# Patient Record
Sex: FEMALE | Race: WHITE | NOT HISPANIC OR LATINO | Employment: PART TIME | URBAN - METROPOLITAN AREA
[De-identification: names, ages, dates, MRNs, and addresses within clinical notes are randomized per-mention and may not be internally consistent; named-entity substitution may affect disease eponyms.]

---

## 2017-01-02 ENCOUNTER — MYC MEDICAL ADVICE (OUTPATIENT)
Dept: INTERNAL MEDICINE | Facility: CLINIC | Age: 57
End: 2017-01-02

## 2017-01-02 DIAGNOSIS — F41.9 ANXIETY: Primary | ICD-10-CM

## 2017-01-02 RX ORDER — DIAZEPAM 10 MG
10 TABLET ORAL EVERY 12 HOURS PRN
Qty: 30 TABLET | Refills: 0 | Status: CANCELLED | OUTPATIENT
Start: 2017-01-02

## 2017-01-02 NOTE — TELEPHONE ENCOUNTER
RN sees Rx faxed to [facility] on [address], not [address].    Will need new script faxed to pended [facility].    Christine Barron RN  [facility]

## 2017-01-04 ENCOUNTER — TELEPHONE (OUTPATIENT)
Dept: INTERNAL MEDICINE | Facility: CLINIC | Age: 57
End: 2017-01-04

## 2017-01-04 ENCOUNTER — MYC MEDICAL ADVICE (OUTPATIENT)
Dept: INTERNAL MEDICINE | Facility: CLINIC | Age: 57
End: 2017-01-04

## 2017-01-04 DIAGNOSIS — F41.9 ANXIETY: Primary | ICD-10-CM

## 2017-01-04 NOTE — TELEPHONE ENCOUNTER
diazepam (VALIUM) 10 MG tablet      Last Written Prescription Date:  12/26/16  Last Fill Quantity: 30,   # refills: 0  Last Office Visit with [facility] prescribing provider: 12/26/16  Future Office visit:    Next 5 appointments (look out 90 days)     Jan 19, 2017  5:20 PM   [facility]    [address] [place] [address] [id number] [phone]                   Routing refill request to provider for review/approval because:  Drug not on the [facility] refill protocol or controlled substance

## 2017-01-04 NOTE — TELEPHONE ENCOUNTER
Routed to  to address, I think the patient already came in for this.  See previous MyChart communication regarding Rx faxed (valium).  Rosmery Marquez RN  [facility]

## 2017-01-11 ENCOUNTER — MYC MEDICAL ADVICE (OUTPATIENT)
Dept: INTERNAL MEDICINE | Facility: CLINIC | Age: 57
End: 2017-01-11

## 2017-01-12 ENCOUNTER — MYC MEDICAL ADVICE (OUTPATIENT)
Dept: INTERNAL MEDICINE | Facility: CLINIC | Age: 57
End: 2017-01-12

## 2017-01-12 DIAGNOSIS — F90.9 ADULT ADHD: Primary | ICD-10-CM

## 2017-01-12 RX ORDER — DEXTROAMPHETAMINE SACCHARATE, AMPHETAMINE ASPARTATE MONOHYDRATE, DEXTROAMPHETAMINE SULFATE AND AMPHETAMINE SULFATE 2.5; 2.5; 2.5; 2.5 MG/1; MG/1; MG/1; MG/1
10 CAPSULE, EXTENDED RELEASE ORAL DAILY
Qty: 30 CAPSULE | Refills: 0 | Status: SHIPPED | OUTPATIENT
Start: 2017-01-12 | End: 2017-03-06

## 2017-01-12 RX ORDER — DIAZEPAM 10 MG
10 TABLET ORAL EVERY 12 HOURS PRN
Qty: 30 TABLET | Refills: 0 | Status: SHIPPED | OUTPATIENT
Start: 2017-01-12 | End: 2017-06-07

## 2017-01-12 RX ORDER — DEXTROAMPHETAMINE SACCHARATE, AMPHETAMINE ASPARTATE MONOHYDRATE, DEXTROAMPHETAMINE SULFATE AND AMPHETAMINE SULFATE 1.25; 1.25; 1.25; 1.25 MG/1; MG/1; MG/1; MG/1
5 CAPSULE, EXTENDED RELEASE ORAL DAILY
Qty: 30 CAPSULE | Refills: 0 | Status: SHIPPED | OUTPATIENT
Start: 2017-01-12 | End: 2017-01-19 | Stop reason: DRUGHIGH

## 2017-01-12 NOTE — TELEPHONE ENCOUNTER
Routing refill request to provider for review/approval because:  Drug not on the [facility] refill protocol   Please see message below.  Carmen Webb RN [facility] Triage.    Carmen Webb RN [facility] Triage.

## 2017-01-19 ENCOUNTER — OFFICE VISIT (OUTPATIENT)
Dept: INTERNAL MEDICINE | Facility: CLINIC | Age: 57
End: 2017-01-19
Payer: COMMERCIAL

## 2017-01-19 VITALS
OXYGEN SATURATION: 97 % | HEART RATE: 66 BPM | DIASTOLIC BLOOD PRESSURE: 81 MMHG | WEIGHT: 151 LBS | HEIGHT: 65 IN | BODY MASS INDEX: 25.16 KG/M2 | SYSTOLIC BLOOD PRESSURE: 131 MMHG | TEMPERATURE: 97.7 F

## 2017-01-19 DIAGNOSIS — J44.9 COPD, MODERATE (H): Primary | Chronic | ICD-10-CM

## 2017-01-19 DIAGNOSIS — F80.0 IMPAIRED SPEECH ARTICULATION: ICD-10-CM

## 2017-01-19 PROCEDURE — 99214 OFFICE O/P EST MOD 30 MIN: CPT | Performed by: INTERNAL MEDICINE

## 2017-01-19 NOTE — NURSING NOTE
"Chief Complaint   Patient presents with     RECHECK     breathing      Health Maintenance     Flu Shot       Initial /81 mmHg  Pulse 66  Temp(Src) 97.7  F (36.5  C) (Oral)  Ht 5' 4.5\" (1.638 m)  Wt 151 lb (68.493 kg)  BMI 25.53 kg/m2  SpO2 97% Estimated body mass index is 25.53 kg/(m^2) as calculated from the following:    Height as of this encounter: 5' 4.5\" (1.638 m).    Weight as of this encounter: 151 lb (68.493 kg).  BP completed using cuff size: regular  Lisa Perdue ma      "

## 2017-01-19 NOTE — MR AVS SNAPSHOT
After Visit Summary   1/19/2017    Georgette Pereira    MRN: [id number]           Patient Information     Date Of Birth          1960        Visit Information        Provider Department      1/19/2017 5:20 PM Yue Bermudez MD [facility]        Today's Diagnoses     COPD, moderate (H)    -  1     Impaired speech articulation           Care Instructions    Speech therapy:  Call ,  [phone]  For eval and treat    Lung Power Referral at [facility]   [phone]    Reduce adderall to 10 mg daily.  Okay to skip days when not working.     Return to clinic 3 months or sooner if needed.     Follow up with Dr Martinez        Follow-ups after your visit        Additional Services     PULMONARY REHAB REFERRAL       [facility] lung power            SPEECH THERAPY REFERRAL       *This therapy referral will be filtered to a centralized scheduling office at [facility] and the patient will receive a call to schedule an appointment at a [place] location most convenient for them. *     [facility] provides Speech Therapy evaluation and treatment and many specialty services across the [place] system.  If requesting a specialty program, please choose from the list below.  If you have not heard from the scheduling office within 2 business days, please call [phone] for all locations, with the exception of [place], please call [phone].       Treatment: Evaluation & Treatment  Speech Treatment Diagnosis: impaired articulation  Special Instructions: patient feels her speech has changed and hard to articulate self.  Wants therapy for this.    Special Programs: Voice   And per speech therapist.     Please be aware that coverage of these services is subject to the terms and limitations of your health insurance plan.  Call member services at your health plan with any benefit or coverage questions.      **Note to Provider:  If you are  "referring outside of [place] for the therapy appointment, please list the name of the location in the  special instructions  above, print the referral and give to the patient to schedule the appointment.                  Future tests that were ordered for you today     Open Future Orders        Priority Expected Expires Ordered    PULMONARY REHAB REFERRAL Routine  1/19/2018 1/19/2017            Who to contact     If you have questions or need follow up information about today's clinic visit or your schedule please contact [facility] directly at [phone].  Normal or non-critical lab and imaging results will be communicated to you by [employer], letter or phone within 4 business days after the clinic has received the results. If you do not hear from us within 7 days, please contact the clinic through [employer] or phone. If you have a critical or abnormal lab result, we will notify you by phone as soon as possible.  Submit refill requests through [employer] or call your pharmacy and they will forward the refill request to us. Please allow 3 business days for your refill to be completed.          Additional Information About Your Visit        [employer] Information     [employer] gives you secure access to your electronic health record. If you see a primary care provider, you can also send messages to your care team and make appointments. If you have questions, please call your primary care clinic.  If you do not have a primary care provider, please call [phone] and they will assist you.        Care EveryWhere ID     This is your Care EveryWhere ID. This could be used by other organizations to access your [place] medical records  [id number]        Your Vitals Were     Pulse Temperature Height BMI (Body Mass Index) Pulse Oximetry       66 97.7  F (36.5  C) (Oral) 5' 4.5\" (1.638 m) 25.53 kg/m2 97%        Blood Pressure from Last 3 Encounters:   01/19/17 131/81   12/26/16 139/93   12/08/16 154/104    " Weight from Last 3 Encounters:   01/19/17 151 lb (68.493 kg)   12/26/16 151 lb (68.493 kg)   12/08/16 150 lb (68.04 kg)              We Performed the Following     SPEECH THERAPY REFERRAL          Today's Medication Changes          These changes are accurate as of: 1/19/17  6:12 PM.  If you have any questions, ask your nurse or doctor.               These medicines have changed or have updated prescriptions.        Dose/Directions    amphetamine-dextroamphetamine 10 MG per 24 hr capsule   Commonly known as:  ADDERALL XR   This may have changed:  Another medication with the same name was removed. Continue taking this medication, and follow the directions you see here.   Used for:  Adult ADHD   Changed by:  Yue Bermudez MD        Dose:  10 mg   Take 1 capsule (10 mg) by mouth daily (with 5 mg for a total of 15 mg daily)    May fill on or after 1/12/17   Quantity:  30 capsule   Refills:  0                Primary Care Provider Office Phone # Fax #    Yue Bermudez [phone]       [facility] [address]  [facility] [address]        Thank you!     Thank you for choosing [facility]  for your care. Our goal is always to provide you with excellent care. Hearing back from our patients is one way we can continue to improve our services. Please take a few minutes to complete the written survey that you may receive in the mail after your visit with us. Thank you!             Your Updated Medication List - Protect others around you: Learn how to safely use, store and throw away your medicines at www.disposemymeds.org.          This list is accurate as of: 1/19/17  6:13 PM.  Always use your most recent med list.                   Brand Name Dispense Instructions for use    acetaminophen-codeine 300-30 MG per tablet    TYLENOL/codeine #3    120 tablet    Take 1-2 tablets by mouth every 4 hours as needed for pain       * albuterol 108 (90 BASE)  MCG/ACT Inhaler    albuterol    1 Inhaler    Inhale 2 puffs into the lungs every 6 hours as needed for shortness of breath / dyspnea or wheezing       * albuterol (2.5 MG/3ML) 0.083% neb solution     60 vial    Take 1 vial (2.5 mg) by nebulization every 6 hours as needed for shortness of breath / dyspnea or wheezing       amitriptyline 50 MG tablet    ELAVIL    90 tablet    Take 1 tablet (50 mg) by mouth At Bedtime       amphetamine-dextroamphetamine 10 MG per 24 hr capsule    ADDERALL XR    30 capsule    Take 1 capsule (10 mg) by mouth daily (with 5 mg for a total of 15 mg daily)    May fill on or after 1/12/17       atenolol 50 MG tablet    TENORMIN    90 tablet    Take 2 tablets (100 mg) by mouth daily       atorvastatin 40 MG tablet    LIPITOR    90 tablet    Take 1 tablet (40 mg) by mouth daily       diazepam 10 MG tablet    VALIUM    30 tablet    Take 1 tablet (10 mg) by mouth every 12 hours as needed for anxiety or sleep (limit one per day)       DULoxetine 60 MG EC capsule    CYMBALTA    90 capsule    Take 1 capsule (60 mg) by mouth daily       estradiol 0.1 MG/GM cream    ESTRACE VAGINAL    40 g    Place 2 g vaginally twice a week. Okay to substitute with preferred equivalent       ibuprofen 200 MG tablet    ADVIL/MOTRIN    100 tablet    Take 4 tablets by mouth every 4 hours as needed for pain.       ketoconazole 2 % cream    NIZORAL    30 g    Apply to the itching rash sparingly bid       lidocaine 2 % topical gel    XYLOCAINE    30 mL    Apply topically 2 times daily as needed for moderate pain       losartan 25 MG tablet    COZAAR    90 tablet    Take 1 tablet (25 mg) by mouth daily       mometasone-formoterol 200-5 MCG/ACT oral inhaler    DULERA    3 Inhaler    Inhale 2 puffs into the lungs 2 times daily       Sodium Fluoride 1.1 % Crea    SF 5000 PLUS    57 g    Use for oral hygiene twice daily       tiotropium 18 MCG capsule    SPIRIVA    90 capsule    Inhale 1 capsule (18 mcg) into the lungs daily        triamcinolone 0.1 % cream    KENALOG    30 g    Apply sparingly to affected area. As needed for itching       * Notice:  This list has 2 medication(s) that are the same as other medications prescribed for you. Read the directions carefully, and ask your doctor or other care provider to review them with you.

## 2017-01-19 NOTE — PROGRESS NOTES
SUBJECTIVE:                                                    Georgette Pereira is a 56 year old female who presents to clinic today for the following health issues:      COPD Follow-Up    Symptoms are currently: stable    Current fatigue or dyspnea with ambulation: same    Shortness of breath: same    Increased or change in Cough/Sputum: No    Fever(s): No    Baseline ambulation without stopping to rest  . Able to walk up  flights of stairs without stopping to rest.    Any ER/UC or hospital admissions since your last visit? No     History   Smoking status     Former Smoker -- 1.00 packs/day for 32 years     Types: Cigarettes     Quit date: 12/31/2012   Smokeless tobacco     Never Used     Comment: using nicotine replacement: e cigarattes     No results found for this basename: FEV1, [id number]       Amount of exercise or physical activity: None    Problems taking medications regularly: No    Medication side effects: none    Diet: regular (no restrictions)         Problem list and histories reviewed & adjusted, as indicated.  Additional history: as documented    Patient Active Problem List   Diagnosis     Lung nodule     Anxiety     Fibromyalgia     Knee pain     History of pneumonia, recurrent     Internal hemorrhoids with other complication     COPD, moderate (H)     Adult ADHD     Family history of early CAD     Hyperlipidemia LDL goal <130     Essential hypertension with goal blood pressure less than 140/90     Primary osteoarthritis involving multiple joints     Advanced directives, counseling/discussion     Past Surgical History   Procedure Laterality Date     Hysterectomy vaginal       C tmj arthroscopy/surgery  2000       Social History   Substance Use Topics     Smoking status: Former Smoker -- 1.00 packs/day for 32 years     Types: Cigarettes     Quit date: 12/31/2012     Smokeless tobacco: Never Used      Comment: using nicotine replacement: e cigarattes     Alcohol Use: No     Family History   Problem  Relation Age of Onset     Breast Cancer Maternal Grandmother      CEREBROVASCULAR DISEASE Mother      Hypertension Mother      HEART DISEASE Mother      CANCER Mother      Lung     Substance Abuse Mother      CEREBROVASCULAR DISEASE Father      Hypertension Father      CANCER Father      Metastatic, primary lung/Prostate     Substance Abuse Father      Aneurysm Paternal Uncle      Brain Aneurysms     CANCER Other      Substance Abuse Brother      Depression Son      Schizophrenia Brother      Bipolar Disorder Brother      Substance Abuse Brother          Current Outpatient Prescriptions   Medication Sig Dispense Refill     diazepam (VALIUM) 10 MG tablet Take 1 tablet (10 mg) by mouth every 12 hours as needed for anxiety or sleep (limit one per day) 30 tablet 0     amphetamine-dextroamphetamine (ADDERALL XR) 10 MG per 24 hr capsule Take 1 capsule (10 mg) by mouth daily (with 5 mg for a total of 15 mg daily)    May fill on or after 1/12/17 30 capsule 0     mometasone-formoterol (DULERA) 200-5 MCG/ACT oral inhaler Inhale 2 puffs into the lungs 2 times daily 3 Inhaler 3     tiotropium (SPIRIVA) 18 MCG capsule Inhale 1 capsule (18 mcg) into the lungs daily 90 capsule 3     atenolol (TENORMIN) 50 MG tablet Take 2 tablets (100 mg) by mouth daily 90 tablet 3     acetaminophen-codeine (TYLENOL/CODEINE #3) 300-30 MG per tablet Take 1-2 tablets by mouth every 4 hours as needed for pain 120 tablet 0     amitriptyline (ELAVIL) 50 MG tablet Take 1 tablet (50 mg) by mouth At Bedtime 90 tablet 0     Sodium Fluoride (SF 5000 PLUS) 1.1 % CREA Use for oral hygiene twice daily 57 g 12     atorvastatin (LIPITOR) 40 MG tablet Take 1 tablet (40 mg) by mouth daily 90 tablet 3     losartan (COZAAR) 25 MG tablet Take 1 tablet (25 mg) by mouth daily 90 tablet 3     lidocaine (XYLOCAINE) 2 % jelly Apply topically 2 times daily as needed for moderate pain 30 mL 3     DULoxetine (CYMBALTA) 60 MG capsule Take 1 capsule (60 mg) by mouth daily 90  "capsule 2     albuterol (2.5 MG/3ML) 0.083% nebulizer solution Take 1 vial (2.5 mg) by nebulization every 6 hours as needed for shortness of breath / dyspnea or wheezing 60 vial 6     albuterol (ALBUTEROL) 108 (90 BASE) MCG/ACT inhaler Inhale 2 puffs into the lungs every 6 hours as needed for shortness of breath / dyspnea or wheezing 1 Inhaler 5     triamcinolone (KENALOG) 0.1 % cream Apply sparingly to affected area. As needed for itching 30 g 0     ketoconazole (NIZORAL) 2 % cream Apply to the itching rash sparingly bid 30 g 1     estradiol (ESTRACE VAGINAL) 0.1 MG/GM vaginal cream Place 2 g vaginally twice a week. Okay to substitute with preferred equivalent 40 g 3     ibuprofen (ADVIL,MOTRIN) 200 MG tablet Take 4 tablets by mouth every 4 hours as needed for pain. 100 tablet 3     Allergies   Allergen Reactions     Buspirone Nausea     Lisinopril Cough     Vicodin [Hydrocodone-Acetaminophen] Nausea and Vomiting     Recent Labs   Lab Test  08/12/16   0851  03/02/16   1217  08/13/15   1034   LDL  55  51  197*   HDL  50  56  62   TRIG  202*  198*  278*   ALT   --   19   --    CR  0.89  0.91   --    GFRESTIMATED  66  64   --    GFRESTBLACK  80  77   --    POTASSIUM  3.7  4.6   --    TSH   --   0.58  1.84      BP Readings from Last 3 Encounters:   01/19/17 131/81   12/26/16 139/93   12/08/16 154/104    Wt Readings from Last 3 Encounters:   01/19/17 151 lb (68.493 kg)   12/26/16 151 lb (68.493 kg)   12/08/16 150 lb (68.04 kg)                  Labs reviewed in EPIC  Problem list, Medication list, Allergies, and Medical/Social/Surgical histories reviewed in [facility] and updated as appropriate.    ROS:  Constitutional, HEENT, cardiovascular, pulmonary, gi and gu systems are negative, except as otherwise noted.    OBJECTIVE:                                                    /81 mmHg  Pulse 66  Temp(Src) 97.7  F (36.5  C) (Oral)  Ht 5' 4.5\" (1.638 m)  Wt 151 lb (68.493 kg)  BMI 25.53 kg/m2  SpO2 97%  Body mass " index is 25.53 kg/(m^2).  GENERAL: healthy, alert and no distress  EYES: Eyes grossly normal to inspection, PERRL and conjunctivae and sclerae normal  NECK: no adenopathy, no asymmetry, masses, or scars and thyroid normal to palpation  RESP: lungs clear to auscultation - no rales, rhonchi or wheezes  RESP: diminished BS bilaterally  CV: regular rate and rhythm, normal S1 S2, no S3 or S4, no murmur, click or rub, no peripheral edema and peripheral pulses strong  MS: no gross musculoskeletal defects noted, no edema  NEURO: Normal strength and tone, mentation intact and speech normal  PSYCH: mentation appears normal, affect normal/bright  PSYCH:  Pressured speech.     Diagnostic Test Results:  Results for orders placed or performed in visit on 12/23/16   CT Chest w Contrast    Narrative    CT CHEST WITH CONTRAST   12/23/2016 10:55 AM    HISTORY: Shortness of breath.    TECHNIQUE: 80 mL Isovue-370 were injected intravenously. After  contrast injection, volumetric helical acquisition was performed from  the thoracic inlet through the upper abdomen. Coronal images were also  reconstructed. Radiation dose for this scan was reduced using  automated exposure control, adjustment of the mA and/or kV according  to patient size, or iterative reconstruction technique.    COMPARISON: Chest CT without IV contrast performed 8/23/2013.    FINDINGS: Emphysematous changes are noted in both upper lungs.  Calcified granuloma in the right lower lobe is unchanged. The lungs  are otherwise clear. No pleural or pericardial effusions. Calcified  mediastinal and right hilar lymph nodes are unchanged. No enlarged  lymph nodes are identified in the chest. Mild atherosclerotic  calcification of the thoracic aorta. Limited views of the upper  abdomen are unremarkable.      Impression    IMPRESSION:   1. No acute abnormality in the chest. No definite cause for shortness  of breath is identified.   2. Emphysematous changes are noted in both upper  lungs.   3. Evidence for old granulomatous disease in the right lower lobe of  the lung as well as within mediastinal and right hilar lymph nodes.    JAMAR CANALES MD        ASSESSMENT/PLAN:                                                            ICD-10-CM    1. COPD, moderate (H) J44.9 PULMONARY REHAB REFERRAL   2. Impaired speech articulation F80.0 SPEECH THERAPY REFERRAL       Lung power. Referral in.    Should follow up with pulmonary  Speech therapy for her pressured speech.  Patient request.  Patient agreeable to reducing the Adderall from 15 mg daily to 10 mg daily          RETURN TO CLINIC 3 months    Yue Bermudez MD  [facility]

## 2017-01-20 NOTE — PATIENT INSTRUCTIONS
Speech therapy:  Call ,  [phone]  For eval and treat    Lung Power Referral at [facility]   [phone]    Reduce adderall to 10 mg daily.  Okay to skip days when not working.     Return to clinic 3 months or sooner if needed.     Follow up with Dr Martinez

## 2017-01-30 DIAGNOSIS — M79.7 FIBROMYALGIA: Primary | ICD-10-CM

## 2017-01-30 NOTE — TELEPHONE ENCOUNTER
amitriptyline (ELAVIL) 50 MG tablet      Last Written Prescription Date: 9/23/16  Last Quantity: 90, # refills: 0  Last Office Visit with [facility], [facility] or [facility] prescribing provider: 1/19/17       CREATININE   Date Value Ref Range Status   08/12/2016 0.89 0.52 - 1.04 mg/dL Final     AST       16   3/2/2016  ALT       19   3/2/2016  BP Readings from Last 3 Encounters:   01/19/17 131/81   12/26/16 139/93   12/08/16 154/104

## 2017-01-31 ENCOUNTER — HOSPITAL ENCOUNTER (OUTPATIENT)
Dept: SPEECH THERAPY | Facility: CLINIC | Age: 57
Setting detail: THERAPIES SERIES
End: 2017-01-31
Attending: INTERNAL MEDICINE
Payer: COMMERCIAL

## 2017-01-31 PROCEDURE — 40000211 ZZHC STATISTIC SLP  DEPARTMENT VISIT: Performed by: SPEECH-LANGUAGE PATHOLOGIST

## 2017-01-31 PROCEDURE — 92523 SPEECH SOUND LANG COMPREHEN: CPT | Mod: GN | Performed by: SPEECH-LANGUAGE PATHOLOGIST

## 2017-01-31 RX ORDER — AMITRIPTYLINE HYDROCHLORIDE 50 MG/1
50 TABLET ORAL AT BEDTIME
Qty: 90 TABLET | Refills: 1 | Status: SHIPPED | OUTPATIENT
Start: 2017-01-31 | End: 2017-05-16

## 2017-01-31 NOTE — TELEPHONE ENCOUNTER
Prescription approved per [facility] to the [facility] Refill Protocol.     Shayna Almaraz RN   January 31, 2017 2:27 PM  [facility] Triage   [phone]

## 2017-02-03 ENCOUNTER — MYC MEDICAL ADVICE (OUTPATIENT)
Dept: INTERNAL MEDICINE | Facility: CLINIC | Age: 57
End: 2017-02-03

## 2017-02-06 NOTE — TELEPHONE ENCOUNTER
Routed another [employer] message to patient to clarify her need.  Rosmery Marquez RN  [facility]

## 2017-02-07 NOTE — TELEPHONE ENCOUNTER
Attempted to call patient at home number, left message on answering service requesting call back to clinic to discuss.  Rosmery Marquez RN  [facility]

## 2017-02-15 NOTE — PROGRESS NOTES
"Adult Outpatient Speech and Language Evaluation   01/31/17 1200       Present No   General Information   Type of Evaluation Speech and Language   Type Of Visit Initial   Start Of Care Date 01/31/17   Referring Physician Yue Bermudez MD   Orders Evaluate And Treat   Orders Comment Impaired articulation.   Medical Diagnosis No known associated medical diagnosis at this time.   Onset Of Illness/injury Or Date Of Surgery 01/19/17  (Order date. Pt reports speech has changed over the last year)   Precautions/Limitations no known precautions/limitations   Hearing WFL   Surgical/Medical history reviewed Yes   Pertinent History Of Current Problem Geena presents with reports of changes in her speech. She reports she has always been a fast talker, but over the last year her speech has gotten faster and more difficult to understand. She is able to slow her speech, but this requires significant attention and causes her to lose her thoughts/message. Geena does have diagnosed ADHD and Anxiety. Geena has taken medication for her ADHD, but has been attempting to decrease and quit this medication. When she stops her medication for 3-4 days she sees no change in her speech. Geena is not on any medications for her anxiety and reports she feels it is well managed and is not the cause of her speech changes. Geena's family and friends note that Geena has always talked fast, but have noted changes. Her boyfriend frequently tells her he can't understand her. In addition to her speech difficulty, Geena reports trouble coming up with words and that she \"loses her thoughts\" sometimes.   Patient Role/employment History Employed  (Works as a .)   Living environment Apartment/condo  (With her fiance.)   Patient/family Goals Geena reports she would like to be able to speak easily and clearly and to be understood.   FALL RISK SCREEN   Have you fallen 2 or more times in the last year? No   Have you fallen and had an injury in " the past year? No   Is the patient a fall risk? No   Speech   Deficits in Speech Respiration None   Deficits in Phonation None   Sustained vowel production 14  (3 trials completed with 10, 16 and 16 seconds.)   Deficits in Articulation Other (Comment)  (Increased speed of speech causing decreased intelligibility.)   Underlying oral motor deficit None known at this time.   Deficits in Resonance None   Deficits in Prosody None   Speech Comments Geena was 95% intelligible to the clinician throughout interview and self report. She does speak very quickly which at times causes her sounds and words to become run together. Geena participated in diadochokinetic rates with the following results: pa = 46, ta = 44 and ka = 41. pataka was completed 13 times in 7 seconds with mild discoordination noted when theses sounds and placements were combined.   Language: Verbal Expression (use of spoken language to express information)   Tests were administered at the following levels Complex (vocation/community/social activities);Moderate (routine daily activities)   Cambridge Naming Test, start at 30 (out of 60 total) 56  (Moderate increased processing time required for 12/60.)   Generative Naming Score; Cognitive Linguistic Quick Test 8   Generative Naming; Cognitive Linguistic Quick Test Result WNL  (Avg is 6.57)   Conversation; Cambridge Diagnostic Aphasia Exam rating (out of 5 total) 4.5  (Periods of increased processing time noted.)   Functional Assessment Scale (Verbal Expression) Mild Impairment   Comments (Verbal Expression) Geena presents with mild impairments to her verbal expression. She shows mild word-finding difficulty, requiring increased processing time. It is noted that her generative naming scores were within normal range, however, patient report indicates her performance to be low for her previous abilities.   Pragmatics (the social or functional use of a language)   Deficits noted in Nonverbal None   Education Assessment    Barriers to Learning No barriers   General Therapy Interventions   Planned Therapy Interventions Language;Communication   Language Verbal expression   Communication Improve speech intelligibility   Clinical Impression, SLP Eval   Criteria for Skilled Therapeutic Interventions Met yes;treatment indicated   SLP Diagnosis Mild dysarthria and expressive speech deficits.   Influenced by the following factors/impairments Unknown cause of changes to speech and language over the past year+.   Functional limitations due to impairments Decreased intelligibility with familiar and unfamiliar listeners.   Rehab potential affected by Unknown cause and atypical pattern of increased rate of speech.   Therapy Frequency 1 time;per week   Predicted Duration of Therapy Intervention (days/wks) 4 week trial of therapy at which time progress will be assessed.   Risks and Benefits of Treatment have been explained. Yes   Patient, Family & other staff in agreement with plan of care Yes   Clinical Impression Comments Geena presents with mild changes to her speech and expressive language. The cause of these changes is unknown at this time. Outpatient therapy will be trialed to determine if speech therapy will improve Geena's functional communication.   Cognitive/Communication Goals   Cognitive/Communication Goals 1;2   Cognitive/Communication Goal 1   Goal Identifier speech   Goal Description Geena will sustain a decrease in her rate of speech by 20% with no more than mild external cues for improved speech intelligibility.   Target Date 04/30/17   Cognitive/Communication Goal 2   Goal Identifier functional speech   Goal Description Geena will report no more than 1 request for repetition in a 5 minute period in functional, daily communication.   Target Date 04/30/17   Language/Cognition Goals   Language/Cognition Goals 1   Language/Cognition Goal 1   Goal Identifier word-finding   Goal Description Geena will complete complex level word-finding tasks  with at least 90% accuracy and no more than minimal increased processing time.   Target Date 04/30/17   Total Session Time   Total Evaluation Time 55 minutes     Rosmery Mann MA, [facility]SLP  [facility]  [phone] (Phone)  [phone] (Fax)

## 2017-02-17 ENCOUNTER — MYC MEDICAL ADVICE (OUTPATIENT)
Dept: INTERNAL MEDICINE | Facility: CLINIC | Age: 57
End: 2017-02-17

## 2017-02-20 ENCOUNTER — HOSPITAL ENCOUNTER (OUTPATIENT)
Dept: SPEECH THERAPY | Facility: CLINIC | Age: 57
Setting detail: THERAPIES SERIES
End: 2017-02-20
Attending: INTERNAL MEDICINE
Payer: COMMERCIAL

## 2017-02-20 ENCOUNTER — OFFICE VISIT (OUTPATIENT)
Dept: INTERNAL MEDICINE | Facility: CLINIC | Age: 57
End: 2017-02-20
Payer: COMMERCIAL

## 2017-02-20 VITALS
TEMPERATURE: 97.9 F | HEART RATE: 77 BPM | BODY MASS INDEX: 25.18 KG/M2 | OXYGEN SATURATION: 97 % | WEIGHT: 149 LBS | DIASTOLIC BLOOD PRESSURE: 87 MMHG | SYSTOLIC BLOOD PRESSURE: 135 MMHG

## 2017-02-20 DIAGNOSIS — L91.8 SKIN TAG: ICD-10-CM

## 2017-02-20 DIAGNOSIS — R07.0 THROAT PAIN: Primary | ICD-10-CM

## 2017-02-20 LAB
DEPRECATED S PYO AG THROAT QL EIA: NORMAL
MICRO REPORT STATUS: NORMAL
SPECIMEN SOURCE: NORMAL

## 2017-02-20 PROCEDURE — 92507 TX SP LANG VOICE COMM INDIV: CPT | Mod: GN | Performed by: SPEECH-LANGUAGE PATHOLOGIST

## 2017-02-20 PROCEDURE — 11200 RMVL SKIN TAGS UP TO&INC 15: CPT | Performed by: INTERNAL MEDICINE

## 2017-02-20 PROCEDURE — 87880 STREP A ASSAY W/OPTIC: CPT | Performed by: INTERNAL MEDICINE

## 2017-02-20 PROCEDURE — 87081 CULTURE SCREEN ONLY: CPT | Performed by: INTERNAL MEDICINE

## 2017-02-20 PROCEDURE — 40000211 ZZHC STATISTIC SLP  DEPARTMENT VISIT: Performed by: SPEECH-LANGUAGE PATHOLOGIST

## 2017-02-20 PROCEDURE — 99213 OFFICE O/P EST LOW 20 MIN: CPT | Mod: 25 | Performed by: INTERNAL MEDICINE

## 2017-02-20 NOTE — DISCHARGE INSTRUCTIONS
2/20/17    Practice talking while over-enunciating sounds and words. Think about really moving your lips a lot.    First try this with basic, familiar words and lists such as counting 1-20, the alphabet, days of the week and months of the year. Next, try this with phrases for reading. Then, try thinking of short sentences of your own, that you make up.        Breath breaks    Winston breaks with a pen, then say the paragraph with pauses where you marked and and read material with exaggerated enunciation.

## 2017-02-20 NOTE — NURSING NOTE
"Chief Complaint   Patient presents with     Mole     under breasts     Health Maintenance       Initial /87 (BP Location: Right arm, Patient Position: Chair, Cuff Size: Adult Regular)  Pulse 77  Temp 97.9  F (36.6  C) (Oral)  Wt 149 lb (67.6 kg)  SpO2 97%  BMI 25.18 kg/m2 Estimated body mass index is 25.18 kg/(m^2) as calculated from the following:    Height as of 1/19/17: 5' 4.5\" (1.638 m).    Weight as of this encounter: 149 lb (67.6 kg).  Medication Reconciliation: complete   Lisa Perdue ma      "

## 2017-02-20 NOTE — TELEPHONE ENCOUNTER
"Patient is coming in at 2:20 today, routed to [facility] to advise if there will be time to do procedure at this visit \"burning off skin tags under breasts\".  Rosmery Marquez RN  [facility]      "

## 2017-02-20 NOTE — PROGRESS NOTES
SUBJECTIVE:                                                    Georgette Pereira is a 56 year old female who presents to clinic today for the following health issues:      ENT Symptoms             Symptoms: cc Present Absent Comment   Fever/Chills   x    Fatigue  x     Muscle Aches  x     Eye Irritation   x    Sneezing  x     Nasal Elmer/Drg  x     Sinus Pressure/Pain   x    Loss of smell   x    Dental pain   x    Sore Throat  x     Swollen Glands   x    Ear Pain/Fullness   x    Cough  x     Wheeze  x     Chest Pain   x    Shortness of breath  x     Rash   x    Other         Symptom duration:  3 days   Symptom severity:  moderate   Treatments tried:  Delsym   Contacts:         Check moles under breasts             Problem list and histories reviewed & adjusted, as indicated.  Additional history: as documented    Patient Active Problem List   Diagnosis     Lung nodule     Anxiety     Fibromyalgia     Knee pain     History of pneumonia, recurrent     Internal hemorrhoids with other complication     COPD, moderate (H)     Adult ADHD     Family history of early CAD     Hyperlipidemia LDL goal <130     Essential hypertension with goal blood pressure less than 140/90     Primary osteoarthritis involving multiple joints     Advanced directives, counseling/discussion     Past Surgical History   Procedure Laterality Date     Hysterectomy vaginal       C tmj arthroscopy/surgery  2000       Social History   Substance Use Topics     Smoking status: Former Smoker     Packs/day: 1.00     Years: 32.00     Types: Cigarettes     Quit date: 12/31/2012     Smokeless tobacco: Never Used      Comment: using nicotine replacement: e cigarattes     Alcohol use No     Family History   Problem Relation Age of Onset     Breast Cancer Maternal Grandmother      CEREBROVASCULAR DISEASE Mother      Hypertension Mother      HEART DISEASE Mother      CANCER Mother      Lung     Substance Abuse Mother      CEREBROVASCULAR DISEASE Father       Hypertension Father      CANCER Father      Metastatic, primary lung/Prostate     Substance Abuse Father      CANCER Other      Substance Abuse Brother      Depression Son      Schizophrenia Brother      Bipolar Disorder Brother      Substance Abuse Brother      Aneurysm Paternal Uncle      Brain Aneurysms         Current Outpatient Prescriptions   Medication Sig Dispense Refill     amitriptyline (ELAVIL) 50 MG tablet Take 1 tablet (50 mg) by mouth At Bedtime 90 tablet 1     diazepam (VALIUM) 10 MG tablet Take 1 tablet (10 mg) by mouth every 12 hours as needed for anxiety or sleep (limit one per day) 30 tablet 0     amphetamine-dextroamphetamine (ADDERALL XR) 10 MG per 24 hr capsule Take 1 capsule (10 mg) by mouth daily (with 5 mg for a total of 15 mg daily)    May fill on or after 1/12/17 30 capsule 0     atenolol (TENORMIN) 50 MG tablet Take 2 tablets (100 mg) by mouth daily 90 tablet 3     acetaminophen-codeine (TYLENOL/CODEINE #3) 300-30 MG per tablet Take 1-2 tablets by mouth every 4 hours as needed for pain 120 tablet 0     atorvastatin (LIPITOR) 40 MG tablet Take 1 tablet (40 mg) by mouth daily 90 tablet 3     losartan (COZAAR) 25 MG tablet Take 1 tablet (25 mg) by mouth daily 90 tablet 3     lidocaine (XYLOCAINE) 2 % jelly Apply topically 2 times daily as needed for moderate pain 30 mL 3     albuterol (2.5 MG/3ML) 0.083% nebulizer solution Take 1 vial (2.5 mg) by nebulization every 6 hours as needed for shortness of breath / dyspnea or wheezing 60 vial 6     albuterol (ALBUTEROL) 108 (90 BASE) MCG/ACT inhaler Inhale 2 puffs into the lungs every 6 hours as needed for shortness of breath / dyspnea or wheezing 1 Inhaler 5     ketoconazole (NIZORAL) 2 % cream Apply to the itching rash sparingly bid 30 g 1     ibuprofen (ADVIL,MOTRIN) 200 MG tablet Take 800 mg by mouth every 4 hours as needed for pain Reported on 2/20/2017 100 tablet 3     mometasone-formoterol (DULERA) 200-5 MCG/ACT oral inhaler Inhale 2 puffs  into the lungs 2 times daily 3 Inhaler 3     tiotropium (SPIRIVA) 18 MCG capsule Inhale 1 capsule (18 mcg) into the lungs daily 90 capsule 3     Sodium Fluoride (SF 5000 PLUS) 1.1 % CREA Use for oral hygiene twice daily 57 g 12     DULoxetine (CYMBALTA) 60 MG capsule Take 1 capsule (60 mg) by mouth daily (Patient not taking: Reported on 2/20/2017) 90 capsule 2     triamcinolone (KENALOG) 0.1 % cream Apply sparingly to affected area. As needed for itching 30 g 0     estradiol (ESTRACE VAGINAL) 0.1 MG/GM vaginal cream Place 2 g vaginally twice a week. Okay to substitute with preferred equivalent (Patient not taking: Reported on 2/20/2017) 40 g 3     Allergies   Allergen Reactions     Buspirone Nausea     Lisinopril Cough     Vicodin [Hydrocodone-Acetaminophen] Nausea and Vomiting     BP Readings from Last 3 Encounters:   02/20/17 135/87   01/19/17 131/81   12/26/16 (!) 139/93    Wt Readings from Last 3 Encounters:   02/20/17 149 lb (67.6 kg)   01/19/17 151 lb (68.5 kg)   12/26/16 151 lb (68.5 kg)                  Labs reviewed in EPIC  Problem list, Medication list, Allergies, and Medical/Social/Surgical histories reviewed in [facility] and updated as appropriate.    ROS:  Constitutional, HEENT, cardiovascular, pulmonary, gi and gu systems are negative, except as otherwise noted.    OBJECTIVE:                                                    /87 (BP Location: Right arm, Patient Position: Chair, Cuff Size: Adult Regular)  Pulse 77  Temp 97.9  F (36.6  C) (Oral)  Wt 149 lb (67.6 kg)  SpO2 97%  BMI 25.18 kg/m2  Body mass index is 25.18 kg/(m^2).  GENERAL: healthy, alert and no distress  HENT: ear canals and TM's normal, nose and mouth without ulcers or lesions  HENT: hoarse voice  NECK: no adenopathy, no asymmetry, masses, or scars and thyroid normal to palpation  RESP: lungs clear to auscultation - no rales, rhonchi or wheezes  CV: regular rate and rhythm, normal S1 S2, no S3 or S4, no murmur, click or rub, no  peripheral edema and peripheral pulses strong  MS: no gross musculoskeletal defects noted, no edema  SKIN: several skin tags under breasts.  Using curved scissors and pick ups, 6 tags from left side and 1 from right were removed.  Tolerated well.   NEURO: Normal strength and tone, mentation intact and speech normal    Diagnostic Test Results:        ASSESSMENT/PLAN:                                                          ICD-10-CM    1. Throat pain R07.0 Strep, Rapid Screen     Beta strep group A culture   2. Skin tag L91.8           Yue Bermudez MD  [facility]

## 2017-02-20 NOTE — IP AVS SNAPSHOT
MRN:[id number]                      After Visit Summary   2/20/2017    Georgette Pereira    MRN: [id number]           Visit Information        Provider Department      2/20/2017 12:00 PM Rosmery Mann, SLP [facility]        Your next 10 appointments already scheduled     Feb 20, 2017  2:20 PM CST   [facility]    [address] [facility] [id number] [phone]            Feb 27, 2017 12:00 PM CST   Treatment 45 with Rosmery Johnathan, SLP   [facility] [place] [facility] [place]    [address] [facility] [address] [phone]                Further instructions from your care team       2/20/17    Practice talking while over-enunciating sounds and words. Think about really moving your lips a lot.    First try this with basic, familiar words and lists such as counting 1-20, the alphabet, days of the week and months of the year. Next, try this with phrases for reading. Then, try thinking of short sentences of your own, that you make up.        Breath breaks    Winston breaks with a pen, then say the paragraph with pauses where you marked and and read material with exaggerated enunciation.    [employer] Information     [employer] gives you secure access to your electronic health record. If you see a primary care provider, you can also send messages to your care team and make appointments. If you have questions, please call your primary care clinic.  If you do not have a primary care provider, please call [phone] and they will assist you.        Care EveryWhere ID     This is your Care EveryWhere ID. This could be used by other organizations to access your [place] medical records  [id number]

## 2017-02-20 NOTE — MR AVS SNAPSHOT
After Visit Summary   2/20/2017    Georgette Pereira    MRN: [id number]           Patient Information     Date Of Birth          1960        Visit Information        Provider Department      2/20/2017 2:20 PM Yue Bermudez MD [facility]        Today's Diagnoses     Throat pain    -  1    Skin tag           Follow-ups after your visit        Your next 10 appointments already scheduled     Feb 27, 2017 12:00 PM CST   Treatment 45 with JAYESH Barillas   [facility] [place] [facility] ([facility])    [address] [facility] [address] [phone]              Who to contact     If you have questions or need follow up information about today's clinic visit or your schedule please contact [facility] directly at [phone].  Normal or non-critical lab and imaging results will be communicated to you by MyChart, letter or phone within 4 business days after the clinic has received the results. If you do not hear from us within 7 days, please contact the clinic through MyChart or phone. If you have a critical or abnormal lab result, we will notify you by phone as soon as possible.  Submit refill requests through [employer] or call your pharmacy and they will forward the refill request to us. Please allow 3 business days for your refill to be completed.          Additional Information About Your Visit        MyChart Information     [employer] gives you secure access to your electronic health record. If you see a primary care provider, you can also send messages to your care team and make appointments. If you have questions, please call your primary care clinic.  If you do not have a primary care provider, please call [phone] and they will assist you.        Care EveryWhere ID     This is your Care EveryWhere ID. This could be used by other organizations to access your [place] medical records  [id number]        Your Vitals Were      Pulse Temperature Pulse Oximetry BMI (Body Mass Index)          77 97.9  F (36.6  C) (Oral) 97% 25.18 kg/m2         Blood Pressure from Last 3 Encounters:   02/20/17 135/87   01/19/17 131/81   12/26/16 (!) 139/93    Weight from Last 3 Encounters:   02/20/17 149 lb (67.6 kg)   01/19/17 151 lb (68.5 kg)   12/26/16 151 lb (68.5 kg)              We Performed the Following     Strep, Rapid Screen        Primary Care Provider Office Phone # Fax #    Yue Bermudez [phone]       [facility] [address]  [facility] [address]        Thank you!     Thank you for choosing [facility]  for your care. Our goal is always to provide you with excellent care. Hearing back from our patients is one way we can continue to improve our services. Please take a few minutes to complete the written survey that you may receive in the mail after your visit with us. Thank you!             Your Updated Medication List - Protect others around you: Learn how to safely use, store and throw away your medicines at www.disposemymeds.org.          This list is accurate as of: 2/20/17  3:02 PM.  Always use your most recent med list.                   Brand Name Dispense Instructions for use    acetaminophen-codeine 300-30 MG per tablet    TYLENOL/codeine #3    120 tablet    Take 1-2 tablets by mouth every 4 hours as needed for pain       * albuterol 108 (90 BASE) MCG/ACT Inhaler    albuterol    1 Inhaler    Inhale 2 puffs into the lungs every 6 hours as needed for shortness of breath / dyspnea or wheezing       * albuterol (2.5 MG/3ML) 0.083% neb solution     60 vial    Take 1 vial (2.5 mg) by nebulization every 6 hours as needed for shortness of breath / dyspnea or wheezing       amitriptyline 50 MG tablet    ELAVIL    90 tablet    Take 1 tablet (50 mg) by mouth At Bedtime       amphetamine-dextroamphetamine 10 MG per 24 hr capsule    ADDERALL XR    30 capsule    Take 1  capsule (10 mg) by mouth daily (with 5 mg for a total of 15 mg daily)    May fill on or after 1/12/17       atenolol 50 MG tablet    TENORMIN    90 tablet    Take 2 tablets (100 mg) by mouth daily       atorvastatin 40 MG tablet    LIPITOR    90 tablet    Take 1 tablet (40 mg) by mouth daily       diazepam 10 MG tablet    VALIUM    30 tablet    Take 1 tablet (10 mg) by mouth every 12 hours as needed for anxiety or sleep (limit one per day)       DULoxetine 60 MG EC capsule    CYMBALTA    90 capsule    Take 1 capsule (60 mg) by mouth daily       estradiol 0.1 MG/GM cream    ESTRACE VAGINAL    40 g    Place 2 g vaginally twice a week. Okay to substitute with preferred equivalent       ibuprofen 200 MG tablet    ADVIL/MOTRIN    100 tablet    Take 800 mg by mouth every 4 hours as needed for pain Reported on 2/20/2017       ketoconazole 2 % cream    NIZORAL    30 g    Apply to the itching rash sparingly bid       lidocaine 2 % topical gel    XYLOCAINE    30 mL    Apply topically 2 times daily as needed for moderate pain       losartan 25 MG tablet    COZAAR    90 tablet    Take 1 tablet (25 mg) by mouth daily       mometasone-formoterol 200-5 MCG/ACT oral inhaler    DULERA    3 Inhaler    Inhale 2 puffs into the lungs 2 times daily       Sodium Fluoride 1.1 % Crea    SF 5000 PLUS    57 g    Use for oral hygiene twice daily       tiotropium 18 MCG capsule    SPIRIVA    90 capsule    Inhale 1 capsule (18 mcg) into the lungs daily       triamcinolone 0.1 % cream    KENALOG    30 g    Apply sparingly to affected area. As needed for itching       * Notice:  This list has 2 medication(s) that are the same as other medications prescribed for you. Read the directions carefully, and ask your doctor or other care provider to review them with you.

## 2017-02-22 LAB
BACTERIA SPEC CULT: NORMAL
MICRO REPORT STATUS: NORMAL
SPECIMEN SOURCE: NORMAL

## 2017-02-24 ENCOUNTER — MYC MEDICAL ADVICE (OUTPATIENT)
Dept: INTERNAL MEDICINE | Facility: CLINIC | Age: 57
End: 2017-02-24

## 2017-02-24 NOTE — TELEPHONE ENCOUNTER
Routed to provider to advise on message below.     Mishel Keith RN  [facility]

## 2017-02-27 ENCOUNTER — HOSPITAL ENCOUNTER (OUTPATIENT)
Dept: SPEECH THERAPY | Facility: CLINIC | Age: 57
Setting detail: THERAPIES SERIES
End: 2017-02-27
Attending: INTERNAL MEDICINE
Payer: COMMERCIAL

## 2017-02-27 PROCEDURE — 92507 TX SP LANG VOICE COMM INDIV: CPT | Mod: GN | Performed by: SPEECH-LANGUAGE PATHOLOGIST

## 2017-02-27 PROCEDURE — 40000211 ZZHC STATISTIC SLP  DEPARTMENT VISIT: Performed by: SPEECH-LANGUAGE PATHOLOGIST

## 2017-03-06 ENCOUNTER — MYC MEDICAL ADVICE (OUTPATIENT)
Dept: INTERNAL MEDICINE | Facility: CLINIC | Age: 57
End: 2017-03-06

## 2017-03-06 DIAGNOSIS — F90.9 ADULT ADHD: ICD-10-CM

## 2017-03-06 RX ORDER — DEXTROAMPHETAMINE SACCHARATE, AMPHETAMINE ASPARTATE MONOHYDRATE, DEXTROAMPHETAMINE SULFATE AND AMPHETAMINE SULFATE 2.5; 2.5; 2.5; 2.5 MG/1; MG/1; MG/1; MG/1
10 CAPSULE, EXTENDED RELEASE ORAL DAILY
Qty: 30 CAPSULE | Refills: 0 | Status: SHIPPED | OUTPATIENT
Start: 2017-03-06 | End: 2017-05-12

## 2017-03-06 NOTE — TELEPHONE ENCOUNTER
Controlled Substance Refill Request for Adderall 10mg    Last refill: 1/12/2017 - 30qty    Last clinic visit: 2/20/2017     Next appt: None with PCP    Controlled substance agreement on file: No.    Documentation in problem list reviewed:  Yes    Processing:  Patient will  in clinic       Christine Barron RN  [facility]

## 2017-03-06 NOTE — TELEPHONE ENCOUNTER
Routing refill request to provider for review/approval because:  Drug not on the [facility] refill protocol       Christine Barron RN  [facility]

## 2017-03-07 NOTE — TELEPHONE ENCOUNTER
Script delivered to [facility] . Patient notified via [employer] message that this is available for .

## 2017-03-07 NOTE — TELEPHONE ENCOUNTER
Team 1:  Can you send patient question re: current dose Adderall?  I printed 10 mg and we were hoping to wean down to 10 mg daily by now

## 2017-03-13 DIAGNOSIS — J44.9 COPD, MODERATE (H): ICD-10-CM

## 2017-03-13 RX ORDER — ALBUTEROL SULFATE 90 UG/1
2 AEROSOL, METERED RESPIRATORY (INHALATION) EVERY 6 HOURS PRN
Qty: 1 INHALER | Refills: 5 | Status: SHIPPED | OUTPATIENT
Start: 2017-03-13 | End: 2017-11-28

## 2017-03-13 NOTE — TELEPHONE ENCOUNTER
albuterol (ALBUTEROL) 108 (90 BASE) MCG/ACT inhaler       Last Written Prescription Date: 2/8/16  Last Fill Quantity: 1, # refills: 5    Last Office Visit with G, P or [facility] prescribing provider:  2/20/17   Future Office Visit:       Date of Last Asthma Action Plan Letter:   There are no preventive care reminders to display for this patient.   Asthma Control Test: No flowsheet data found.    Date of Last Spirometry Test:   No results found for this or any previous visit.

## 2017-03-13 NOTE — TELEPHONE ENCOUNTER
Prescription approved per [facility] Refill Protocol.     Shayna Almaraz RN   March 13, 2017 5:23 PM  [facility]   [phone]

## 2017-03-13 NOTE — TELEPHONE ENCOUNTER
Routed to provider as FYI. Patient states she does 10 mg daily.    Mishel Keith RN  [facility]

## 2017-03-16 ENCOUNTER — HOSPITAL ENCOUNTER (OUTPATIENT)
Dept: SPEECH THERAPY | Facility: CLINIC | Age: 57
Setting detail: THERAPIES SERIES
End: 2017-03-16
Attending: INTERNAL MEDICINE
Payer: COMMERCIAL

## 2017-03-16 PROCEDURE — 40000218 ZZH STATISTIC SLP PEDS DEPT VISIT: Performed by: SPEECH-LANGUAGE PATHOLOGIST

## 2017-03-16 PROCEDURE — 92507 TX SP LANG VOICE COMM INDIV: CPT | Mod: GN | Performed by: SPEECH-LANGUAGE PATHOLOGIST

## 2017-03-16 NOTE — DISCHARGE INSTRUCTIONS
Record yourself reading or during a conversation for 2-3 minutes, every other day. Use this to self-assess your speed and make sure it matches what it feels like to you.

## 2017-03-27 ENCOUNTER — MYC MEDICAL ADVICE (OUTPATIENT)
Dept: INTERNAL MEDICINE | Facility: CLINIC | Age: 57
End: 2017-03-27

## 2017-04-03 ENCOUNTER — MYC MEDICAL ADVICE (OUTPATIENT)
Dept: INTERNAL MEDICINE | Facility: CLINIC | Age: 57
End: 2017-04-03

## 2017-04-03 NOTE — TELEPHONE ENCOUNTER
Called and spoke with patient.  She is not available at that time.  Scheduled at first available between [facility] & patient, tomorrow at 11:40am.    [facility]    Christine Barron RN  [facility]

## 2017-04-03 NOTE — TELEPHONE ENCOUNTER
Please see MyChart message below.  Patient has been experiencing chapped, swollen lips that extend onto skin.  She is wondering if PCP can fit her in.    Routed to PCP.    Christine Barron RN  [facility]

## 2017-04-04 ENCOUNTER — OFFICE VISIT (OUTPATIENT)
Dept: INTERNAL MEDICINE | Facility: CLINIC | Age: 57
End: 2017-04-04
Payer: COMMERCIAL

## 2017-04-04 VITALS
DIASTOLIC BLOOD PRESSURE: 91 MMHG | WEIGHT: 148 LBS | HEART RATE: 68 BPM | TEMPERATURE: 97.8 F | SYSTOLIC BLOOD PRESSURE: 137 MMHG | BODY MASS INDEX: 25.01 KG/M2 | OXYGEN SATURATION: 98 %

## 2017-04-04 DIAGNOSIS — K13.0 CHEILITIS: Primary | ICD-10-CM

## 2017-04-04 DIAGNOSIS — M79.7 FIBROMYALGIA: Chronic | ICD-10-CM

## 2017-04-04 PROCEDURE — 99213 OFFICE O/P EST LOW 20 MIN: CPT | Performed by: INTERNAL MEDICINE

## 2017-04-04 RX ORDER — TRIAMCINOLONE ACETONIDE 1 MG/G
CREAM TOPICAL
Qty: 30 G | Refills: 0 | Status: SHIPPED | OUTPATIENT
Start: 2017-04-04 | End: 2017-05-16

## 2017-04-04 NOTE — PROGRESS NOTES
SUBJECTIVE:                                                    Georgette Pereira is a 56 year old female who presents to clinic today for the following health issues:      Chapped lips and very painful x 5 weeks .  Symptoms are waxing and waning.          Problem list and histories reviewed & adjusted, as indicated.  Additional history: as documented    Patient Active Problem List   Diagnosis     Lung nodule     Anxiety     Fibromyalgia     Knee pain     History of pneumonia, recurrent     Internal hemorrhoids with other complication     COPD, moderate (H)     Adult ADHD     Family history of early CAD     Hyperlipidemia LDL goal <130     Essential hypertension with goal blood pressure less than 140/90     Primary osteoarthritis involving multiple joints     Advanced directives, counseling/discussion     Past Surgical History:   Procedure Laterality Date     C TMJ ARTHROSCOPY/SURGERY  2000     HYSTERECTOMY VAGINAL         Social History   Substance Use Topics     Smoking status: Former Smoker     Packs/day: 1.00     Years: 32.00     Types: Cigarettes     Quit date: 12/31/2012     Smokeless tobacco: Never Used      Comment: using nicotine replacement: e cigarattes     Alcohol use No     Family History   Problem Relation Age of Onset     Breast Cancer Maternal Grandmother      CEREBROVASCULAR DISEASE Mother      Hypertension Mother      HEART DISEASE Mother      CANCER Mother      Lung     Substance Abuse Mother      CEREBROVASCULAR DISEASE Father      Hypertension Father      CANCER Father      Metastatic, primary lung/Prostate     Substance Abuse Father      CANCER Other      Substance Abuse Brother      Depression Son      Schizophrenia Brother      Bipolar Disorder Brother      Substance Abuse Brother      Aneurysm Paternal Uncle      Brain Aneurysms         Current Outpatient Prescriptions   Medication Sig Dispense Refill     triamcinolone (KENALOG) 0.1 % cream Apply sparingly to affected area three times  daily for 14 days. 30 g 0     albuterol (ALBUTEROL) 108 (90 BASE) MCG/ACT Inhaler Inhale 2 puffs into the lungs every 6 hours as needed for shortness of breath / dyspnea or wheezing 1 Inhaler 5     amphetamine-dextroamphetamine (ADDERALL XR) 10 MG per 24 hr capsule Take 1 capsule (10 mg) by mouth daily May fill on or after 3/12/17 30 capsule 0     diazepam (VALIUM) 10 MG tablet Take 1 tablet (10 mg) by mouth every 12 hours as needed for anxiety or sleep (limit one per day) 30 tablet 0     tiotropium (SPIRIVA) 18 MCG capsule Inhale 1 capsule (18 mcg) into the lungs daily 90 capsule 3     atenolol (TENORMIN) 50 MG tablet Take 2 tablets (100 mg) by mouth daily 90 tablet 3     acetaminophen-codeine (TYLENOL/CODEINE #3) 300-30 MG per tablet Take 1-2 tablets by mouth every 4 hours as needed for pain 120 tablet 0     Sodium Fluoride (SF 5000 PLUS) 1.1 % CREA Use for oral hygiene twice daily 57 g 12     lidocaine (XYLOCAINE) 2 % jelly Apply topically 2 times daily as needed for moderate pain 30 mL 3     DULoxetine (CYMBALTA) 60 MG capsule Take 1 capsule (60 mg) by mouth daily (Patient taking differently: Take 30 mg by mouth daily ) 90 capsule 2     albuterol (2.5 MG/3ML) 0.083% nebulizer solution Take 1 vial (2.5 mg) by nebulization every 6 hours as needed for shortness of breath / dyspnea or wheezing 60 vial 6     ibuprofen (ADVIL,MOTRIN) 200 MG tablet Take 800 mg by mouth every 4 hours as needed for pain Reported on 2/20/2017 100 tablet 3     amitriptyline (ELAVIL) 50 MG tablet Take 1 tablet (50 mg) by mouth At Bedtime (Patient not taking: Reported on 4/4/2017) 90 tablet 1     atorvastatin (LIPITOR) 40 MG tablet Take 1 tablet (40 mg) by mouth daily (Patient not taking: Reported on 4/4/2017) 90 tablet 3     triamcinolone (KENALOG) 0.1 % cream Apply sparingly to affected area. As needed for itching (Patient not taking: Reported on 4/4/2017) 30 g 0     estradiol (ESTRACE VAGINAL) 0.1 MG/GM vaginal cream Place 2 g vaginally  twice a week. Okay to substitute with preferred equivalent (Patient not taking: Reported on 2/20/2017) 40 g 3     Allergies   Allergen Reactions     Buspirone Nausea     Lisinopril Cough     Vicodin [Hydrocodone-Acetaminophen] Nausea and Vomiting     Recent Labs   Lab Test  08/12/16   0851  03/02/16   1217  08/13/15   1034   LDL  55  51  197*   HDL  50  56  62   TRIG  202*  198*  278*   ALT   --   19   --    CR  0.89  0.91   --    GFRESTIMATED  66  64   --    GFRESTBLACK  80  77   --    POTASSIUM  3.7  4.6   --    TSH   --   0.58  1.84      BP Readings from Last 3 Encounters:   04/04/17 (!) 137/91   02/20/17 135/87   01/19/17 131/81    Wt Readings from Last 3 Encounters:   04/04/17 148 lb (67.1 kg)   02/20/17 149 lb (67.6 kg)   01/19/17 151 lb (68.5 kg)                  Labs reviewed in EPIC    Reviewed and updated as needed this visit by clinical staff  Tobacco  Allergies  Med Hx  Surg Hx  Fam Hx  Soc Hx      Reviewed and updated as needed this visit by Provider         ROS:  Constitutional, HEENT, cardiovascular, pulmonary, gi and gu systems are negative, except as otherwise noted.    OBJECTIVE:                                                    BP (!) 137/91 (BP Location: Left arm, Patient Position: Chair, Cuff Size: Adult Regular)  Pulse 68  Temp 97.8  F (36.6  C) (Oral)  Wt 148 lb (67.1 kg)  SpO2 98%  BMI 25.01 kg/m2  Body mass index is 25.01 kg/(m^2).  GENERAL: healthy, alert and no distress  EYES: Eyes grossly normal to inspection, PERRL and conjunctivae and sclerae normal  HENT: normal cephalic/atraumatic and nose and mouth without ulcers or lesions  MS: no gross musculoskeletal defects noted, no edema.  Chapping/blistering of entire upper and lower lips, obscuring vermillion border.   PSYCH: mentation appears normal, affect normal/bright    Diagnostic Test Results:  none      ASSESSMENT/PLAN:                                                            ICD-10-CM    1. Cheilitis K13.0 triamcinolone  (KENALOG) 0.1 % cream     DERMATOLOGY REFERRAL   2. Fibromyalgia M79.7 MASSAGE THERAPY REFERRAL       Appears to be due to exposure due to the distribution of rash.  Patient unable to identify exposure.    Triamcinolone , see derm. Asked patient to leave small area untreated in case biopsy needed.   May need patch tests to identify allergens.     Yue Bermudez MD  [facility]

## 2017-04-04 NOTE — PATIENT INSTRUCTIONS
[facility] [phone]       Triamcinolone cream twice daily

## 2017-04-04 NOTE — MR AVS SNAPSHOT
After Visit Summary   4/4/2017    Georgette Pereira    MRN: [id number]           Patient Information     Date Of Birth          1960        Visit Information        Provider Department      4/4/2017 11:40 AM Yue Bermudez MD [facility]        Today's Diagnoses     Cheilitis    -  1    Fibromyalgia          Care Instructions     [facility]le Mcgregor [phone]       Triamcinolone cream twice daily            Follow-ups after your visit        Additional Services     DERMATOLOGY REFERRAL       Your provider has referred you to: [facility]: [facility]  [place] [phone]   [email or web address]    Please be aware that coverage of these services is subject to the terms and limitations of your health insurance plan.  Call member services at your health plan with any benefit or coverage questions.      Please bring the following with you to your appointment:    (1) Any X-Rays, CTs or MRIs which have been performed.  Contact the facility where they were done to arrange for  prior to your scheduled appointment.    (2) List of current medications  (3) This referral request   (4) Any documents/labs given to you for this referral            MASSAGE THERAPY REFERRAL       Massage therapy for fibromyalgia.  2 times weekly                  Your next 10 appointments already scheduled     Apr 13, 2017 12:15 PM CDT   Treatment 45 with Rosmery Johnathan, SLP   [facility] ([facility]    [address] [facility] [id number] [phone]            Apr 27, 2017 12:15 PM CDT   Treatment 45 with Rosmery Johnathan, SLP   [facility] ([facility]    [address] [facility] [address]   [phone]            May 11, 2017 12:15 PM CDT   Treatment 45 with Rosmery [place], SLP   [facility] ([facility])    [address] [facility] [address]    [phone]              Who to contact     If you have questions or need follow up information about today's clinic visit or your schedule please contact [facility] directly at [phone].  Normal or non-critical lab and imaging results will be communicated to you by MyChart, letter or phone within 4 business days after the clinic has received the results. If you do not hear from us within 7 days, please contact the clinic through MyChart or phone. If you have a critical or abnormal lab result, we will notify you by phone as soon as possible.  Submit refill requests through [employer] or call your pharmacy and they will forward the refill request to us. Please allow 3 business days for your refill to be completed.          Additional Information About Your Visit        [email or web address] Information     [employer] gives you secure access to your electronic health record. If you see a primary care provider, you can also send messages to your care team and make appointments. If you have questions, please call your primary care clinic.  If you do not have a primary care provider, please call [phone] and they will assist you.        Care EveryWhere ID     This is your Care EveryWhere ID. This could be used by other organizations to access your [place] medical records  [id number]        Your Vitals Were     Pulse Temperature Pulse Oximetry BMI (Body Mass Index)          68 97.8  F (36.6  C) (Oral) 98% 25.01 kg/m2         Blood Pressure from Last 3 Encounters:   04/04/17 (!) 137/91   02/20/17 135/87   01/19/17 131/81    Weight from Last 3 Encounters:   04/04/17 148 lb (67.1 kg)   02/20/17 149 lb (67.6 kg)   01/19/17 151 lb (68.5 kg)              We Performed the Following     DERMATOLOGY REFERRAL     MASSAGE THERAPY REFERRAL          Today's Medication Changes          These changes are accurate as of: 4/4/17 12:22 PM.  If you have any questions, ask your nurse or doctor.               These medicines  have changed or have updated prescriptions.        Dose/Directions    DULoxetine 60 MG EC capsule   Commonly known as:  CYMBALTA   This may have changed:  how much to take   Used for:  Anxiety, Fibromyalgia        Dose:  60 mg   Take 1 capsule (60 mg) by mouth daily   Quantity:  90 capsule   Refills:  2       * triamcinolone 0.1 % cream   Commonly known as:  KENALOG   This may have changed:  Another medication with the same name was added. Make sure you understand how and when to take each.   Used for:  Vagina itching   Changed by:  Yue Bermudez MD        Apply sparingly to affected area. As needed for itching   Quantity:  30 g   Refills:  0       * triamcinolone 0.1 % cream   Commonly known as:  KENALOG   This may have changed:  You were already taking a medication with the same name, and this prescription was added. Make sure you understand how and when to take each.   Used for:  Cheilitis   Changed by:  Yue Bermudez MD        Apply sparingly to affected area three times daily for 14 days.   Quantity:  30 g   Refills:  0       * Notice:  This list has 2 medication(s) that are the same as other medications prescribed for you. Read the directions carefully, and ask your doctor or other care provider to review them with you.         Where to get your medicines      [facility] [address] [place] [address] [facility]  [address] [facility] [address]    Hours:  24-hours Phone:  [phone]     triamcinolone 0.1 % cream                Primary Care Provider Office Phone # Fax #    Yue Bermudez [phone]       [facility] [address] [facility] [address]        Thank you!     Thank you for choosing [facility]  for your care. Our goal is always to provide you with excellent care. Hearing back from our patients is one way we can  continue to improve our services. Please take a few minutes to complete the written survey that you may receive in the mail after your visit with us. Thank you!             Your Updated Medication List - Protect others around you: Learn how to safely use, store and throw away your medicines at www.disposemymeds.org.          This list is accurate as of: 4/4/17 12:22 PM.  Always use your most recent med list.                   Brand Name Dispense Instructions for use    acetaminophen-codeine 300-30 MG per tablet    TYLENOL/codeine #3    120 tablet    Take 1-2 tablets by mouth every 4 hours as needed for pain       * albuterol (2.5 MG/3ML) 0.083% neb solution     60 vial    Take 1 vial (2.5 mg) by nebulization every 6 hours as needed for shortness of breath / dyspnea or wheezing       * albuterol 108 (90 BASE) MCG/ACT Inhaler    albuterol    1 Inhaler    Inhale 2 puffs into the lungs every 6 hours as needed for shortness of breath / dyspnea or wheezing       amitriptyline 50 MG tablet    ELAVIL    90 tablet    Take 1 tablet (50 mg) by mouth At Bedtime       amphetamine-dextroamphetamine 10 MG per 24 hr capsule    ADDERALL XR    30 capsule    Take 1 capsule (10 mg) by mouth daily May fill on or after 3/12/17       atenolol 50 MG tablet    TENORMIN    90 tablet    Take 2 tablets (100 mg) by mouth daily       atorvastatin 40 MG tablet    LIPITOR    90 tablet    Take 1 tablet (40 mg) by mouth daily       diazepam 10 MG tablet    VALIUM    30 tablet    Take 1 tablet (10 mg) by mouth every 12 hours as needed for anxiety or sleep (limit one per day)       DULoxetine 60 MG EC capsule    CYMBALTA    90 capsule    Take 1 capsule (60 mg) by mouth daily       estradiol 0.1 MG/GM cream    ESTRACE VAGINAL    40 g    Place 2 g vaginally twice a week. Okay to substitute with preferred equivalent       ibuprofen 200 MG tablet    ADVIL/MOTRIN    100 tablet    Take 800 mg by mouth every 4 hours as needed for pain Reported on 2/20/2017        lidocaine 2 % topical gel    XYLOCAINE    30 mL    Apply topically 2 times daily as needed for moderate pain       Sodium Fluoride 1.1 % Crea    SF 5000 PLUS    57 g    Use for oral hygiene twice daily       tiotropium 18 MCG capsule    SPIRIVA    90 capsule    Inhale 1 capsule (18 mcg) into the lungs daily       * triamcinolone 0.1 % cream    KENALOG    30 g    Apply sparingly to affected area. As needed for itching       * triamcinolone 0.1 % cream    KENALOG    30 g    Apply sparingly to affected area three times daily for 14 days.       * Notice:  This list has 4 medication(s) that are the same as other medications prescribed for you. Read the directions carefully, and ask your doctor or other care provider to review them with you.

## 2017-04-04 NOTE — NURSING NOTE
"Chief Complaint   Patient presents with     Patient Request     chapped lips        Initial BP (!) 137/91 (BP Location: Left arm, Patient Position: Chair, Cuff Size: Adult Regular)  Pulse 68  Temp 97.8  F (36.6  C) (Oral)  Wt 148 lb (67.1 kg)  SpO2 98%  BMI 25.01 kg/m2 Estimated body mass index is 25.01 kg/(m^2) as calculated from the following:    Height as of 1/19/17: 5' 4.5\" (1.638 m).    Weight as of this encounter: 148 lb (67.1 kg).  Medication Reconciliation: complete   Lisa Perdue ma      "

## 2017-04-10 DIAGNOSIS — M15.0 PRIMARY OSTEOARTHRITIS INVOLVING MULTIPLE JOINTS: ICD-10-CM

## 2017-04-10 NOTE — TELEPHONE ENCOUNTER
acetaminophen-codeine (TYLENOL/CODEINE #3) 300-30 MG per tablet      Last Written Prescription Date:  11-29-16  Last Fill Quantity: 120,   # refills: 0  Last Office Visit with [facility], [facility] or [facility] prescribing provider: 4-4-17  Future Office visit:       Routing refill request to provider for review/approval because:  Drug not on the [facility], [facility] or [facility] refill protocol or controlled substance

## 2017-05-11 ENCOUNTER — HOSPITAL ENCOUNTER (OUTPATIENT)
Dept: SPEECH THERAPY | Facility: CLINIC | Age: 57
Setting detail: THERAPIES SERIES
End: 2017-05-11
Attending: INTERNAL MEDICINE
Payer: COMMERCIAL

## 2017-05-11 PROCEDURE — 40000211 ZZHC STATISTIC SLP  DEPARTMENT VISIT: Performed by: SPEECH-LANGUAGE PATHOLOGIST

## 2017-05-11 PROCEDURE — 92507 TX SP LANG VOICE COMM INDIV: CPT | Mod: GN | Performed by: SPEECH-LANGUAGE PATHOLOGIST

## 2017-05-11 NOTE — PROGRESS NOTES
"Outpatient Speech Language Pathology Discharge Note     Patient: Georgette Pereira  : 1960    Beginning/End Dates of Reporting Period:  2017 to 2017    Referring Provider: Yue Bermudez MD    Therapy Diagnosis: Impaired articulation.    Client Self Report: Geena reports was sick at the time of her last session but is doing better. She also reports she feels her speech is \"much better\".    Goals:  Goal Identifier speech   Goal Description Geena will sustain a decrease in her rate of speech by 20% with no more than mild external cues for improved speech intelligibility.   Target Date 17   Date Met  17   Progress: Geena has shown good ability for slowed rate of speech of up to 40% slowed. Within conversation tasks, mild changes can be detected as Geena has varying levels of awareness. However, she does well with in-conversation adjustments as needed.     Goal Identifier functional speech   Goal Description Geena will report no more than 1 request for repetition in a 5 minute period in functional, daily communication.   Target Date 17   Date Met      Progress: Geena reports that for most of her interactions she does not receive requests for repetitions. However, with her boyfriend, he frequently asks her to repeat herself. 1-2 unintelligible words were noted in a 5 minute period, but did not require request for clarification as context was able to be used.     Goal Identifier word-finding   Goal Description Geena will complete complex level word-finding tasks with at least 90% accuracy and no more than minimal increased processing time.   Target Date 17   Date Met  17   Progress: Geena reports she is completing word-finding tasks with good accuracy and feels she is increasing her speed in these tasks as well.     Plan:  Discharge from therapy.    Discharge:    Reason for Discharge: Patient has met all goals. Geena has shown excellent participation in increasing her self-awareness and " self-monitoring of her speech. She is also changing her rate and loudness within conversations as needed. Given the unknown cause of Geena's speech changes, Geena's management of her speech was the primary goal of therapy and has been met.    Discharge Plan: Patient to continue home program as desired.    Rosmery Mann MA, [facility]  [facility]  [phone] (Phone)  [phone] (Fax)

## 2017-05-12 ENCOUNTER — MYC MEDICAL ADVICE (OUTPATIENT)
Dept: INTERNAL MEDICINE | Facility: CLINIC | Age: 57
End: 2017-05-12

## 2017-05-12 DIAGNOSIS — F90.9 ADULT ADHD: ICD-10-CM

## 2017-05-12 RX ORDER — DEXTROAMPHETAMINE SACCHARATE, AMPHETAMINE ASPARTATE MONOHYDRATE, DEXTROAMPHETAMINE SULFATE AND AMPHETAMINE SULFATE 2.5; 2.5; 2.5; 2.5 MG/1; MG/1; MG/1; MG/1
10 CAPSULE, EXTENDED RELEASE ORAL DAILY
Qty: 30 CAPSULE | Refills: 0 | Status: SHIPPED | OUTPATIENT
Start: 2017-05-12 | End: 2017-06-23

## 2017-05-12 NOTE — TELEPHONE ENCOUNTER
Controlled Substance Refill Request for Adderall 10mg    Last refill: 3/6/17 - 30qty    Last clinic visit: 4/4/17     Next appt: 5/16/17    Controlled substance agreement on file: No.    Documentation in problem list reviewed:  Yes    Processing:  Patient will  in clinic         Christine Barron RN  [facility]

## 2017-05-16 ENCOUNTER — OFFICE VISIT (OUTPATIENT)
Dept: INTERNAL MEDICINE | Facility: CLINIC | Age: 57
End: 2017-05-16
Payer: COMMERCIAL

## 2017-05-16 VITALS
DIASTOLIC BLOOD PRESSURE: 76 MMHG | SYSTOLIC BLOOD PRESSURE: 122 MMHG | BODY MASS INDEX: 24.5 KG/M2 | OXYGEN SATURATION: 98 % | HEART RATE: 63 BPM | TEMPERATURE: 97.3 F | WEIGHT: 145 LBS

## 2017-05-16 DIAGNOSIS — Z82.49 FAMILY HISTORY OF EARLY CAD: ICD-10-CM

## 2017-05-16 DIAGNOSIS — E78.5 HYPERLIPIDEMIA LDL GOAL <130: ICD-10-CM

## 2017-05-16 DIAGNOSIS — J44.9 COPD, MODERATE (H): Chronic | ICD-10-CM

## 2017-05-16 DIAGNOSIS — M79.7 FIBROMYALGIA: ICD-10-CM

## 2017-05-16 DIAGNOSIS — I10 ESSENTIAL HYPERTENSION WITH GOAL BLOOD PRESSURE LESS THAN 140/90: Primary | Chronic | ICD-10-CM

## 2017-05-16 PROCEDURE — 99214 OFFICE O/P EST MOD 30 MIN: CPT | Performed by: INTERNAL MEDICINE

## 2017-05-16 RX ORDER — ATORVASTATIN CALCIUM 20 MG/1
20 TABLET, FILM COATED ORAL DAILY
Qty: 90 TABLET | Refills: 3 | Status: SHIPPED | OUTPATIENT
Start: 2017-05-16 | End: 2017-11-28

## 2017-05-16 RX ORDER — LOSARTAN POTASSIUM 25 MG/1
50 TABLET ORAL DAILY
Qty: 90 TABLET | Refills: 3
Start: 2017-05-16 | End: 2017-11-30 | Stop reason: DRUGHIGH

## 2017-05-16 NOTE — MR AVS SNAPSHOT
After Visit Summary   5/16/2017    Georgette Pereira    MRN: [id number]           Patient Information     Date Of Birth          1960        Visit Information        Provider Department      5/16/2017 8:40 AM Yue Bermudez MD [facility]        Today's Diagnoses     Essential hypertension with goal blood pressure less than 140/90    -  1    COPD, moderate (H)        Fibromyalgia        Family history of early CAD        Hyperlipidemia LDL goal <130          Care Instructions    Blood pressure Diary    Resume Cymbalta (60) and Lipitor (20) daily    Continue current management with losartan,atenolol.. for now    Return to clinic one month with Blood pressure diary:  Review meds.   Goal BP is < 140/90    [facility]: [facility] (Adults & Pediatrics) - [place] [phone]   Pulmonary        Follow-ups after your visit        Additional Services     MASSAGE THERAPY REFERRAL       Massage therapy once weekly for Fibromyalgia            PULMONARY MEDICINE REFERRAL       Your provider has referred you to: [facility]: [facility] (Adults & Pediatrics) - [place] [phone]   [email or web address]  Dr Martinez    Please be aware that coverage of these services is subject to the terms and limitations of your health insurance plan.  Call member services at your health plan with any benefit or coverage questions.      Please bring the following with you to your appointment:    (1) Any X-Rays, CTs or MRIs which have been performed.  Contact the facility where they were done to arrange for  prior to your scheduled appointment.    (2) List of current medications   (3) This referral request   (4) Any documents/labs given to you for this referral                  Who to contact     If you have questions or need follow up information about today's clinic visit or your schedule please contact  [facility] directly at [phone].  Normal or non-critical lab and imaging results will be communicated to you by MyChart, letter or phone within 4 business days after the clinic has received the results. If you do not hear from us within 7 days, please contact the clinic through [employer] or phone. If you have a critical or abnormal lab result, we will notify you by phone as soon as possible.  Submit refill requests through [employer] or call your pharmacy and they will forward the refill request to us. Please allow 3 business days for your refill to be completed.          Additional Information About Your Visit        [employer] Information     [employer] gives you secure access to your electronic health record. If you see a primary care provider, you can also send messages to your care team and make appointments. If you have questions, please call your primary care clinic.  If you do not have a primary care provider, please call [phone] and they will assist you.        Care EveryWhere ID     This is your Care EveryWhere ID. This could be used by other organizations to access your [facility] medical records  [id number]        Your Vitals Were     Pulse Temperature Pulse Oximetry BMI (Body Mass Index)          63 97.3  F (36.3  C) (Oral) 98% 24.5 kg/m2         Blood Pressure from Last 3 Encounters:   05/16/17 122/76   04/04/17 (!) 137/91   02/20/17 135/87    Weight from Last 3 Encounters:   05/16/17 145 lb (65.8 kg)   04/04/17 148 lb (67.1 kg)   02/20/17 149 lb (67.6 kg)              We Performed the Following     MASSAGE THERAPY REFERRAL     PULMONARY MEDICINE REFERRAL          Today's Medication Changes          These changes are accurate as of: 5/16/17  9:37 AM.  If you have any questions, ask your nurse or doctor.               Start taking these medicines.        Dose/Directions    atorvastatin 20 MG tablet   Commonly known as:  LIPITOR   Used for:  Hyperlipidemia LDL goal <130   Started by:   Yue Bermudez MD        Dose:  20 mg   Take 1 tablet (20 mg) by mouth daily   Quantity:  90 tablet   Refills:  3       losartan 25 MG tablet   Commonly known as:  COZAAR   Used for:  Essential hypertension with goal blood pressure less than 140/90   Started by:  Yue Bermudez MD        Dose:  50 mg   Take 2 tablets (50 mg) by mouth daily   Quantity:  90 tablet   Refills:  3            Where to get your medicines      These medications were sent to [facility] [place] [facility] [address] [address]  [address], [place] [address]    Hours:  24-hours Phone:  [phone]     atorvastatin 20 MG tablet         Some of these will need a paper prescription and others can be bought over the counter.  Ask your nurse if you have questions.     You don't need a prescription for these medications     losartan 25 MG tablet                Primary Care Provider Office Phone # Fax #    Yue Bermudez [phone]       [address] [facility] [address]        Thank you!     Thank you for choosing [facility]  for your care. Our goal is always to provide you with excellent care. Hearing back from our patients is one way we can continue to improve our services. Please take a few minutes to complete the written survey that you may receive in the mail after your visit with us. Thank you!             Your Updated Medication List - Protect others around you: Learn how to safely use, store and throw away your medicines at www.disposemymeds.org.          This list is accurate as of: 5/16/17  9:37 AM.  Always use your most recent med list.                   Brand Name Dispense Instructions for use    acetaminophen-codeine 300-30 MG per tablet    TYLENOL #3    120 tablet    TAKE 1 TO 2 TABLETS BY MOUTH EVERY 4 HOURS AS NEEDED FOR PAIN       * albuterol (2.5 MG/3ML) 0.083% neb  solution     60 vial    Take 1 vial (2.5 mg) by nebulization every 6 hours as needed for shortness of breath / dyspnea or wheezing       * albuterol 108 (90 BASE) MCG/ACT Inhaler    albuterol    1 Inhaler    Inhale 2 puffs into the lungs every 6 hours as needed for shortness of breath / dyspnea or wheezing       amphetamine-dextroamphetamine 10 MG per 24 hr capsule    ADDERALL XR    30 capsule    Take 1 capsule (10 mg) by mouth daily May fill on or after 5/12/17       atenolol 50 MG tablet    TENORMIN    90 tablet    Take 2 tablets (100 mg) by mouth daily       atorvastatin 20 MG tablet    LIPITOR    90 tablet    Take 1 tablet (20 mg) by mouth daily       diazepam 10 MG tablet    VALIUM    30 tablet    Take 1 tablet (10 mg) by mouth every 12 hours as needed for anxiety or sleep (limit one per day)       DULoxetine 60 MG EC capsule    CYMBALTA    90 capsule    Take 1 capsule (60 mg) by mouth daily       estradiol 0.1 MG/GM cream    ESTRACE VAGINAL    40 g    Place 2 g vaginally twice a week. Okay to substitute with preferred equivalent       ibuprofen 200 MG tablet    ADVIL/MOTRIN    100 tablet    Take 800 mg by mouth every 4 hours as needed for pain Reported on 2/20/2017       lidocaine 2 % topical gel    XYLOCAINE    30 mL    Apply topically 2 times daily as needed for moderate pain       losartan 25 MG tablet    COZAAR    90 tablet    Take 2 tablets (50 mg) by mouth daily       Sodium Fluoride 1.1 % Crea    SF 5000 PLUS    57 g    Use for oral hygiene twice daily       tiotropium 18 MCG capsule    SPIRIVA    90 capsule    Inhale 1 capsule (18 mcg) into the lungs daily       triamcinolone 0.1 % cream    KENALOG    30 g    Apply sparingly to affected area. As needed for itching       * Notice:  This list has 2 medication(s) that are the same as other medications prescribed for you. Read the directions carefully, and ask your doctor or other care provider to review them with you.

## 2017-05-16 NOTE — NURSING NOTE
"Chief Complaint   Patient presents with     Derm Problem     spot on lip      Health Maintenance       Initial /76 (BP Location: Right arm, Patient Position: Chair, Cuff Size: Adult Regular)  Pulse 63  Temp 97.3  F (36.3  C) (Oral)  Wt 145 lb (65.8 kg)  SpO2 98%  BMI 24.5 kg/m2 Estimated body mass index is 24.5 kg/(m^2) as calculated from the following:    Height as of 1/19/17: 5' 4.5\" (1.638 m).    Weight as of this encounter: 145 lb (65.8 kg).  Medication Reconciliation: complete   Lisa Perdue ma      "

## 2017-05-16 NOTE — PROGRESS NOTES
SUBJECTIVE:                                                    Georgette Pereira is a 56 year old female who presents to clinic today for the following health issues:      Spot on lip for 2 weeks (better now).  Derm appointment is next week.  The symptoms wax and wane.       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  Has a difficult relationship with sister who is now caregiver with her mom...    Patient self increased losartan from 25 to 50 mg, due to elevated BP at 200 systolic... .  BP now 150 - 160 systolic.      Had started cymbalta and then forgot to continue... Is restarting.      Patient aches and pains did not improve with holding Lipitor.      Patient sleeping well without amitryptilline.       Patient has not followed up with Dr Martinez for pulmonary    She wants massage therapy order for FMS.          Problem list and histories reviewed & adjusted, as indicated.  Additional history: as documented    Patient Active Problem List   Diagnosis     Lung nodule     Anxiety     Fibromyalgia     Knee pain     History of pneumonia, recurrent     Internal hemorrhoids with other complication     COPD, moderate (H)     Adult ADHD     Family history of early CAD     Hyperlipidemia LDL goal <130     Essential hypertension with goal blood pressure less than 140/90     Primary osteoarthritis involving multiple joints     Advanced directives, counseling/discussion     Past Surgical History:   Procedure Laterality Date     C TMJ ARTHROSCOPY/SURGERY  2000     HYSTERECTOMY VAGINAL         Social History   Substance Use Topics     Smoking status: Former Smoker     Packs/day: 1.00     Years: 32.00     Types: Cigarettes, Other     Quit date: 12/31/2012     Smokeless tobacco: Never Used      Comment: using nicotine replacement: e cigarattes     Alcohol use No     Family History   Problem Relation Age of Onset     Breast Cancer Maternal Grandmother      CEREBROVASCULAR DISEASE Mother      Hypertension  Mother      HEART DISEASE Mother      CANCER Mother      Lung     Substance Abuse Mother      CEREBROVASCULAR DISEASE Father      Hypertension Father      CANCER Father      Metastatic, primary lung/Prostate     Substance Abuse Father      CANCER Other      Substance Abuse Brother      Depression Son      Schizophrenia Brother      Bipolar Disorder Brother      Substance Abuse Brother      Aneurysm Paternal Uncle      Brain Aneurysms         Current Outpatient Prescriptions   Medication Sig Dispense Refill     losartan (COZAAR) 25 MG tablet Take 2 tablets (50 mg) by mouth daily 90 tablet 3     atorvastatin (LIPITOR) 20 MG tablet Take 1 tablet (20 mg) by mouth daily 90 tablet 3     amphetamine-dextroamphetamine (ADDERALL XR) 10 MG per 24 hr capsule Take 1 capsule (10 mg) by mouth daily May fill on or after 5/12/17 30 capsule 0     acetaminophen-codeine (TYLENOL #3) 300-30 MG per tablet TAKE 1 TO 2 TABLETS BY MOUTH EVERY 4 HOURS AS NEEDED FOR PAIN 120 tablet 0     albuterol (ALBUTEROL) 108 (90 BASE) MCG/ACT Inhaler Inhale 2 puffs into the lungs every 6 hours as needed for shortness of breath / dyspnea or wheezing 1 Inhaler 5     diazepam (VALIUM) 10 MG tablet Take 1 tablet (10 mg) by mouth every 12 hours as needed for anxiety or sleep (limit one per day) 30 tablet 0     tiotropium (SPIRIVA) 18 MCG capsule Inhale 1 capsule (18 mcg) into the lungs daily 90 capsule 3     atenolol (TENORMIN) 50 MG tablet Take 2 tablets (100 mg) by mouth daily 90 tablet 3     Sodium Fluoride (SF 5000 PLUS) 1.1 % CREA Use for oral hygiene twice daily 57 g 12     lidocaine (XYLOCAINE) 2 % jelly Apply topically 2 times daily as needed for moderate pain 30 mL 3     albuterol (2.5 MG/3ML) 0.083% nebulizer solution Take 1 vial (2.5 mg) by nebulization every 6 hours as needed for shortness of breath / dyspnea or wheezing 60 vial 6     triamcinolone (KENALOG) 0.1 % cream Apply sparingly to affected area. As needed for itching 30 g 0     ibuprofen  (ADVIL,MOTRIN) 200 MG tablet Take 800 mg by mouth every 4 hours as needed for pain Reported on 2/20/2017 100 tablet 3     [DISCONTINUED] triamcinolone (KENALOG) 0.1 % cream Apply sparingly to affected area three times daily for 14 days. 30 g 0     [DISCONTINUED] atorvastatin (LIPITOR) 40 MG tablet Take 1 tablet (40 mg) by mouth daily (Patient not taking: Reported on 4/4/2017) 90 tablet 3     DULoxetine (CYMBALTA) 60 MG capsule Take 1 capsule (60 mg) by mouth daily (Patient not taking: Reported on 5/16/2017) 90 capsule 2     estradiol (ESTRACE VAGINAL) 0.1 MG/GM vaginal cream Place 2 g vaginally twice a week. Okay to substitute with preferred equivalent (Patient not taking: Reported on 5/16/2017) 40 g 3     Allergies   Allergen Reactions     Buspirone Nausea     Lisinopril Cough     Vicodin [Hydrocodone-Acetaminophen] Nausea and Vomiting     Recent Labs   Lab Test  08/12/16   0851  03/02/16   1217  08/13/15   1034   LDL  55  51  197*   HDL  50  56  62   TRIG  202*  198*  278*   ALT   --   19   --    CR  0.89  0.91   --    GFRESTIMATED  66  64   --    GFRESTBLACK  80  77   --    POTASSIUM  3.7  4.6   --    TSH   --   0.58  1.84      BP Readings from Last 3 Encounters:   05/16/17 122/76   04/04/17 (!) 137/91   02/20/17 135/87    Wt Readings from Last 3 Encounters:   05/16/17 145 lb (65.8 kg)   04/04/17 148 lb (67.1 kg)   02/20/17 149 lb (67.6 kg)                  Labs reviewed in EPIC    Reviewed and updated as needed this visit by clinical staff  Tobacco  Allergies  Med Hx  Surg Hx  Fam Hx  Soc Hx      Reviewed and updated as needed this visit by Provider         ROS:  Constitutional, HEENT, cardiovascular, pulmonary (SOB), gi and gu systems are negative, except as otherwise noted.    OBJECTIVE:                                                    /76 (BP Location: Right arm, Patient Position: Chair, Cuff Size: Adult Regular)  Pulse 63  Temp 97.3  F (36.3  C) (Oral)  Wt 145 lb (65.8 kg)  SpO2 98%  BMI  24.5 kg/m2  Body mass index is 24.5 kg/(m^2).  GENERAL: healthy, alert and no distress  EYES: Eyes grossly normal to inspection, PERRL and conjunctivae and sclerae normal  NECK: no adenopathy, no asymmetry, masses, or scars and thyroid normal to palpation  RESP: lungs clear to auscultation - no rales, rhonchi or wheezes  MS: no gross musculoskeletal defects noted, no edema  PSYCH: mentation appears normal, affect normal/bright. Speech a little pressured    Diagnostic Test Results:  Results for orders placed or performed in visit on 02/20/17   Strep, Rapid Screen   Result Value Ref Range    Specimen Description Throat     Rapid Strep A Screen       NEGATIVE: No Group A streptococcal antigen detected by immunoassay, await   culture report.      Micro Report Status FINAL 02/20/2017    Beta strep group A culture   Result Value Ref Range    Specimen Description Throat     Culture Micro No Beta Streptococcus isolated     Micro Report Status FINAL 02/22/2017         ASSESSMENT/PLAN:                                                          ICD-10-CM    1. Essential hypertension with goal blood pressure less than 140/90 I10 losartan (COZAAR) 25 MG tablet   2. COPD, moderate (H) J44.9 PULMONARY MEDICINE REFERRAL   3. Fibromyalgia M79.7 MASSAGE THERAPY REFERRAL   4. Family history of early CAD Z82.49    5. Hyperlipidemia LDL goal <130 E78.5 atorvastatin (LIPITOR) 20 MG tablet     Will resume lipitor :  Holding this did not affect FMS.   Watchful waiting on BP for now, may need to be more aggressive is BP elevation persists (fm hx AAA)  Resume cymbalta for depression/pain.   Due to revisit pulmonary      Patient Instructions   Blood pressure Diary    Resume Cymbalta (60) and Lipitor (20) daily    Continue current management with losartan,atenolol.. for now    Return to clinic one month with Blood pressure diary:  Review meds.   Goal BP is < 140/90    [facility]: [facility] (Adults & Pediatrics) - [place]  [phone]   Pulmonary      Yue Bermudez MD  [facility]

## 2017-05-16 NOTE — PATIENT INSTRUCTIONS
Blood pressure Diary    Resume Cymbalta (60) and Lipitor (20) daily    Continue current management with losartan,atenolol.. for now    Return to clinic one month with Blood pressure diary:  Review meds.   Goal BP is < 140/90    [facility]: [facility] (Adults & Pediatrics) - [place] [phone]   Pulmonary

## 2017-05-26 ENCOUNTER — TRANSFERRED RECORDS (OUTPATIENT)
Dept: HEALTH INFORMATION MANAGEMENT | Facility: CLINIC | Age: 57
End: 2017-05-26

## 2017-06-07 DIAGNOSIS — F41.9 ANXIETY: ICD-10-CM

## 2017-06-07 NOTE — TELEPHONE ENCOUNTER
diazepam (VALIUM) 10 MG tablet      Last Written Prescription Date:  1-12-17  Last Fill Quantity: 30,   # refills: 0  Last [facility] Visit with [facility] or [facility] prescribing provider: 5-16-17  Future Office visit:       Routing refill request to provider for review/approval because:  Drug not on the [facility], [facility] or [facility] refill protocol or controlled substance

## 2017-06-08 ENCOUNTER — MYC MEDICAL ADVICE (OUTPATIENT)
Dept: FAMILY MEDICINE | Facility: CLINIC | Age: 57
End: 2017-06-08

## 2017-06-08 RX ORDER — DIAZEPAM 10 MG
TABLET ORAL
Qty: 30 TABLET | Refills: 0 | Status: SHIPPED | OUTPATIENT
Start: 2017-06-08 | End: 2017-08-29

## 2017-06-23 ENCOUNTER — MYC MEDICAL ADVICE (OUTPATIENT)
Dept: FAMILY MEDICINE | Facility: CLINIC | Age: 57
End: 2017-06-23

## 2017-06-23 DIAGNOSIS — F90.9 ADULT ADHD: ICD-10-CM

## 2017-06-23 RX ORDER — DEXTROAMPHETAMINE SACCHARATE, AMPHETAMINE ASPARTATE MONOHYDRATE, DEXTROAMPHETAMINE SULFATE AND AMPHETAMINE SULFATE 2.5; 2.5; 2.5; 2.5 MG/1; MG/1; MG/1; MG/1
10 CAPSULE, EXTENDED RELEASE ORAL DAILY
Qty: 30 CAPSULE | Refills: 0 | Status: SHIPPED | OUTPATIENT
Start: 2017-06-23 | End: 2017-07-31

## 2017-06-23 NOTE — TELEPHONE ENCOUNTER
Controlled Substance Refill Request for Adderall 10mg XR    Last refill: 5/12/17 - 30    Last clinic visit: 5/16/17     Next appt: None with PCP    Controlled substance agreement on file: No.    Documentation in problem list reviewed:  Yes    Processing:  Patient will  in clinic       Christine Barron RN  [facility]

## 2017-06-23 NOTE — TELEPHONE ENCOUNTER
Routing refill request to provider for review/approval because:  Drug not on the [facility] refill protocol       Christine Barron RN  [facility]

## 2017-06-26 DIAGNOSIS — J44.9 COPD, MODERATE (H): ICD-10-CM

## 2017-06-26 DIAGNOSIS — M79.7 FIBROMYALGIA: Chronic | ICD-10-CM

## 2017-06-26 NOTE — TELEPHONE ENCOUNTER
lidocaine (XYLOCAINE) 2 % jelly      Last Written Prescription Date: 7-12-16  Last Fill Quantity: 30ml, # refills: 3  Last Office Visit with [facility], [facility] or [facility] prescribing provider: 5-16-17       Creatinine   Date Value Ref Range Status   08/12/2016 0.89 0.52 - 1.04 mg/dL Final     albuterol (2.5 MG/3ML) 0.083% nebulizer solution       Last Written Prescription Date: 2-17-16  Last Fill Quantity: 60, # refills: 6    Last Office Visit with [facility], [facility] or [facility] prescribing provider:  5-16-17   Future Office Visit:       Date of Last Asthma Action Plan Letter:   There are no preventive care reminders to display for this patient.   Asthma Control Test: No flowsheet data found.    Date of Last Spirometry Test:   No results found for this or any previous visit.

## 2017-06-27 RX ORDER — ALBUTEROL SULFATE 0.83 MG/ML
1 SOLUTION RESPIRATORY (INHALATION) EVERY 6 HOURS PRN
Qty: 60 VIAL | Refills: 6 | Status: SHIPPED | OUTPATIENT
Start: 2017-06-27 | End: 2021-06-01

## 2017-06-27 NOTE — TELEPHONE ENCOUNTER
Routing refill request to provider for review/approval because:  Unable to fill lidocaine jelly.  Carmen Webb RN [facility] Triage.

## 2017-07-29 ENCOUNTER — MYC MEDICAL ADVICE (OUTPATIENT)
Dept: FAMILY MEDICINE | Facility: CLINIC | Age: 57
End: 2017-07-29

## 2017-07-29 DIAGNOSIS — F41.9 ANXIETY: ICD-10-CM

## 2017-07-29 DIAGNOSIS — I10 ESSENTIAL HYPERTENSION WITH GOAL BLOOD PRESSURE LESS THAN 140/90: Chronic | ICD-10-CM

## 2017-07-29 DIAGNOSIS — F90.9 ADULT ADHD: ICD-10-CM

## 2017-07-29 DIAGNOSIS — M79.7 FIBROMYALGIA: ICD-10-CM

## 2017-07-31 RX ORDER — DEXTROAMPHETAMINE SACCHARATE, AMPHETAMINE ASPARTATE MONOHYDRATE, DEXTROAMPHETAMINE SULFATE AND AMPHETAMINE SULFATE 2.5; 2.5; 2.5; 2.5 MG/1; MG/1; MG/1; MG/1
10 CAPSULE, EXTENDED RELEASE ORAL DAILY
Qty: 30 CAPSULE | Refills: 0 | Status: SHIPPED | OUTPATIENT
Start: 2017-07-31 | End: 2017-09-08

## 2017-07-31 RX ORDER — DULOXETIN HYDROCHLORIDE 60 MG/1
CAPSULE, DELAYED RELEASE ORAL
Qty: 90 CAPSULE | Refills: 3 | Status: SHIPPED | OUTPATIENT
Start: 2017-07-31 | End: 2018-08-20

## 2017-07-31 RX ORDER — LOSARTAN POTASSIUM 25 MG/1
TABLET ORAL
Qty: 90 TABLET | Refills: 3 | Status: SHIPPED | OUTPATIENT
Start: 2017-07-31 | End: 2017-11-28

## 2017-07-31 NOTE — TELEPHONE ENCOUNTER
Routing refill request to provider for review/approval because:  Drug not on the [facility] refill protocol arun Singh RN  [facility]

## 2017-08-07 DIAGNOSIS — M15.0 PRIMARY OSTEOARTHRITIS INVOLVING MULTIPLE JOINTS: ICD-10-CM

## 2017-08-08 NOTE — TELEPHONE ENCOUNTER
acetaminophen-codeine (TYLENOL #3) 300-30 MG per tablet      Last Written Prescription Date:  4/11/17  Last Fill Quantity: 120,   # refills: 0  Last Office Visit with [facility], [facility] or [facility] prescribing provider: 5/16/17  Future Office visit:       Routing refill request to provider for review/approval because:  Drug not on the [facility], [facility] or [facility] refill protocol or controlled substance

## 2017-08-17 DIAGNOSIS — K05.10 GINGIVITIS, CHRONIC: ICD-10-CM

## 2017-08-17 RX ORDER — SODIUM FLUORIDE 5 MG/ML
PASTE, DENTIFRICE DENTAL
Qty: 57 G | Refills: 12 | Status: SHIPPED | OUTPATIENT
Start: 2017-08-17 | End: 2021-06-01

## 2017-08-17 NOTE — TELEPHONE ENCOUNTER
Sodium Fluoride (SF 5000 PLUS) 1.1 % CREA      Last Written Prescription Date:  7-28-16  Last Fill Quantity: 57g,   # refills: 12  Last Office Visit with [facility] or [facility] prescribing provider: 5-16-17  Future Office visit:       Routing refill request to provider for review/approval because:  Drug not on the [facility] or [facility] refill protocol or controlled substance

## 2017-08-18 ENCOUNTER — TELEPHONE (OUTPATIENT)
Dept: FAMILY MEDICINE | Facility: CLINIC | Age: 57
End: 2017-08-18

## 2017-08-18 DIAGNOSIS — I10 ESSENTIAL HYPERTENSION WITH GOAL BLOOD PRESSURE LESS THAN 140/90: Chronic | ICD-10-CM

## 2017-08-18 RX ORDER — METOPROLOL SUCCINATE 100 MG/1
100 TABLET, EXTENDED RELEASE ORAL DAILY
Qty: 90 TABLET | Refills: 3 | Status: SHIPPED | OUTPATIENT
Start: 2017-08-18 | End: 2017-11-28

## 2017-08-18 NOTE — TELEPHONE ENCOUNTER
Received faxed message from Kevin Oswald .[phone] Fax [phone]    Medication: Atenolol 50mg tablets    Medication is on backorder. Requesting an alternative. Thanks!

## 2017-08-21 ENCOUNTER — MYC MEDICAL ADVICE (OUTPATIENT)
Dept: FAMILY MEDICINE | Facility: CLINIC | Age: 57
End: 2017-08-21

## 2017-08-21 NOTE — TELEPHONE ENCOUNTER
Please see MyChart message below.  Patient is wondering if she's on the right dosage of Metoprolol.    Routed to PCP.    Christine Barron RN  [facility]

## 2017-08-28 DIAGNOSIS — F41.9 ANXIETY: ICD-10-CM

## 2017-08-28 RX ORDER — DIAZEPAM 10 MG
TABLET ORAL
Qty: 30 TABLET | Refills: 0 | Status: CANCELLED | OUTPATIENT
Start: 2017-08-28

## 2017-08-28 NOTE — TELEPHONE ENCOUNTER
diazepam (VALIUM) 10 MG tablet      Last Written Prescription Date:  6-8-17  Last Fill Quantity: 30,   # refills: 0  Last Office Visit with [facility] or [facility] prescribing provider: 5-16-17  Future Office visit:       Routing refill request to provider for review/approval because:  Drug not on the [facility], [facility] or [facility] refill protocol or controlled substance

## 2017-08-29 RX ORDER — DIAZEPAM 10 MG
10 TABLET ORAL DAILY PRN
Qty: 30 TABLET | Refills: 0 | Status: SHIPPED | OUTPATIENT
Start: 2017-08-29 | End: 2017-10-25

## 2017-09-05 ENCOUNTER — TELEPHONE (OUTPATIENT)
Dept: FAMILY MEDICINE | Facility: CLINIC | Age: 57
End: 2017-09-05

## 2017-09-05 DIAGNOSIS — J44.9 COPD, MODERATE (H): Primary | Chronic | ICD-10-CM

## 2017-09-05 NOTE — TELEPHONE ENCOUNTER
PA is needed for the medication, Dulera. Would you like to start PA or Rx alternative medication? If PA, please list all medications this patient has tried along with any contraindications that may have been experienced in the past. Thank you.    COVERED ALTERNATIVE IS FLUTICASONE SALMETEROLE AND PATIENT IS OKAY WITH ALTERNATIVE     Pharmacy: [facility]  Phone: [phone]    Insurance Plan: Blue-Plus  Phone: [phone]  Pt ID: [id number]  Group:     Forwarded to Dr. Bermudez for review.

## 2017-09-07 ENCOUNTER — TELEPHONE (OUTPATIENT)
Dept: FAMILY MEDICINE | Facility: CLINIC | Age: 57
End: 2017-09-07

## 2017-09-07 DIAGNOSIS — J44.9 COPD, MODERATE (H): Primary | Chronic | ICD-10-CM

## 2017-09-07 NOTE — TELEPHONE ENCOUNTER
PA is needed for the medication, advair. Would you like to start PA or Rx alternative medication? If PA, please list all medications this patient has tried along with any contraindications that may have been experienced in the past. Thank you.        PA done thru cover my meds. Will await reply.    Pharmacy: [facility]  Phone: [phone]    Insurance Plan: Blue Plus  Phone:   Pt ID:   Group:

## 2017-09-08 ENCOUNTER — MYC MEDICAL ADVICE (OUTPATIENT)
Dept: FAMILY MEDICINE | Facility: CLINIC | Age: 57
End: 2017-09-08

## 2017-09-08 DIAGNOSIS — F90.9 ADULT ADHD: ICD-10-CM

## 2017-09-08 RX ORDER — DEXTROAMPHETAMINE SACCHARATE, AMPHETAMINE ASPARTATE MONOHYDRATE, DEXTROAMPHETAMINE SULFATE AND AMPHETAMINE SULFATE 2.5; 2.5; 2.5; 2.5 MG/1; MG/1; MG/1; MG/1
10 CAPSULE, EXTENDED RELEASE ORAL DAILY
Qty: 30 CAPSULE | Refills: 0 | Status: SHIPPED | OUTPATIENT
Start: 2017-09-08 | End: 2018-08-20

## 2017-09-08 NOTE — TELEPHONE ENCOUNTER
Controlled Substance Refill Request for Adderall 30mg    Last refill: 7/31/17 - 30qty    Last clinic visit: 5/16/17     Next appt: None with PCP    Controlled substance agreement on file: No.    Documentation in problem list reviewed:  Yes    Processing:  Patient will  in clinic       Christine Barron RN  [facility]

## 2017-09-08 NOTE — TELEPHONE ENCOUNTER
Message left that script was brought to [facility]. Patient needs to be seen for a controlled substance contract prior to next refill, asked patient to return call regarding this message.

## 2017-09-12 ENCOUNTER — TELEPHONE (OUTPATIENT)
Dept: FAMILY MEDICINE | Facility: CLINIC | Age: 57
End: 2017-09-12

## 2017-09-12 DIAGNOSIS — J44.9 COPD, MODERATE (H): Primary | Chronic | ICD-10-CM

## 2017-09-12 RX ORDER — ALBUTEROL SULFATE 90 UG/1
2 AEROSOL, METERED RESPIRATORY (INHALATION) EVERY 6 HOURS PRN
Qty: 1 INHALER | Refills: 1 | Status: SHIPPED | OUTPATIENT
Start: 2017-09-12 | End: 2021-06-01

## 2017-09-12 NOTE — TELEPHONE ENCOUNTER
Received faxed request from Kevin Oswald. Vin.[phone] Fax [phone]    Georgette Randall [birth year] is on spiriva and albuterol, but you sent in combivent. Should she be changing? Also advair was not covered, symbicort would be covered, please let us know. Thanks.

## 2017-09-12 NOTE — TELEPHONE ENCOUNTER
Called pharmacist, informed of Albuterol Rx sent and D/C'ed Combivent.  She does not know what would be covered, need to call Insurance number : [phone].    Flagging for CHOCO Barron RN  [facility]

## 2017-09-12 NOTE — TELEPHONE ENCOUNTER
Okay, the symbicort is not equivalent to advair.  I need to know what is covered for her, equivalent to ADVAIR.

## 2017-09-28 ENCOUNTER — TELEPHONE (OUTPATIENT)
Dept: FAMILY MEDICINE | Facility: CLINIC | Age: 57
End: 2017-09-28

## 2017-09-28 DIAGNOSIS — J44.9 COPD, MODERATE (H): Primary | Chronic | ICD-10-CM

## 2017-09-28 NOTE — TELEPHONE ENCOUNTER
PA is needed for the medication, Dulera. Would you like to start PA or Rx alternative medication? If PA, please list all medications this patient has tried along with any contraindications that may have been experienced in the past. Thank you.    COVERED ALTERNATIVE IS AIRDUO AND SYMBICORT  [facility]: [email or web address]  Phone: [phone]    Insurance Plan: Blue Plus  Phone:   Pt ID:   Group:     Forwarded to  for review.

## 2017-09-29 RX ORDER — BUDESONIDE AND FORMOTEROL FUMARATE DIHYDRATE 160; 4.5 UG/1; UG/1
2 AEROSOL RESPIRATORY (INHALATION) 2 TIMES DAILY
Qty: 3 INHALER | Refills: 3 | Status: SHIPPED | OUTPATIENT
Start: 2017-09-29 | End: 2018-08-20

## 2017-10-25 DIAGNOSIS — F41.9 ANXIETY: ICD-10-CM

## 2017-10-26 RX ORDER — DIAZEPAM 10 MG
10 TABLET ORAL DAILY PRN
Qty: 30 TABLET | Refills: 0 | Status: SHIPPED | OUTPATIENT
Start: 2017-10-26 | End: 2017-11-30

## 2017-10-26 NOTE — TELEPHONE ENCOUNTER
Routing refill request to provider for review/approval because:  Drug not on the [facility] refill protocol   Last refill on 8/28/18 # 30  Last OV 5/16/17  Carmen Webb RN [facility] Triage.

## 2017-10-31 ENCOUNTER — TELEPHONE (OUTPATIENT)
Dept: FAMILY MEDICINE | Facility: CLINIC | Age: 57
End: 2017-10-31

## 2017-11-14 DIAGNOSIS — M15.0 PRIMARY OSTEOARTHRITIS INVOLVING MULTIPLE JOINTS: ICD-10-CM

## 2017-11-15 NOTE — TELEPHONE ENCOUNTER
Requested Prescriptions   Pending Prescriptions Disp Refills     acetaminophen-codeine (TYLENOL #3) 300-30 MG per tablet [Pharmacy Med Name: ACETAMINOPHEN/COD #3 (300/30MG) TAB] 120 tablet 0     Sig: TAKE 1 TO 2 TABLETS BY MOUTH EVERY 4 HOURS AS NEEDED FOR PAIN    There is no refill protocol information for this order        Last OV 5/16/17    Routing refill request to provider for review/approval because:  Drug not on the [facility] refill protocol   Carmen Webb RN [facility] Triage.          Last refill 8/8/17 # 120

## 2017-11-27 ENCOUNTER — MYC MEDICAL ADVICE (OUTPATIENT)
Dept: FAMILY MEDICINE | Facility: CLINIC | Age: 57
End: 2017-11-27

## 2017-11-27 NOTE — TELEPHONE ENCOUNTER
Patient was last seen 5/16/17 by [facility].    BP Readings from Last 3 Encounters:   05/16/17 122/76   04/04/17 (!) 137/91   02/20/17 135/87     See her [employer] message below.   I called and spoke to her on her cell phone.  She denies chest pain, dizziness, blurry vision, numbness, tingling or one-sided weakness.   She has COPD but no unusual shortness of breath.    Scheduled to see Monica Hull tomorrow, patient cannot come in today.  Advised her to seek eval in ER for blurry vision, dizziness, bad headache, sudden weakness on one side.    Patient verbalized understanding of and agreement with plan.  Rosmery Marquez RN  [facility]

## 2017-11-28 ENCOUNTER — OFFICE VISIT (OUTPATIENT)
Dept: FAMILY MEDICINE | Facility: CLINIC | Age: 57
End: 2017-11-28
Payer: COMMERCIAL

## 2017-11-28 VITALS
TEMPERATURE: 97.1 F | OXYGEN SATURATION: 100 % | WEIGHT: 138 LBS | HEART RATE: 71 BPM | DIASTOLIC BLOOD PRESSURE: 89 MMHG | BODY MASS INDEX: 23.32 KG/M2 | SYSTOLIC BLOOD PRESSURE: 133 MMHG

## 2017-11-28 DIAGNOSIS — Z12.31 ENCOUNTER FOR SCREENING MAMMOGRAM FOR BREAST CANCER: ICD-10-CM

## 2017-11-28 DIAGNOSIS — I10 ESSENTIAL HYPERTENSION WITH GOAL BLOOD PRESSURE LESS THAN 140/90: Primary | Chronic | ICD-10-CM

## 2017-11-28 DIAGNOSIS — N94.9 GENITAL LESION, FEMALE: ICD-10-CM

## 2017-11-28 DIAGNOSIS — R06.02 SOB (SHORTNESS OF BREATH): ICD-10-CM

## 2017-11-28 DIAGNOSIS — N95.2 VAGINAL ATROPHY: ICD-10-CM

## 2017-11-28 PROCEDURE — 99214 OFFICE O/P EST MOD 30 MIN: CPT | Performed by: NURSE PRACTITIONER

## 2017-11-28 RX ORDER — LOSARTAN POTASSIUM AND HYDROCHLOROTHIAZIDE 25; 100 MG/1; MG/1
1 TABLET ORAL DAILY
Qty: 30 TABLET | Refills: 0 | Status: SHIPPED | OUTPATIENT
Start: 2017-11-28 | End: 2018-03-14

## 2017-11-28 RX ORDER — ATENOLOL 50 MG/1
75 TABLET ORAL DAILY
Qty: 45 TABLET | Refills: 0 | Status: SHIPPED | OUTPATIENT
Start: 2017-11-28 | End: 2017-11-30 | Stop reason: DRUGHIGH

## 2017-11-28 RX ORDER — ESTRADIOL 0.1 MG/G
2 CREAM VAGINAL
Qty: 40 G | Refills: 3 | Status: SHIPPED | OUTPATIENT
Start: 2017-11-30 | End: 2018-08-20

## 2017-11-28 RX ORDER — ATENOLOL 50 MG/1
50 TABLET ORAL 3 TIMES DAILY
COMMUNITY
End: 2017-11-30 | Stop reason: DRUGHIGH

## 2017-11-28 ASSESSMENT — PAIN SCALES - GENERAL: PAINLEVEL: NO PAIN (0)

## 2017-11-28 NOTE — PROGRESS NOTES
"  SUBJECTIVE:   Georgette Pereira is a 57 year old female who presents to clinic today for the following health issues:      Hypertension Follow-up      Outpatient blood pressures are being checked at home and store.  Results are usually high at the stores and home.    Low Salt Diet: no added salt    Amount of exercise or physical activity: None    Problems taking medications regularly: No    Medication side effects: none    Diet: regular (no restrictions)    She has been checking her blood pressure daily at home  -180s  Under a lot of stress: fiance and mom in the hospital  \"I just don't feel well\"  Headache \"exhausted\"    She is currently taking:  Atenolol 150 mg once daily  Losartan 75 mg once daily    Her insurance runs out at the end of the month    Hx COPD  She is a member of a gym  Will get SOB with walking up 1 flight of stairs  Feels like mobility is limited by SOB  Family hx CAD  Normal stress test in Feb 2015    Last saw her primary care provider, Dr. Bermudez May 2017  Referred to pulmonary  She has not gone        Vaginal Symptoms  Onset: 1 month    Description:  Vaginal Discharge: none   Itching (Pruritis): YES- the lump itches  Burning sensation:  no   Odor: no     Accompanying Signs & Symptoms:  Pain with Urination: no   Abdominal Pain: no   Fever: no     History:   Sexually active: YES  New Partner: no   Possibility of Pregnancy:  No    Precipitating factors:   Recent Antibiotic Use: no     Alleviating factors:  nothing    Therapies Tried and outcome:     Tried vagisil without improvement in symptoms  \"bumps\" in pubic area  Itch improving      Problem list and histories reviewed & adjusted, as indicated.  Additional history: none    Patient Active Problem List   Diagnosis     Lung nodule     Anxiety     Fibromyalgia     Knee pain     History of pneumonia, recurrent     Internal hemorrhoids with other complication     COPD, moderate (H)     Adult ADHD     Family history of early CAD     " Hyperlipidemia LDL goal <130     Essential hypertension with goal blood pressure less than 140/90     Primary osteoarthritis involving multiple joints     Advanced directives, counseling/discussion     Past Surgical History:   Procedure Laterality Date     C TMJ ARTHROSCOPY/SURGERY  2000     HYSTERECTOMY VAGINAL         Social History   Substance Use Topics     Smoking status: Former Smoker     Packs/day: 1.00     Years: 32.00     Types: Cigarettes, Other     Quit date: 12/31/2012     Smokeless tobacco: Never Used      Comment: using nicotine replacement: e cigarattes     Alcohol use No     Family History   Problem Relation Age of Onset     Breast Cancer Maternal Grandmother      Coronary Artery Disease Maternal Grandmother      MI at 41     CEREBROVASCULAR DISEASE Mother      Hypertension Mother      HEART DISEASE Mother      CANCER Mother      Lung     Substance Abuse Mother      CEREBROVASCULAR DISEASE Father      Hypertension Father      CANCER Father      Metastatic, primary lung/Prostate     Substance Abuse Father      CANCER Other      Substance Abuse Brother      Depression Son      Schizophrenia Brother      Bipolar Disorder Brother      Substance Abuse Brother      Aneurysm Paternal Uncle      Brain Aneurysms         Current Outpatient Prescriptions   Medication Sig Dispense Refill     mometasone-formoterol (DULERA) 200-5 MCG/ACT oral inhaler Inhale 2 puffs into the lungs 2 times daily       losartan-hydrochlorothiazide (HYZAAR) 100-25 MG per tablet Take 1 tablet by mouth daily 30 tablet 0     estradiol (ESTRACE VAGINAL) 0.1 MG/GM cream Place 2 g vaginally twice a week Okay to substitute with preferred equivalent 40 g 3     [DISCONTINUED] atenolol (TENORMIN) 50 MG tablet Take 50 mg by mouth 3 times daily       [DISCONTINUED] atenolol (TENORMIN) 50 MG tablet Take 1.5 tablets (75 mg) by mouth daily 45 tablet 0     diazepam (VALIUM) 10 MG tablet Take 1 tablet (10 mg) by mouth daily as needed for anxiety or  sleep Maximum one in 24 hours. 30 tablet 0     budesonide-formoterol (SYMBICORT) 160-4.5 MCG/ACT Inhaler Inhale 2 puffs into the lungs 2 times daily 3 Inhaler 3     albuterol (PROAIR HFA/PROVENTIL HFA/VENTOLIN HFA) 108 (90 BASE) MCG/ACT Inhaler Inhale 2 puffs into the lungs every 6 hours as needed for shortness of breath / dyspnea or wheezing 1 Inhaler 1     Sodium Fluoride (SF 5000 PLUS) 1.1 % CREA Use for oral hygiene twice daily 57 g 12     DULoxetine (CYMBALTA) 60 MG EC capsule TAKE 1 CAPSULE(60 MG) BY MOUTH DAILY 90 capsule 3     lidocaine (XYLOCAINE) 2 % topical gel Apply topically 2 times daily as needed for moderate pain 30 mL 3     albuterol (2.5 MG/3ML) 0.083% neb solution Take 1 vial (2.5 mg) by nebulization every 6 hours as needed for shortness of breath / dyspnea or wheezing 60 vial 6     tiotropium (SPIRIVA) 18 MCG capsule Inhale 1 capsule (18 mcg) into the lungs daily 90 capsule 3     atenolol (TENORMIN) 100 MG tablet Take 1 tablet (100 mg) by mouth daily 30 tablet 0     lidocaine (LIDODERM) 5 % Patch Apply up to 3 patches to painful area at once for up to 12 h within a 24 h period.  Remove after 12 hours. 30 patch 1     amphetamine-dextroamphetamine (ADDERALL XR) 10 MG per 24 hr capsule Take 1 capsule (10 mg) by mouth daily May fill on or after 9/8/17   NEEDS TO BE SEEN FOR CONTROLLED SUBSTANCE CONTRACT PRIOR TO NEXT REFILL (Patient not taking: Reported on 11/28/2017) 30 capsule 0     fluticasone-salmeterol (ADVAIR) 250-50 MCG/DOSE diskus inhaler Inhale 1 puff into the lungs 2 times daily 3 Inhaler 3     ibuprofen (ADVIL,MOTRIN) 200 MG tablet Take 800 mg by mouth every 4 hours as needed for pain Reported on 2/20/2017 100 tablet 3     BP Readings from Last 3 Encounters:   11/28/17 133/89   05/16/17 122/76   04/04/17 (!) 137/91    Wt Readings from Last 3 Encounters:   11/28/17 138 lb (62.6 kg)   05/16/17 145 lb (65.8 kg)   04/04/17 148 lb (67.1 kg)                        Reviewed and updated as needed  this visit by clinical staffTobacco  Allergies  Meds  Med Hx  Surg Hx  Fam Hx  Soc Hx      Reviewed and updated as needed this visit by Provider         ROS:  Constitutional, HEENT, cardiovascular, pulmonary, gi and gu systems are negative, except as otherwise noted.      OBJECTIVE:   /89 (BP Location: Right arm, Patient Position: Chair, Cuff Size: Adult Regular)  Pulse 71  Temp 97.1  F (36.2  C) (Oral)  Wt 138 lb (62.6 kg)  SpO2 100%  BMI 23.32 kg/m2  Body mass index is 23.32 kg/(m^2).  GENERAL: healthy, alert and no distress  NECK: no adenopathy, no asymmetry, masses, or scars and thyroid normal to palpation  RESP: lungs clear to auscultation - no rales, rhonchi or wheezes  CV: regular rate and rhythm, normal S1 S2, no S3 or S4, no murmur, click or rub, no peripheral edema and peripheral pulses strong   (female): 2 scabbed over erythematous papules in pubic region    Diagnostic Test Results:  none     ASSESSMENT/PLAN:   1. Essential hypertension with goal blood pressure less than 140/90  - Her BP is below goal in clinic yet she reports quite elevated readings at home. She has few heart rates in 50-60s and I do not want to drop heart rate too low. Decrease atenolol and will increase losartan and add diuretic. Follow up in 2 weeks  - losartan-hydrochlorothiazide (HYZAAR) 100-25 MG per tablet; Take 1 tablet by mouth daily  Dispense: 30 tablet; Refill: 0  - Basic metabolic panel  (Ca, Cl, CO2, Creat, Gluc, K, Na, BUN); Future    2. Encounter for screening mammogram for breast cancer  - MA Screening Digital Bilateral; Future    3. Vaginal atrophy  - She requests refill  - estradiol (ESTRACE VAGINAL) 0.1 MG/GM cream; Place 2 g vaginally twice a week Okay to substitute with preferred equivalent  Dispense: 40 g; Refill: 3    4. Genital lesion, female  - Lesion appears to be healing. Consider HSV vs folliculitis. Unable to do viral culture as it's healing. Advised can apply gentle moisturizer, sitz bath  as needed. If develops new lesions, can return for viral culture    5. SOB (Shortness of Breath)  - She was previously advised by primary care provider to see pulmonary. She is again advised to do this. If symptoms continue, or worsen, despite managing COPD, consider stress test    She asks for refill of Adderall. I declined. Looks like she was already told by primary care provider she needs office visit for controlled substance agreement. She will follow up with primary care provider to discuss. Also wonder whether this medication is contributing to variable blood pressures    Patient Instructions   Schedule appointment with pulmonary as previously recommended by Dr. Jean    Follow up in 2 weeks      KATHRYN [facility]

## 2017-11-28 NOTE — NURSING NOTE
"Chief Complaint   Patient presents with     Hypertension     Mass     Has a itchy lump on labia       Initial /89 (BP Location: Right arm, Patient Position: Chair, Cuff Size: Adult Regular)  Pulse 71  Temp 97.1  F (36.2  C) (Oral)  Wt 138 lb (62.6 kg)  SpO2 100%  BMI 23.32 kg/m2 Estimated body mass index is 23.32 kg/(m^2) as calculated from the following:    Height as of 1/19/17: 5' 4.5\" (1.638 m).    Weight as of this encounter: 138 lb (62.6 kg).  Medication Reconciliation: complete.  SALVADOR Rousseau MA      "

## 2017-11-28 NOTE — MR AVS SNAPSHOT
After Visit Summary   11/28/2017    Georgette Pereira    MRN: [id number]           Patient Information     Date Of Birth          1960        Visit Information        Provider Department      11/28/2017 9:40 AM Monica Hull APRN CNP [facility]        Today's Diagnoses     Essential hypertension with goal blood pressure less than 140/90    -  1    Encounter for screening mammogram for breast cancer        Vaginal atrophy          Care Instructions    Schedule appointment with pulmonary as previously recommended by Dr. Jean    Follow up in 2 weeks          Follow-ups after your visit        Your next 10 appointments already scheduled     Nov 29, 2017  1:30 PM CST   LAB with CP LAB   [facility] ([facility])    [address] [facility] [id number] [phone]           Please do not eat 10-12 hours before your appointment if you are coming in fasting for labs on lipids, cholesterol, or glucose (sugar). This does not apply to pregnant women. Water, hot tea and black coffee (with nothing added) are okay. Do not drink other fluids, diet soda or chew gum.            Jan 29, 2018  9:15 AM CST   MA SCREENING DIGITAL BILATERAL with CPMA1   [facility] ([facility])    [address] [id number] [phone]           Do not use any powder, lotion or deodorant under your arms or on your breast. If you do, we will ask you to remove it before your exam.  Wear comfortable, two-piece clothing.  If you have any allergies, tell your care team.  Bring any previous mammograms from other facilities or have them mailed to the breast center.              Future tests that were ordered for you today     Open Future Orders        Priority Expected Expires Ordered    MA Screening Digital Bilateral Routine  11/28/2018 11/28/2017             Who to contact     If you have questions or need follow up information about today's clinic visit or your schedule please contact [facility] directly at [phone].  Normal or non-critical lab and imaging results will be communicated to you by [employer], letter or phone within 4 business days after the clinic has received the results. If you do not hear from us within 7 days, please contact the clinic through [employer] or phone. If you have a critical or abnormal lab result, we will notify you by phone as soon as possible.  Submit refill requests through [employer] or call your pharmacy and they will forward the refill request to us. Please allow 3 business days for your refill to be completed.          Additional Information About Your Visit        Bugsnag Information     [employer] gives you secure access to your electronic health record. If you see a primary care provider, you can also send messages to your care team and make appointments. If you have questions, please call your primary care clinic.  If you do not have a primary care provider, please call [phone] and they will assist you.        Care EveryWhere ID     This is your Care EveryWhere ID. This could be used by other organizations to access your [place] medical records  [id number]        Your Vitals Were     Pulse Temperature Pulse Oximetry BMI (Body Mass Index)          71 97.1  F (36.2  C) (Oral) 100% 23.32 kg/m2         Blood Pressure from Last 3 Encounters:   11/28/17 133/89   05/16/17 122/76   04/04/17 (!) 137/91    Weight from Last 3 Encounters:   11/28/17 138 lb (62.6 kg)   05/16/17 145 lb (65.8 kg)   04/04/17 148 lb (67.1 kg)              We Performed the Following     BASIC METABOLIC PANEL          Today's Medication Changes          These changes are accurate as of: 11/28/17 10:41 AM.  If you have any questions, ask your nurse or doctor.               Start taking these medicines.        Dose/Directions     losartan-hydrochlorothiazide 100-25 MG per tablet   Commonly known as:  HYZAAR   Used for:  Essential hypertension with goal blood pressure less than 140/90   Started by:  Monica Hull APRN CNP        Dose:  1 tablet   Take 1 tablet by mouth daily   Quantity:  30 tablet   Refills:  0         These medicines have changed or have updated prescriptions.        Dose/Directions    * atenolol 50 MG tablet   Commonly known as:  TENORMIN   This may have changed:  Another medication with the same name was added. Make sure you understand how and when to take each.   Changed by:  Monica Hull APRN CNP        Dose:  50 mg   Take 50 mg by mouth 3 times daily   Refills:  0       * atenolol 50 MG tablet   Commonly known as:  TENORMIN   This may have changed:  You were already taking a medication with the same name, and this prescription was added. Make sure you understand how and when to take each.   Used for:  Essential hypertension with goal blood pressure less than 140/90   Changed by:  Monica Hull APRN CNP        Dose:  75 mg   Take 1.5 tablets (75 mg) by mouth daily   Quantity:  45 tablet   Refills:  0       losartan 25 MG tablet   Commonly known as:  COZAAR   This may have changed:  how much to take   Used for:  Essential hypertension with goal blood pressure less than 140/90        Dose:  50 mg   Take 2 tablets (50 mg) by mouth daily   Quantity:  90 tablet   Refills:  3       * Notice:  This list has 2 medication(s) that are the same as other medications prescribed for you. Read the directions carefully, and ask your doctor or other care provider to review them with you.         Where to get your medicines      These medications were sent [place] [facility] [id number]  [place] [address] [address] [facility]  [address] [facility] [address] [facility] [address]    Hours:  24-hours Phone:  [phone]     atenolol 50 MG tablet    estradiol 0.1 MG/GM cream     losartan-hydrochlorothiazide 100-25 MG per tablet                Primary Care Provider Office Phone # Fax #    Yue Bermudez [phone]       [address] [facility] [address]        Equal Access to Services     ALINE MEJIA : Hadii rogers kerr kereno Soomaali, waaxda luqadaha, qaybta kaalmada adesilvanoda, mirela dalein hayaacarolina silvapatel horton harjit sanchez. So [facility] [phone].    ATENCIÓN: Si habla español, tiene a manuel disposición servicios gratuitos de asistencia lingüística. Llame al [phone].    We comply with applicable federal civil rights laws and [place] laws. We do not discriminate on the basis of race, color, national origin, age, disability, sex, sexual orientation, or gender identity.            Thank you!     Thank you for choosing [facility]  for your care. Our goal is always to provide you with excellent care. Hearing back from our patients is one way we can continue to improve our services. Please take a few minutes to complete the written survey that you may receive in the mail after your visit with us. Thank you!             Your Updated Medication List - Protect others around you: Learn how to safely use, store and throw away your medicines at www.disposemymeds.org.          This list is accurate as of: 11/28/17 10:41 AM.  Always use your most recent med list.                   Brand Name Dispense Instructions for use Diagnosis    * albuterol (2.5 MG/3ML) 0.083% neb solution     60 vial    Take 1 vial (2.5 mg) by nebulization every 6 hours as needed for shortness of breath / dyspnea or wheezing    COPD, moderate (H)       * albuterol 108 (90 BASE) MCG/ACT Inhaler    PROAIR HFA/PROVENTIL HFA/VENTOLIN HFA    1 Inhaler    Inhale 2 puffs into the lungs every 6 hours as needed for shortness of breath / dyspnea or wheezing    COPD, moderate (H)       amphetamine-dextroamphetamine 10 MG per 24 hr capsule    ADDERALL XR    30 capsule    Take 1 capsule (10  mg) by mouth daily May fill on or after 9/8/17   NEEDS TO BE SEEN FOR CONTROLLED SUBSTANCE CONTRACT PRIOR TO NEXT REFILL    Adult ADHD       * atenolol 50 MG tablet    TENORMIN     Take 50 mg by mouth 3 times daily        * atenolol 50 MG tablet    TENORMIN    45 tablet    Take 1.5 tablets (75 mg) by mouth daily    Essential hypertension with goal blood pressure less than 140/90       budesonide-formoterol 160-4.5 MCG/ACT Inhaler    SYMBICORT    3 Inhaler    Inhale 2 puffs into the lungs 2 times daily    COPD, moderate (H)       diazepam 10 MG tablet    VALIUM    30 tablet    Take 1 tablet (10 mg) by mouth daily as needed for anxiety or sleep Maximum one in 24 hours.    Anxiety       DULoxetine 60 MG EC capsule    CYMBALTA    90 capsule    TAKE 1 CAPSULE(60 MG) BY MOUTH DAILY    Anxiety, Fibromyalgia       estradiol 0.1 MG/GM cream   Start taking on:  11/30/2017    ESTRACE VAGINAL    40 g    Place 2 g vaginally twice a week Okay to substitute with preferred equivalent    Vaginal atrophy       fluticasone-salmeterol 250-50 MCG/DOSE diskus inhaler    ADVAIR    3 Inhaler    Inhale 1 puff into the lungs 2 times daily    COPD, moderate (H)       ibuprofen 200 MG tablet    ADVIL/MOTRIN    100 tablet    Take 800 mg by mouth every 4 hours as needed for pain Reported on 2/20/2017    Neck pain       lidocaine 2 % topical gel    XYLOCAINE    30 mL    Apply topically 2 times daily as needed for moderate pain    Fibromyalgia       losartan 25 MG tablet    COZAAR    90 tablet    Take 2 tablets (50 mg) by mouth daily    Essential hypertension with goal blood pressure less than 140/90       losartan-hydrochlorothiazide 100-25 MG per tablet    HYZAAR    30 tablet    Take 1 tablet by mouth daily    Essential hypertension with goal blood pressure less than 140/90       mometasone-formoterol 200-5 MCG/ACT oral inhaler    DULERA     Inhale 2 puffs into the lungs 2 times daily        Sodium Fluoride 1.1 % Crea    SF 5000 PLUS    57 g     Use for oral hygiene twice daily    Gingivitis, chronic       tiotropium 18 MCG capsule    SPIRIVA    90 capsule    Inhale 1 capsule (18 mcg) into the lungs daily    COPD, moderate (H)       * Notice:  This list has 4 medication(s) that are the same as other medications prescribed for you. Read the directions carefully, and ask your doctor or other care provider to review them with you.

## 2017-11-30 ENCOUNTER — MYC MEDICAL ADVICE (OUTPATIENT)
Dept: FAMILY MEDICINE | Facility: CLINIC | Age: 57
End: 2017-11-30

## 2017-11-30 DIAGNOSIS — I10 ESSENTIAL HYPERTENSION WITH GOAL BLOOD PRESSURE LESS THAN 140/90: Primary | Chronic | ICD-10-CM

## 2017-11-30 DIAGNOSIS — M79.7 FIBROMYALGIA: Chronic | ICD-10-CM

## 2017-11-30 DIAGNOSIS — I10 ESSENTIAL HYPERTENSION WITH GOAL BLOOD PRESSURE LESS THAN 140/90: Chronic | ICD-10-CM

## 2017-11-30 DIAGNOSIS — F41.9 ANXIETY: ICD-10-CM

## 2017-11-30 RX ORDER — ATENOLOL 100 MG/1
100 TABLET ORAL DAILY
Qty: 30 TABLET | Refills: 0 | Status: SHIPPED | OUTPATIENT
Start: 2017-11-30 | End: 2018-09-17

## 2017-11-30 RX ORDER — LIDOCAINE 50 MG/G
PATCH TOPICAL
Qty: 30 PATCH | Refills: 1 | Status: SHIPPED | OUTPATIENT
Start: 2017-11-30 | End: 2019-04-09

## 2017-11-30 RX ORDER — DIAZEPAM 10 MG
10 TABLET ORAL DAILY PRN
Qty: 30 TABLET | Refills: 0 | Status: SHIPPED | OUTPATIENT
Start: 2017-11-30 | End: 2018-08-20

## 2017-11-30 NOTE — TELEPHONE ENCOUNTER
Routed request for valium refill to [facility] to advise.  Rosmery Marquez RN  [facility]

## 2017-11-30 NOTE — TELEPHONE ENCOUNTER
I increased dose of losartan and added HCTZ.   I don't want her BP to drop too low so I wanted to decrease the dose of atenolol. 100 mg is the max dose of atenolol. She should not be taking 150 mg.   I sent in a new prescription for 100 mg tablets.   I sent in lidocaine patches. I'm not sure if it will be covered and I have not seen her for this issue, but we can see if its covered  She is supposed to follow up with Dr. Bermudez in 2 weeks so she should get this scheduled

## 2017-11-30 NOTE — TELEPHONE ENCOUNTER
"I called patient to discuss this by phone.   She states she has atenolol 50 mg tabs.  I advised she take 2 daily and then has 100 mg tabs to refill at pharmacy.   She is not sure if she has losartan plain or losartan-hctz but says the new pills are bigger so maybe is the combo.  Advised her to check on the lidocaine patches sent, may or may not be covered by insurance.    She also states she needs valium refilled.   Will route that to Dr. Bermudez.   She declines to schedule at this time as is unsure of her insurance coverage, when it will resume.    I called pharmacy to confirm what patient was recently dispensed.    They did just receive the 100 mg atenolol Rx, I advised I will \"refuse\" the 50 mg request I see they sent to us today.  Patient did get the losartan-hctz 100-25 combo on 11/28.    MA now tells me a PA request for the lidocaine patches just came in.      I called patient at home number, left message for call back to RN line.   Also sent [employer] response to patient advising of the above.    Rosmery Marquez RN  [facility]      "

## 2017-11-30 NOTE — TELEPHONE ENCOUNTER
See patient's MyChart note, she is confused on her atenolol dosing.    I don't see that a change was made when patient last seen by Monica Hull 2 days ago, see note:    She is currently taking:  Atenolol 150 mg once daily  Losartan 75 mg once daily        Routed to Monica to address refill of atenolol and to clarify dosing, possibly send NEW Rx for lidocaine patches instead of gel.  She has 2 atenolol's on Epic list and losartan AND losartan with the HCTZ combo listed as well.    Rosmery Marquez RN  [facility]

## 2017-12-01 ENCOUNTER — TELEPHONE (OUTPATIENT)
Dept: FAMILY MEDICINE | Facility: CLINIC | Age: 57
End: 2017-12-01

## 2017-12-01 RX ORDER — ATENOLOL 50 MG/1
TABLET ORAL
Qty: 90 TABLET | Refills: 0 | OUTPATIENT
Start: 2017-12-01

## 2017-12-01 NOTE — TELEPHONE ENCOUNTER
"atenolol 50 mg (take 2 daily)  Last Written Prescription Date: 11/30/17 for 100 mg daily  Last Fill Quantity: 30,  # refills: 0   Last Office Visit with [facility], P or [facility] prescribing provider: 11/28/17    \"Refusal\" routed back to pharmacy with note.  Just had 100 mg refill sent.  Rosmery Marquez RN  [facility]        .                                               "

## 2017-12-04 NOTE — TELEPHONE ENCOUNTER
Notify patient (see written).  Only covered for post herpetic neuralgia or cancer pain at this point, as far as RX coverage.  But I believe she can get these patches otc without a prescription.

## 2018-01-18 ENCOUNTER — TELEPHONE (OUTPATIENT)
Dept: FAMILY MEDICINE | Facility: CLINIC | Age: 58
End: 2018-01-18

## 2018-01-18 NOTE — TELEPHONE ENCOUNTER
Panel Management Review      Patient has the following on her problem list:     Hypertension   Last three blood pressure readings:  BP Readings from Last 3 Encounters:   11/28/17 133/89   05/16/17 122/76   04/04/17 (!) 137/91     Blood pressure: Passed    HTN Guidelines:  Age 18-59 BP range:  Less than 140/90  Age 60-85 with Diabetes:  Less than 140/90  Age 60-85 without Diabetes:  less than 150/90        Composite cancer screening  Chart review shows that this patient is due/due soon for the following Mammogram  Summary:    Patient is due/failing the following:   MAMMOGRAM    Action needed:   Due for a mammogram     Type of outreach:    Sent letter.    Questions for provider review:    None                                                                                                                                    Lisa Perdue ma       Chart routed to Care Team .

## 2018-01-18 NOTE — LETTER
January 18, 2018    Georgette Pereira  [address]  [place] [address]    Dear Georgette    We care about your health and have reviewed your health plan. We have reviewed your medical conditions, medication list, and lab results and are making recommendations based on this review, to better manage your health.    You are in particular need of attention regarding:  - Scheduling a Breast Cancer Screening (Mammography) [phone]      Here is a list of Health Maintenance topics that are due now or due soon:  Health Maintenance Due   Topic Date Due     COPD ACTION PLAN Q1 YR  1960     URINE DRUG SCREEN Q1 YR  10/11/1975     BMP Q1 YR  08/12/2017     MAMMO SCREEN Q2 YR (SYSTEM ASSIGNED)  08/13/2017     INFLUENZA VACCINE (SYSTEM ASSIGNED)  09/01/2017     We will be calling you in the next couple of weeks to help you schedule any appointments that are needed.  Please call us at [phone] (or use [employer]) to address the above recommendations.     Thank you for trusting [facility] and we appreciate the opportunity to serve you.  We look forward to supporting your healthcare needs in the future.    Healthy Regards,    Dr. Bermudez/adis

## 2018-01-18 NOTE — LETTER
January 30, 2018    Georgette Pereira  [address]  [place] [address]      Dear Georgette Pereira,     We have tried to contact you about your health, but have been unable to reach you.  Please call us as soon as possible so we can provide you with the best care possible.  We will continue to check in with you throughout the year to complete these items of care, if you are not able to complete these items at this time.  If you would like to complete the missing items for your care, please contact us at [phone].    We recommend the following:  -schedule a MAMMOGRAM 1 in 8 women will develop invasive breast cancer during her lifetime and it is the most common non-skin cancer in American women.  EARLY detection, new treatments, and a better understanding of the disease have increased survival rates - the 5 year survival rate in the 1960s was 63% and today it is close to 90% .  Please disregard this reminder if you have had this exam elsewhere within the last year.  It would be helpful for us to have a copy of your mammogram report in our file so that we can best coordinate your care - please contact us with when your test was done so we can update your record. Please call [phone] to schedule your mammogram today.         Sincerely,     Your Care Team at [place]

## 2018-03-14 DIAGNOSIS — I10 ESSENTIAL HYPERTENSION WITH GOAL BLOOD PRESSURE LESS THAN 140/90: Chronic | ICD-10-CM

## 2018-03-14 RX ORDER — LOSARTAN POTASSIUM AND HYDROCHLOROTHIAZIDE 25; 100 MG/1; MG/1
1 TABLET ORAL DAILY
Qty: 15 TABLET | Refills: 0 | Status: SHIPPED | OUTPATIENT
Start: 2018-03-14 | End: 2018-10-19

## 2018-03-14 NOTE — LETTER
Dear Georgette,         Your Care Team has attempted to reach you multiple times regarding a recent refill request for your losartan-hydrochlorothiazide. A one month supply has been provided to your pharmacy at this time. You were advised to have follow up labs and did not show for your appointment in December. We would also like you to schedule a visit the same day as your lab appointment with the Clinic RN for a quick blood pressure recheck. Please contact the clinic to schedule these appointments at [phone].      Sincerely,    Yue ALBERTO

## 2018-03-14 NOTE — TELEPHONE ENCOUNTER
Routing refill request to provider for review/approval because:  Labs not current.  Carmen Webb RN [facility] Triage.

## 2018-03-14 NOTE — TELEPHONE ENCOUNTER
"Requested Prescriptions   Pending Prescriptions Disp Refills     losartan-hydrochlorothiazide (HYZAAR) 100-25 MG per tablet [Pharmacy Med Name: LOSARTAN/HCTZ 100/25MG TABLETS] 30 tablet 0    Last Written Prescription Date:  11/28/17  Last Fill Quantity: 30,  # refills: 0   Last office visit: 11/28/2017 with prescribing provider:     Future Office Visit:     Sig: TAKE 1 TABLET BY MOUTH DAILY    Angiotensin-II Receptors Failed    3/14/2018 11:02 AM       Failed - Normal serum creatinine on file in past 12 months    Recent Labs   Lab Test  08/12/16   0851   CR  0.89            Failed - Normal serum potassium on file in past 12 months    Recent Labs   Lab Test  08/12/16   0851   POTASSIUM  3.7                   Passed - Blood pressure under 140/90 in past 12 months    BP Readings from Last 3 Encounters:   11/28/17 133/89   05/16/17 122/76   04/04/17 (!) 137/91                Passed - Recent (12 mo) or future (30 days) visit within the authorizing provider's specialty    Patient had office visit in the last 12 months or has a visit in the next 30 days with authorizing provider or within the authorizing provider's specialty.  See \"Patient Info\" tab in inbasket, or \"Choose Columns\" in Meds & Orders section of the refill encounter.           Passed - Patient is age 18 or older       Passed - No active pregnancy on record       Passed - No positive pregnancy test in past 12 months          "

## 2018-03-16 RX ORDER — LOSARTAN POTASSIUM AND HYDROCHLOROTHIAZIDE 25; 100 MG/1; MG/1
TABLET ORAL
Qty: 30 TABLET | Refills: 0 | Status: SHIPPED | OUTPATIENT
Start: 2018-03-16 | End: 2018-10-18

## 2018-03-16 NOTE — TELEPHONE ENCOUNTER
"Requested Prescriptions   Pending Prescriptions Disp Refills     losartan-hydrochlorothiazide (HYZAAR) 100-25 MG per tablet [Pharmacy Med Name: LOSARTAN/HCTZ 100/25MG TABLETS] 30 tablet 0     Sig: TAKE 1 TABLET BY MOUTH DAILY    Angiotensin-II Receptors Failed    3/14/2018 10:58 AM       Failed - Normal serum creatinine on file in past 12 months    Recent Labs   Lab Test  08/12/16   0851   CR  0.89            Failed - Normal serum potassium on file in past 12 months    Recent Labs   Lab Test  08/12/16   0851   POTASSIUM  3.7                   Passed - Blood pressure under 140/90 in past 12 months    BP Readings from Last 3 Encounters:   11/28/17 133/89   05/16/17 122/76   04/04/17 (!) 137/91                Passed - Recent (12 mo) or future (30 days) visit within the authorizing provider's specialty    Patient had office visit in the last 12 months or has a visit in the next 30 days with authorizing provider or within the authorizing provider's specialty.  See \"Patient Info\" tab in inbasket, or \"Choose Columns\" in Meds & Orders section of the refill encounter.           Passed - Patient is age 18 or older       Passed - No active pregnancy on record       Passed - No positive pregnancy test in past 12 months        Routing refill request to provider for review/approval because:  Failed protocols    Gabi Singh RN  [facility]      "

## 2018-03-16 NOTE — TELEPHONE ENCOUNTER
This med was started 3 months ago and absolutely needs lab follow up.  Lab was ordered & scheduled for mid December '17 d and she was no show for lab visit.     #30 Rx sent, encourage lab visit.  Consider RN visit for quick BP recheck at same time if agreeable. Thank you

## 2018-03-25 ENCOUNTER — MYC MEDICAL ADVICE (OUTPATIENT)
Dept: FAMILY MEDICINE | Facility: CLINIC | Age: 58
End: 2018-03-25

## 2018-03-25 NOTE — LETTER
Dear Georgette,         Your Care Team has attempted to reach you multiple times via [employer] and via telephone and have been unable to make contact with you. Dr Bermudez reviewed your [facility] message and why you have not been in to clinic. They would like to have you be seen by the clinic RN now for a free blood pressure check. They would also like to do some labs as part of that appointment. There will be a fee that is billed for the bloodwork portion of your appointment. Please contact the clinic to schedule a blood pressure check with the RN at [phone].      Sincerely,    Yue ALBERTO

## 2018-03-26 NOTE — TELEPHONE ENCOUNTER
Sent [employer] message to patient, to me it sounds like she might not be on any meds.     Await response, perhaps get her scheduled for RN visit.  Rosmery Marquez RN  [facility]

## 2018-03-26 NOTE — TELEPHONE ENCOUNTER
Routing to PCP to review and advise.    Please see My Chart message.     Gabi Singh RN  [facility]

## 2018-03-26 NOTE — TELEPHONE ENCOUNTER
See if she will come in for free RN visit and a BMP before the 30d supply is out.   We must monitor electrolytes when someone on these meds.  No labs done since late 2016.  BMP lab may incur cost (maybe as lab for approximate prior to calling her)

## 2018-03-28 NOTE — TELEPHONE ENCOUNTER
"Patient has not yet apparently read the last [employer] message to her and nothing is scheduled.  Her first note indicated she would call yesterday \"at the latest\" to schedule to be seen.     BP Readings from Last 3 Encounters:   11/28/17 133/89   05/16/17 122/76   04/04/17 (!) 137/91     Attempted to call patient at home number, left message on voicemail identified as \"Geena\"  requesting call back to clinic to discuss.  Is she on her BP meds?  Need to schedule at least a nurse BP check and lab (see provider note regarding this).  Rosmery aMrquez RN  [facility]    "

## 2018-03-29 NOTE — TELEPHONE ENCOUNTER
"Does not appear my Mychart response to patient has been read yet.    Attempted to call patient at home number, left message on voicemail identified as \"Geena\" requesting call back to clinic to discuss.  Rosmery Marquez RN  [facility]    "

## 2018-03-30 NOTE — TELEPHONE ENCOUNTER
"Still does not appear patient has read her last MyChart response from me, again called the home/mobile/work (all the same) number in Epic, left message on voicemail identified as \"Geena\" requesting call back to discuss.  Also sent another [employer] response.  Rosmery Marquez RN  [facility]      "

## 2018-04-02 NOTE — TELEPHONE ENCOUNTER
Does not appear patient has read my last 2 [employer] messages.    Has not responded to 3 calls.   Flagged for TC to mail a letter to patient's home requesting call back to clinic.

## 2018-08-02 ENCOUNTER — TELEPHONE (OUTPATIENT)
Dept: FAMILY MEDICINE | Facility: CLINIC | Age: 58
End: 2018-08-02

## 2018-08-02 ENCOUNTER — TRANSFERRED RECORDS (OUTPATIENT)
Dept: HEALTH INFORMATION MANAGEMENT | Facility: CLINIC | Age: 58
End: 2018-08-02

## 2018-08-02 NOTE — TELEPHONE ENCOUNTER
Georgette Pereira is a 57 year old female who calls with high blood pressure.    NURSING ASSESSMENT:  Description:  Takes blood pressure medication but has been out of both tenormin and losartan/hctz for about 3 days. Checks bp at home and today it reads 194/124. Reports bp has been running high for about 3 weeks. She can't get in to see PCP until 8/20/18.  Onset/duration:  3 weeks  Precip. factors:  Out of blood pressure meds  Associated symptoms:  Severe headache 7/10, mild shortness of breath.  DENIES: chest pain/pressure, dizziness/lightheadedness, vision changes, confusion, disorientation.   Improves/worsens symptoms:  n/a  Pain scale (0-10)   7/10    Allergies:   Allergies   Allergen Reactions     Buspirone Nausea     Lisinopril Cough     Vicodin [Hydrocodone-Acetaminophen] Nausea and Vomiting       NURSING PLAN: Nursing advice to patient ER visit    RECOMMENDED DISPOSITION:  To ED, another person to drive - sister can take her to [facility] ER  Will comply with recommendation: Yes  If further questions/concerns or if symptoms do not improve, worsen or new symptoms develop, call your PCP or [facility] as soon as possible.      Guideline used:  Telephone Triage Protocols for Nurses, Fifth Edition, Ratna Morales RN

## 2018-08-14 ENCOUNTER — TRANSFERRED RECORDS (OUTPATIENT)
Dept: HEALTH INFORMATION MANAGEMENT | Facility: CLINIC | Age: 58
End: 2018-08-14

## 2018-08-20 ENCOUNTER — OFFICE VISIT (OUTPATIENT)
Dept: FAMILY MEDICINE | Facility: CLINIC | Age: 58
End: 2018-08-20
Payer: COMMERCIAL

## 2018-08-20 VITALS — BODY MASS INDEX: 23.32 KG/M2 | HEART RATE: 51 BPM | TEMPERATURE: 98.1 F | WEIGHT: 138 LBS | OXYGEN SATURATION: 99 %

## 2018-08-20 DIAGNOSIS — I10 ESSENTIAL HYPERTENSION WITH GOAL BLOOD PRESSURE LESS THAN 140/90: Primary | Chronic | ICD-10-CM

## 2018-08-20 DIAGNOSIS — J44.9 COPD, MODERATE (H): Chronic | ICD-10-CM

## 2018-08-20 DIAGNOSIS — Z11.4 SCREENING FOR HIV (HUMAN IMMUNODEFICIENCY VIRUS): ICD-10-CM

## 2018-08-20 LAB
ALBUMIN UR-MCNC: NEGATIVE MG/DL
ANION GAP SERPL CALCULATED.3IONS-SCNC: 7 MMOL/L (ref 3–14)
APPEARANCE UR: CLEAR
BACTERIA #/AREA URNS HPF: ABNORMAL /HPF
BILIRUB UR QL STRIP: NEGATIVE
BUN SERPL-MCNC: 24 MG/DL (ref 7–30)
CALCIUM SERPL-MCNC: 10 MG/DL (ref 8.5–10.1)
CHLORIDE SERPL-SCNC: 103 MMOL/L (ref 94–109)
CO2 SERPL-SCNC: 30 MMOL/L (ref 20–32)
COLOR UR AUTO: YELLOW
CREAT SERPL-MCNC: 1.37 MG/DL (ref 0.52–1.04)
FEF 25/75: NORMAL
FEV-1: NORMAL
FEV1/FVC: NORMAL
FVC: NORMAL
GFR SERPL CREATININE-BSD FRML MDRD: 40 ML/MIN/1.7M2
GLUCOSE SERPL-MCNC: 95 MG/DL (ref 70–99)
GLUCOSE UR STRIP-MCNC: NEGATIVE MG/DL
HGB UR QL STRIP: ABNORMAL
KETONES UR STRIP-MCNC: NEGATIVE MG/DL
LEUKOCYTE ESTERASE UR QL STRIP: NEGATIVE
NITRATE UR QL: NEGATIVE
NON-SQ EPI CELLS #/AREA URNS LPF: ABNORMAL /LPF
PH UR STRIP: 6.5 PH (ref 5–7)
POTASSIUM SERPL-SCNC: 4.2 MMOL/L (ref 3.4–5.3)
RBC #/AREA URNS AUTO: ABNORMAL /HPF
SODIUM SERPL-SCNC: 140 MMOL/L (ref 133–144)
SOURCE: ABNORMAL
SP GR UR STRIP: 1.01 (ref 1–1.03)
TSH SERPL DL<=0.005 MIU/L-ACNC: 1.12 MU/L (ref 0.4–4)
UROBILINOGEN UR STRIP-ACNC: 0.2 EU/DL (ref 0.2–1)
WBC #/AREA URNS AUTO: ABNORMAL /HPF

## 2018-08-20 PROCEDURE — 84443 ASSAY THYROID STIM HORMONE: CPT | Performed by: INTERNAL MEDICINE

## 2018-08-20 PROCEDURE — 87389 HIV-1 AG W/HIV-1&-2 AB AG IA: CPT | Performed by: INTERNAL MEDICINE

## 2018-08-20 PROCEDURE — 81001 URINALYSIS AUTO W/SCOPE: CPT | Performed by: INTERNAL MEDICINE

## 2018-08-20 PROCEDURE — 36415 COLL VENOUS BLD VENIPUNCTURE: CPT | Performed by: INTERNAL MEDICINE

## 2018-08-20 PROCEDURE — 94010 BREATHING CAPACITY TEST: CPT | Performed by: INTERNAL MEDICINE

## 2018-08-20 PROCEDURE — 99214 OFFICE O/P EST MOD 30 MIN: CPT | Mod: 25 | Performed by: INTERNAL MEDICINE

## 2018-08-20 PROCEDURE — 80048 BASIC METABOLIC PNL TOTAL CA: CPT | Performed by: INTERNAL MEDICINE

## 2018-08-20 RX ORDER — AMLODIPINE BESYLATE 5 MG/1
5 TABLET ORAL DAILY
Qty: 90 TABLET | Refills: 3 | Status: SHIPPED | OUTPATIENT
Start: 2018-08-20 | End: 2018-09-17

## 2018-08-20 NOTE — PROGRESS NOTES
SUBJECTIVE:   Georgette Pereira is a 57 year old female who presents to clinic today for the following health issues:    56 y/o F presented to ER a few times last week with symptomatic accelerated HTN.  EKG nl.    BMP and CBC normal     ED/UC Followup:    Facility:   [facility]  Date of visit: 8/14/18  Reason for visit: Chest pain, unspecified type (Primary Dx);   Hypertension, unspecified   Current Status:        Using NSAIDs, not frequent.  Does not add salt, does not count sodium.   No claudication.     She had been noting BP elevated since February 2017.  Taking prescribed atenolol, checking her home BP and 220/120.     Todays BP is much improved, 150/120.   In they prescribed Losartan/HCT and she stared about a week ago.         Problem list and histories reviewed & adjusted, as indicated.  Additional history: as documented    Patient Active Problem List   Diagnosis     Lung nodule     Anxiety     Fibromyalgia     Knee pain     History of pneumonia, recurrent     Internal hemorrhoids with other complication     COPD, moderate (H)     Adult ADHD     Family history of early CAD     Hyperlipidemia LDL goal <130     Essential hypertension with goal blood pressure less than 140/90     Primary osteoarthritis involving multiple joints     Advanced directives, counseling/discussion     Past Surgical History:   Procedure Laterality Date     C TMJ ARTHROSCOPY/SURGERY  2000     HYSTERECTOMY VAGINAL         Social History   Substance Use Topics     Smoking status: Former Smoker     Packs/day: 1.00     Years: 32.00     Types: Cigarettes, Other     Quit date: 12/31/2012     Smokeless tobacco: Never Used      Comment: using nicotine replacement: e cigarattes     Alcohol use No     Family History   Problem Relation Age of Onset     Breast Cancer Maternal Grandmother      Coronary Artery Disease Maternal Grandmother      MI at 41     Cerebrovascular Disease Mother      Hypertension Mother      HEART DISEASE Mother      Cancer  Mother      Lung     Substance Abuse Mother      Cerebrovascular Disease Father      Hypertension Father      Cancer Father      Metastatic, primary lung/Prostate     Substance Abuse Father      Cancer Other      Substance Abuse Brother      Depression Son      Schizophrenia Brother      Bipolar Disorder Brother      Substance Abuse Brother      Aneurysm Paternal Uncle      Brain Aneurysms         Current Outpatient Prescriptions   Medication Sig Dispense Refill     albuterol (2.5 MG/3ML) 0.083% neb solution Take 1 vial (2.5 mg) by nebulization every 6 hours as needed for shortness of breath / dyspnea or wheezing 60 vial 6     albuterol (PROAIR HFA/PROVENTIL HFA/VENTOLIN HFA) 108 (90 BASE) MCG/ACT Inhaler Inhale 2 puffs into the lungs every 6 hours as needed for shortness of breath / dyspnea or wheezing 1 Inhaler 1     atenolol (TENORMIN) 100 MG tablet Take 1 tablet (100 mg) by mouth daily 30 tablet 0     losartan-hydrochlorothiazide (HYZAAR) 100-25 MG per tablet TAKE 1 TABLET BY MOUTH DAILY 30 tablet 0     mometasone-formoterol (DULERA) 200-5 MCG/ACT oral inhaler Inhale 2 puffs into the lungs 2 times daily       ibuprofen (ADVIL,MOTRIN) 200 MG tablet Take 800 mg by mouth every 4 hours as needed for pain Reported on 2/20/2017 100 tablet 3     lidocaine (LIDODERM) 5 % Patch Apply up to 3 patches to painful area at once for up to 12 h within a 24 h period.  Remove after 12 hours. (Patient not taking: Reported on 8/20/2018) 30 patch 1     lidocaine (XYLOCAINE) 2 % topical gel Apply topically 2 times daily as needed for moderate pain (Patient not taking: Reported on 8/20/2018) 30 mL 3     losartan-hydrochlorothiazide (HYZAAR) 100-25 MG per tablet Take 1 tablet by mouth daily OVERDUE FOR LABS.   PLEASE SCHEDULE LAB PRIOR TO NEXT REQUEST.  LIMITED SUPPLY GIVEN (Patient not taking: Reported on 8/20/2018) 15 tablet 0     Sodium Fluoride (SF 5000 PLUS) 1.1 % CREA Use for oral hygiene twice daily (Patient not taking: Reported  on 8/20/2018) 57 g 12     tiotropium (SPIRIVA) 18 MCG capsule Inhale 1 capsule (18 mcg) into the lungs daily (Patient not taking: Reported on 8/20/2018) 90 capsule 3     Allergies   Allergen Reactions     Buspirone Nausea     Lisinopril Cough     Vicodin [Hydrocodone-Acetaminophen] Nausea and Vomiting     Recent Labs   Lab Test  08/12/16   0851  03/02/16   1217  08/13/15   1034   LDL  55  51  197*   HDL  50  56  62   TRIG  202*  198*  278*   ALT   --   19   --    CR  0.89  0.91   --    GFRESTIMATED  66  64   --    GFRESTBLACK  80  77   --    POTASSIUM  3.7  4.6   --    TSH   --   0.58  1.84      BP Readings from Last 3 Encounters:   11/28/17 133/89   05/16/17 122/76   04/04/17 (!) 137/91    Wt Readings from Last 3 Encounters:   08/20/18 138 lb (62.6 kg)   11/28/17 138 lb (62.6 kg)   05/16/17 145 lb (65.8 kg)                  Labs reviewed in EPIC    Reviewed and updated as needed this visit by clinical staff  Tobacco  Allergies  Meds  Med Hx  Surg Hx  Fam Hx  Soc Hx      Reviewed and updated as needed this visit by Provider         ROS:  Constitutional, HEENT, cardiovascular, pulmonary, gi and gu systems are negative, except as otherwise noted.    OBJECTIVE:     Pulse 51  Temp 98.1  F (36.7  C) (Oral)  Wt 138 lb (62.6 kg)  SpO2 99%  BMI 23.32 kg/m2  02sat 99% with walking, HR stayed at 51-53 bpm  Body mass index is 23.32 kg/(m^2).  GENERAL: healthy, alert and no distress  NECK: no adenopathy, no asymmetry, masses, or scars and thyroid normal to palpation  RESP: lungs clear to auscultation - no rales, rhonchi or wheezes  CV: regular rate and rhythm, normal S1 S2, no S3 or S4, no murmur, click or rub, no peripheral edema and peripheral pulses strong  CV: bradycardia  MS: no gross musculoskeletal defects noted, no edema  PSYCH: mentation appears normal, affect normal/appropriate    Diagnostic Test Results:  TSH  pending  CBC - normal in ED  WBC - normal in ED  Hgb - normal in ED  Urinalysis -  pending    ASSESSMENT/PLAN:             ICD-10-CM    1. Essential hypertension with goal blood pressure less than 140/90 I10 UA with Microscopic reflex to Culture     TSH with free T4 reflex     amLODIPine (NORVASC) 5 MG tablet     Basic metabolic panel   2. COPD, moderate (H) J44.9 Spirometry, Breathing Capacity: Normal Order, Clinic Performed   3. 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  Will be working at [employer], very busy next couple of weeks.     Today's spirometry shows moderate COPD.     At next visit, if her BP is more controlled, may work on decreasing atenolol as the high dose could contribute to her SOB.... Starting amlodipine, consider increase to bid at upcoming RN visit. . .    She is coached to limit sodium intake, she remarks that she eats a lot of chips .        declined to refill valium and T#3.     Yue Bermudez MD  [facility]

## 2018-08-20 NOTE — MR AVS SNAPSHOT
After Visit Summary   8/20/2018    Georgette Pereira    MRN: [id number]           Patient Information     Date Of Birth          1960        Visit Information        Provider Department      8/20/2018 2:00 PM Yue Bermudez MD [facility]        Today's Diagnoses     Essential hypertension with goal blood pressure less than 140/90    -  1    COPD, moderate (H)        Screening for HIV (human immunodeficiency virus)          Care Instructions    Start amlodipine 5 mg daily    Recheck BP about a week with RN (Jeannette)    Count sodium:  < 1800 MG per day    Return to clinic 2 - 3 weeks.               Follow-ups after your visit        Who to contact     If you have questions or need follow up information about today's clinic visit or your schedule please contact [facility] directly at [phone].  Normal or non-critical lab and imaging results will be communicated to you by MyChart, letter or phone within 4 business days after the clinic has received the results. If you do not hear from us within 7 days, please contact the clinic through MyChart or phone. If you have a critical or abnormal lab result, we will notify you by phone as soon as possible.  Submit refill requests through [employer] or call your pharmacy and they will forward the refill request to us. Please allow 3 business days for your refill to be completed.          Additional Information About Your Visit        MyChart Information     [employer] gives you secure access to your electronic health record. If you see a primary care provider, you can also send messages to your care team and make appointments. If you have questions, please call your primary care clinic.  If you do not have a primary care provider, please call [phone] and they will assist you.        Care EveryWhere ID     This is your Care EveryWhere ID. This could be used by other organizations to access your [facility]  medical records  [id number]        Your Vitals Were     Pulse Temperature Pulse Oximetry BMI (Body Mass Index)          51 98.1  F (36.7  C) (Oral) 99% 23.32 kg/m2         Blood Pressure from Last 3 Encounters:   11/28/17 133/89   05/16/17 122/76   04/04/17 (!) 137/91    Weight from Last 3 Encounters:   08/20/18 138 lb (62.6 kg)   11/28/17 138 lb (62.6 kg)   05/16/17 145 lb (65.8 kg)              We Performed the Following     Basic metabolic panel     HIV Antigen Antibody Combo     TSH with free T4 reflex     UA with Microscopic reflex to Culture          Today's Medication Changes          These changes are accurate as of 8/20/18  2:27 PM.  If you have any questions, ask your nurse or doctor.               Start taking these medicines.        Dose/Directions    amLODIPine 5 MG tablet   Commonly known as:  NORVASC   Used for:  Essential hypertension with goal blood pressure less than 140/90   Started by:  Yue Bermudez MD        Dose:  5 mg   Take 1 tablet (5 mg) by mouth daily   Quantity:  90 tablet   Refills:  3         Stop taking these medicines if you haven't already. Please contact your care team if you have questions.     budesonide-formoterol 160-4.5 MCG/ACT Inhaler   Commonly known as:  SYMBICORT   Stopped by:  Yue Bermudez MD           diazepam 10 MG tablet   Commonly known as:  VALIUM   Stopped by:  Yue Bermudez MD           DULoxetine 60 MG EC capsule   Commonly known as:  CYMBALTA   Stopped by:  Yeu Bermudez MD           estradiol 0.1 MG/GM cream   Commonly known as:  ESTRACE VAGINAL   Stopped by:  Yue Bermudez MD           fluticasone-salmeterol 250-50 MCG/DOSE diskus inhaler   Commonly known as:  ADVAIR   Stopped by:  Yue Bermudez MD                Where to get your medicines      These medications were sent to [facility] - [place], [place] - [address] [address]  [address] [facility]  [address]    Hours:  24-hours Phone:  [phone]     amLODIPine 5 MG tablet                Primary Care Provider Office Phone # Fax #    Yue Bermudez [phone] [address] [facility] [address]        Equal Access to Services     ALINE MEJIA : Hadii aad ku hadasho Soomaali, waaxda luqadaha, qaybta kaalmada adeegyada, waxay dalein samanthan adepatel clifford harjit sanchez. So [facility] [phone].    ATENCIÓN: Si habla español, tiene a manuel disposición servicios gratuitos de asistencia lingüística. Llame al [phone].    We comply with applicable federal civil rights laws and [place] laws. We do not discriminate on the basis of race, color, national origin, age, disability, sex, sexual orientation, or gender identity.            Thank you!     Thank you for choosing [facility]  for your care. Our goal is always to provide you with excellent care. Hearing back from our patients is one way we can continue to improve our services. Please take a few minutes to complete the written survey that you may receive in the mail after your visit with us. Thank you!             Your Updated Medication List - Protect others around you: Learn how to safely use, store and throw away your medicines at www.disposemymeds.org.          This list is accurate as of 8/20/18  2:27 PM.  Always use your most recent med list.                   Brand Name Dispense Instructions for use Diagnosis    * albuterol (2.5 MG/3ML) 0.083% neb solution     60 vial    Take 1 vial (2.5 mg) by nebulization every 6 hours as needed for shortness of breath / dyspnea or wheezing    COPD, moderate (H)       * albuterol 108 (90 Base) MCG/ACT inhaler    PROAIR HFA/PROVENTIL HFA/VENTOLIN HFA    1 Inhaler    Inhale 2 puffs into the lungs every 6 hours as needed for shortness of breath / dyspnea or wheezing    COPD, moderate (H)       amLODIPine 5 MG tablet    NORVASC    90 tablet    Take 1 tablet (5 mg) by  mouth daily    Essential hypertension with goal blood pressure less than 140/90       atenolol 100 MG tablet    TENORMIN    30 tablet    Take 1 tablet (100 mg) by mouth daily    Essential hypertension with goal blood pressure less than 140/90       ibuprofen 200 MG tablet    ADVIL/MOTRIN    100 tablet    Take 800 mg by mouth every 4 hours as needed for pain Reported on 2/20/2017    Neck pain       lidocaine 2 % topical gel    XYLOCAINE    30 mL    Apply topically 2 times daily as needed for moderate pain    Fibromyalgia       lidocaine 5 % Patch    LIDODERM    30 patch    Apply up to 3 patches to painful area at once for up to 12 h within a 24 h period.  Remove after 12 hours.    Fibromyalgia       * losartan-hydrochlorothiazide 100-25 MG per tablet    HYZAAR    15 tablet    Take 1 tablet by mouth daily OVERDUE FOR LABS.   PLEASE SCHEDULE LAB PRIOR TO NEXT REQUEST.  LIMITED SUPPLY GIVEN    Essential hypertension with goal blood pressure less than 140/90       * losartan-hydrochlorothiazide 100-25 MG per tablet    HYZAAR    30 tablet    TAKE 1 TABLET BY MOUTH DAILY    Essential hypertension with goal blood pressure less than 140/90       mometasone-formoterol 200-5 MCG/ACT oral inhaler    DULERA     Inhale 2 puffs into the lungs 2 times daily        Sodium Fluoride 1.1 % Crea    SF 5000 PLUS    57 g    Use for oral hygiene twice daily    Gingivitis, chronic       tiotropium 18 MCG capsule    SPIRIVA    90 capsule    Inhale 1 capsule (18 mcg) into the lungs daily    COPD, moderate (H)       * Notice:  This list has 4 medication(s) that are the same as other medications prescribed for you. Read the directions carefully, and ask your doctor or other care provider to review them with you.

## 2018-08-20 NOTE — PATIENT INSTRUCTIONS
Start amlodipine 5 mg daily    Recheck BP about a week with RN (Jeannette)    Count sodium:  < 1800 MG per day    Return to clinic 2 - 3 weeks.

## 2018-08-21 LAB — HIV 1+2 AB+HIV1 P24 AG SERPL QL IA: NONREACTIVE

## 2018-08-24 NOTE — PROGRESS NOTES
Georgette Pereira    The kidney function (Creatinine) is a little bit diminished.    This could really be from dehydration.  Push fluids and we can repeat this at a later date.     Sincerely,     MENDEZ RUFFIN M.D.

## 2018-09-12 ENCOUNTER — TELEPHONE (OUTPATIENT)
Dept: FAMILY MEDICINE | Facility: CLINIC | Age: 58
End: 2018-09-12

## 2018-09-12 NOTE — TELEPHONE ENCOUNTER
Panel Management Review      Patient has the following on her problem list:     Hypertension   Last three blood pressure readings:  BP Readings from Last 3 Encounters:   11/28/17 133/89   05/16/17 122/76   04/04/17 (!) 137/91     Blood pressure: Passed    HTN Guidelines:  Age 18-59 BP range:  Less than 140/90  Age 60-85 with Diabetes:  Less than 140/90  Age 60-85 without Diabetes:  less than 150/90      Composite cancer screening  Chart review shows that this patient is due/due soon for the following None  Summary:    Patient is due/failing the following:   MAMMOGRAM    Action needed:   Due for a mammogram     Type of outreach:    Sent letter.    Questions for provider review:    None                                                                                                                                    Lisa Perdue ma       Chart routed to Care Team .

## 2018-09-12 NOTE — LETTER
September 12, 2018    Georgette Pereira  [address]  [place] [address]    Dear Georgette    We care about your health and have reviewed your health plan. We have reviewed your medical conditions, medication list, and lab results and are making recommendations based on this review, to better manage your health.    You are in particular need of attention regarding:  - Scheduling a Breast Cancer Screening (Mammography) [phone]      Here is a list of Health Maintenance topics that are due now or due soon:  Health Maintenance Due   Topic Date Due     COPD ACTION PLAN Q1 YR  1960     URINE DRUG SCREEN Q1 YR  10/11/1975     MAMMO SCREEN Q2 YR (SYSTEM ASSIGNED)  08/13/2017     INFLUENZA VACCINE (1) 09/01/2018     We will be calling you in the next couple of weeks to help you schedule any appointments that are needed.  Please call us at [phone] (or use [employer]) to address the above recommendations.     Thank you for trusting [facility] and we appreciate the opportunity to serve you.  We look forward to supporting your healthcare needs in the future.    Healthy Regards,    Dr. Bermudez/adis

## 2018-09-14 ENCOUNTER — MYC MEDICAL ADVICE (OUTPATIENT)
Dept: FAMILY MEDICINE | Facility: CLINIC | Age: 58
End: 2018-09-14

## 2018-09-14 DIAGNOSIS — I10 ESSENTIAL HYPERTENSION WITH GOAL BLOOD PRESSURE LESS THAN 140/90: Chronic | ICD-10-CM

## 2018-09-17 RX ORDER — ATENOLOL 100 MG/1
100 TABLET ORAL DAILY
Qty: 90 TABLET | Refills: 1 | Status: SHIPPED | OUTPATIENT
Start: 2018-09-17 | End: 2019-03-21

## 2018-09-17 RX ORDER — AMLODIPINE BESYLATE 5 MG/1
5 TABLET ORAL DAILY
Qty: 90 TABLET | Refills: 1 | Status: SHIPPED | OUTPATIENT
Start: 2018-09-17 | End: 2019-09-20

## 2018-09-26 ENCOUNTER — MYC MEDICAL ADVICE (OUTPATIENT)
Dept: FAMILY MEDICINE | Facility: CLINIC | Age: 58
End: 2018-09-26

## 2018-09-26 DIAGNOSIS — J44.9 COPD, MODERATE (H): Primary | Chronic | ICD-10-CM

## 2018-09-26 DIAGNOSIS — Z12.31 ENCOUNTER FOR SCREENING MAMMOGRAM FOR BREAST CANCER: ICD-10-CM

## 2018-10-01 ENCOUNTER — TELEPHONE (OUTPATIENT)
Dept: FAMILY MEDICINE | Facility: CLINIC | Age: 58
End: 2018-10-01

## 2018-10-01 NOTE — TELEPHONE ENCOUNTER
10/1/2018    Call Regarding Onboarding ARE CHOICES    Attempt 1    Message on voicemail     Comments:           Outreach   AT

## 2018-10-18 DIAGNOSIS — I10 ESSENTIAL HYPERTENSION WITH GOAL BLOOD PRESSURE LESS THAN 140/90: Chronic | ICD-10-CM

## 2018-10-18 NOTE — TELEPHONE ENCOUNTER
"Requested Prescriptions   Pending Prescriptions Disp Refills     losartan-hydrochlorothiazide (HYZAAR) 100-25 MG per tablet [Pharmacy Med Name: LOSARTAN/HCTZ 100/25MG TABLETS] 30 tablet 0     Sig: TAKE 1 TABLET BY MOUTH DAILY    Last Written Prescription Date:  03/16/18  Last Fill Quantity: 30,  # refills: 0   Last office visit: 8/20/2018 with prescribing provider:     Future Office Visit:        Angiotensin-II Receptors Failed    10/18/2018 11:20 AM       Failed - Normal serum creatinine on file in past 12 months    Recent Labs   Lab Test  08/20/18   1432   CR  1.37*            Passed - Blood pressure under 140/90 in past 12 months    BP Readings from Last 3 Encounters:   11/28/17 133/89   05/16/17 122/76   04/04/17 (!) 137/91                Passed - Recent (12 mo) or future (30 days) visit within the authorizing provider's specialty    Patient had office visit in the last 12 months or has a visit in the next 30 days with authorizing provider or within the authorizing provider's specialty.  See \"Patient Info\" tab in inbasket, or \"Choose Columns\" in Meds & Orders section of the refill encounter.             Passed - Patient is age 18 or older       Passed - No active pregnancy on record       Passed - Normal serum potassium on file in past 12 months    Recent Labs   Lab Test  08/20/18   1432   POTASSIUM  4.2                   Passed - No positive pregnancy test in past 12 months          "

## 2018-10-19 RX ORDER — LOSARTAN POTASSIUM AND HYDROCHLOROTHIAZIDE 25; 100 MG/1; MG/1
TABLET ORAL
Qty: 30 TABLET | Refills: 0 | Status: SHIPPED | OUTPATIENT
Start: 2018-10-19 | End: 2018-11-17

## 2018-10-19 NOTE — TELEPHONE ENCOUNTER
She did not follow up in 2-3 weeks after the 8/20/18 visit as directed.    Nothing yet scheduled.    Routing refill request to provider for review/approval because:  Labs out of range:  Creatinine    Rosmery Marquez RN  [facility]

## 2018-10-26 ENCOUNTER — MYC MEDICAL ADVICE (OUTPATIENT)
Dept: FAMILY MEDICINE | Facility: CLINIC | Age: 58
End: 2018-10-26

## 2018-10-26 DIAGNOSIS — F41.9 ANXIETY: ICD-10-CM

## 2018-10-26 RX ORDER — DIAZEPAM 10 MG
10 TABLET ORAL DAILY PRN
Qty: 30 TABLET | Refills: 0 | Status: SHIPPED | OUTPATIENT
Start: 2018-10-26 | End: 2021-06-01

## 2018-11-17 DIAGNOSIS — I10 ESSENTIAL HYPERTENSION WITH GOAL BLOOD PRESSURE LESS THAN 140/90: Chronic | ICD-10-CM

## 2018-11-19 RX ORDER — LOSARTAN POTASSIUM AND HYDROCHLOROTHIAZIDE 25; 100 MG/1; MG/1
TABLET ORAL
Qty: 30 TABLET | Refills: 0 | Status: SHIPPED | OUTPATIENT
Start: 2018-11-19 | End: 2018-12-22

## 2018-11-21 NOTE — TELEPHONE ENCOUNTER
11/21/2018    Call Regarding Onboarding P1 Other    Attempt 2    Message on voicemail     Comments:       Outreach   CWR

## 2018-12-22 ENCOUNTER — TELEPHONE (OUTPATIENT)
Dept: FAMILY MEDICINE | Facility: CLINIC | Age: 58
End: 2018-12-22

## 2018-12-22 DIAGNOSIS — I10 ESSENTIAL HYPERTENSION WITH GOAL BLOOD PRESSURE LESS THAN 140/90: Chronic | ICD-10-CM

## 2018-12-24 RX ORDER — LOSARTAN POTASSIUM AND HYDROCHLOROTHIAZIDE 25; 100 MG/1; MG/1
TABLET ORAL
Qty: 30 TABLET | Refills: 0 | Status: SHIPPED | OUTPATIENT
Start: 2018-12-24 | End: 2019-03-06

## 2018-12-24 NOTE — TELEPHONE ENCOUNTER
Routing refill request to provider for review/approval because:  Labs out of range:  Creatinine, blood pressure    Josiane Martinez RN

## 2018-12-24 NOTE — TELEPHONE ENCOUNTER
"Requested Prescriptions   Pending Prescriptions Disp Refills     losartan-hydrochlorothiazide (HYZAAR) 100-25 MG tablet [Pharmacy Med Name: LOSARTAN/HCTZ 100/25MG TABLETS] 30 tablet 0    Last Written Prescription Date:  11/19/18  Last Fill Quantity: 30,  # refills: 0   Last office visit: 8/20/2018 with prescribing provider:     Future Office Visit:     Sig: TAKE 1 TABLET BY MOUTH EVERY DAY    Angiotensin-II Receptors Failed - 12/22/2018  6:56 AM       Failed - Blood pressure under 140/90 in past 12 months    BP Readings from Last 3 Encounters:   11/28/17 133/89   05/16/17 122/76   04/04/17 (!) 137/91                Failed - Normal serum creatinine on file in past 12 months    Recent Labs   Lab Test 08/20/18  1432   CR 1.37*            Passed - Recent (12 mo) or future (30 days) visit within the authorizing provider's specialty    Patient had office visit in the last 12 months or has a visit in the next 30 days with authorizing provider or within the authorizing provider's specialty.  See \"Patient Info\" tab in inbasket, or \"Choose Columns\" in Meds & Orders section of the refill encounter.             Passed - Patient is age 18 or older       Passed - No active pregnancy on record       Passed - Normal serum potassium on file in past 12 months    Recent Labs   Lab Test 08/20/18  1432   POTASSIUM 4.2                   Passed - No positive pregnancy test in past 12 months          "

## 2018-12-24 NOTE — TELEPHONE ENCOUNTER
Called patient at [phone] . Left detailed message on her personal voicemail relaying the message below and to return call to nurse triage line at [phone] with any questions or call [phone] to schedule a lab appointment.    Josiane Martinez RN

## 2018-12-24 NOTE — TELEPHONE ENCOUNTER
Patient should come in for repeat BMP. Ordered. Should make sure to be well hydrated.   Adri Polk PA-C

## 2019-01-08 ENCOUNTER — TELEPHONE (OUTPATIENT)
Dept: FAMILY MEDICINE | Facility: CLINIC | Age: 59
End: 2019-01-08

## 2019-01-08 NOTE — LETTER
January 8, 2019    Georgette Pereira  [address]  [place] [address]    Dear Georgette    We care about your health and have reviewed your health plan. We have reviewed your medical conditions, medication list, and lab results and are making recommendations based on this review, to better manage your health.    You are in particular need of attention regarding:  - Scheduling a Breast Cancer Screening (Mammography) [phone]      Here is a list of Health Maintenance topics that are due now or due soon:  Health Maintenance Due   Topic Date Due     COPD ACTION PLAN Q1 YR  1960     URINE DRUG SCREEN Q1 YR  10/11/1975     ZOSTER IMMUNIZATION (1 of 2) 10/11/2010     MAMMO SCREEN Q2 YR (SYSTEM ASSIGNED)  08/13/2017     INFLUENZA VACCINE (1) 09/01/2018     We will be calling you in the next couple of weeks to help you schedule any appointments that are needed.  Please call us at [phone] (or use [employer]) to address the above recommendations.     Thank you for trusting [facility] and we appreciate the opportunity to serve you.  We look forward to supporting your healthcare needs in the future.    Healthy Regards,    Dr. Bermudez/adis

## 2019-03-05 ENCOUNTER — MYC MEDICAL ADVICE (OUTPATIENT)
Dept: FAMILY MEDICINE | Facility: CLINIC | Age: 59
End: 2019-03-05

## 2019-03-05 DIAGNOSIS — F41.9 ANXIETY: Primary | Chronic | ICD-10-CM

## 2019-03-06 DIAGNOSIS — I10 ESSENTIAL HYPERTENSION WITH GOAL BLOOD PRESSURE LESS THAN 140/90: Chronic | ICD-10-CM

## 2019-03-06 NOTE — TELEPHONE ENCOUNTER
Reason for Call:  Medication or medication refill:    Do you use a [facility]?  Name of the pharmacy and phone number for the current request:  [facility]    Name of the medication requested: losartan-hydrochlorothiazide (HYZAAR) 100-25 MG tablet    Other request: Patient has office visit scheduled on 03/28 but needs medication prior to appointment.    Can we leave a detailed message on this number? YES    Phone number patient can be reached at: Home number on file [phone] (home)    Best Time: any    Call taken on 3/6/2019 at 9:41 AM by Merlene Flower

## 2019-03-06 NOTE — TELEPHONE ENCOUNTER
"Requested Prescriptions   Pending Prescriptions Disp Refills     losartan-hydrochlorothiazide (HYZAAR) 100-25 MG tablet 30 tablet 0    Last Written Prescription Date:  12-24-18  Last Fill Quantity: 30,  # refills: 0   Last office visit: 8/20/2018 with prescribing provider:  8-20-18   Future Office Visit:   Next 5 appointments (look out 90 days)    Mar 28, 2019  4:40 PM CDT  Office Visit with Yue Bermudez MD  [facility] [address] [place] [phone] [phone]          Sig: Take 1 tablet by mouth daily    Angiotensin-II Receptors Failed - 3/6/2019  9:42 AM       Failed - Blood pressure under 140/90 in past 12 months    BP Readings from Last 3 Encounters:   11/28/17 133/89   05/16/17 122/76   04/04/17 (!) 137/91                Failed - Normal serum creatinine on file in past 12 months    Recent Labs   Lab Test 08/20/18  1432   CR 1.37*            Passed - Recent (12 mo) or future (30 days) visit within the authorizing provider's specialty    Patient had office visit in the last 12 months or has a visit in the next 30 days with authorizing provider or within the authorizing provider's specialty.  See \"Patient Info\" tab in inbasket, or \"Choose Columns\" in Meds & Orders section of the refill encounter.             Passed - Medication is active on med list       Passed - Patient is age 18 or older       Passed - No active pregnancy on record       Passed - Normal serum potassium on file in past 12 months    Recent Labs   Lab Test 08/20/18  1432   POTASSIUM 4.2                   Passed - No positive pregnancy test in past 12 months          "

## 2019-03-06 NOTE — TELEPHONE ENCOUNTER
TC spoke with the scheduling staff at  who states their mental health provider only sees established [facility]  patients at this time. The other provider they have is not accepting new patients. TC spoke with mental health scheduling who states that PCP just needs to place a mental health referral. Patient did not realized that we have a provider here at [facility] that she can see. Patient would like a referral placed to see Javier here at [facility]. Please route to TC to assist with scheduling once referral has been placed. Patient is available this week between 9:30-10:30AM and 1:00-2:45PM via phone.

## 2019-03-07 RX ORDER — LOSARTAN POTASSIUM AND HYDROCHLOROTHIAZIDE 25; 100 MG/1; MG/1
1 TABLET ORAL DAILY
Qty: 90 TABLET | Refills: 0 | Status: SHIPPED | OUTPATIENT
Start: 2019-03-07 | End: 2019-06-14

## 2019-03-07 NOTE — TELEPHONE ENCOUNTER
Routing refill request to provider for review/approval because:  Labs out of range:  Creatinine   Failed protocol: blood pressure    Josiane Martinez RN

## 2019-03-07 NOTE — TELEPHONE ENCOUNTER
TC contacted patient and notified her that referral has been placed. Scheduling number provided to patient.

## 2019-03-21 DIAGNOSIS — I10 ESSENTIAL HYPERTENSION WITH GOAL BLOOD PRESSURE LESS THAN 140/90: Chronic | ICD-10-CM

## 2019-03-21 RX ORDER — ATENOLOL 100 MG/1
TABLET ORAL
Qty: 90 TABLET | Refills: 1 | Status: SHIPPED | OUTPATIENT
Start: 2019-03-21 | End: 2019-03-28 | Stop reason: ALTCHOICE

## 2019-03-21 NOTE — TELEPHONE ENCOUNTER
"Requested Prescriptions   Pending Prescriptions Disp Refills     atenolol (TENORMIN) 100 MG tablet [Pharmacy Med Name: ATENOLOL 100MG TABLETS] 90 tablet 0    Last Written Prescription Date:  9/17/18  Last Fill Quantity: 90,  # refills: 1   Last office visit: 8/20/2018 with prescribing provider:     Future Office Visit:   Next 5 appointments (look out 90 days)    Mar 28, 2019  4:40 PM CDT  Office Visit with Yue B[facility]z MD  [facility] [address] [address]  [phone]   Apr 10, 2019  5:00 PM CDT  Return Visit with GEMMA Lara  [facility] [address] [address]  [phone]   Jun 18, 2019 10:00 AM CDT  Office Visit with PFT LAB  [facility] ([facility]) [address] [address] [place] [phone]          Sig: TAKE 1 TABLET(100 MG) BY MOUTH DAILY    Beta-Blockers Protocol Failed - 3/21/2019  1:33 PM       Failed - Blood pressure under 140/90 in past 12 months    BP Readings from Last 3 Encounters:   11/28/17 133/89   05/16/17 122/76   04/04/17 (!) 137/91                Passed - Patient is age 6 or older       Passed - Recent (12 mo) or future (30 days) visit within the authorizing provider's specialty    Patient had office visit in the last 12 months or has a visit in the next 30 days with authorizing provider or within the authorizing provider's specialty.  See \"Patient Info\" tab in inbasket, or \"Choose Columns\" in Meds & Orders section of the refill encounter.             Passed - Medication is active on med list          "

## 2019-03-28 ENCOUNTER — OFFICE VISIT (OUTPATIENT)
Dept: FAMILY MEDICINE | Facility: CLINIC | Age: 59
End: 2019-03-28
Payer: COMMERCIAL

## 2019-03-28 VITALS
WEIGHT: 143 LBS | HEIGHT: 64 IN | SYSTOLIC BLOOD PRESSURE: 161 MMHG | TEMPERATURE: 98 F | HEART RATE: 63 BPM | OXYGEN SATURATION: 100 % | BODY MASS INDEX: 24.41 KG/M2 | DIASTOLIC BLOOD PRESSURE: 101 MMHG

## 2019-03-28 DIAGNOSIS — J44.9 COPD, MODERATE (H): Chronic | ICD-10-CM

## 2019-03-28 DIAGNOSIS — I10 ESSENTIAL HYPERTENSION WITH GOAL BLOOD PRESSURE LESS THAN 140/90: Primary | Chronic | ICD-10-CM

## 2019-03-28 DIAGNOSIS — N95.2 ATROPHIC VAGINITIS: ICD-10-CM

## 2019-03-28 DIAGNOSIS — Z79.1 NSAID LONG-TERM USE: ICD-10-CM

## 2019-03-28 DIAGNOSIS — Z12.31 VISIT FOR SCREENING MAMMOGRAM: ICD-10-CM

## 2019-03-28 DIAGNOSIS — B37.9 CANDIDA INFECTION: ICD-10-CM

## 2019-03-28 PROCEDURE — 36415 COLL VENOUS BLD VENIPUNCTURE: CPT | Performed by: INTERNAL MEDICINE

## 2019-03-28 PROCEDURE — 80048 BASIC METABOLIC PNL TOTAL CA: CPT | Performed by: INTERNAL MEDICINE

## 2019-03-28 PROCEDURE — 99214 OFFICE O/P EST MOD 30 MIN: CPT | Performed by: INTERNAL MEDICINE

## 2019-03-28 RX ORDER — METOPROLOL SUCCINATE 100 MG/1
100 TABLET, EXTENDED RELEASE ORAL DAILY
Qty: 90 TABLET | Refills: 3 | Status: SHIPPED | OUTPATIENT
Start: 2019-03-28 | End: 2020-04-13

## 2019-03-28 RX ORDER — TRIAMCINOLONE ACETONIDE 1 MG/G
CREAM TOPICAL 2 TIMES DAILY
Qty: 80 G | Refills: 1 | Status: SHIPPED | OUTPATIENT
Start: 2019-03-28 | End: 2021-06-01

## 2019-03-28 RX ORDER — NYSTATIN 100000 U/G
CREAM TOPICAL 2 TIMES DAILY
Qty: 30 G | Refills: 1 | Status: SHIPPED | OUTPATIENT
Start: 2019-03-28 | End: 2021-06-01

## 2019-03-28 ASSESSMENT — MIFFLIN-ST. JEOR: SCORE: 1217.61

## 2019-03-28 ASSESSMENT — PATIENT HEALTH QUESTIONNAIRE - PHQ9: SUM OF ALL RESPONSES TO PHQ QUESTIONS 1-9: 4

## 2019-03-28 NOTE — LETTER
My COPD Action Plan     Name: Georgette Pereira    YOB: 1960   Date: 3/28/2019    My doctor: Yue Bermudez MD   My clinic: [address] [address] [phone]  My Controller Medicine: Tiotropium (Spiriva)  Formoterol/mometasone (Dulera)   Dose:       My Rescue Medicine: Albuterol (Proair/Ventolin/Proventil) inhaler   Dose:       My Flare Up Medicine: call clinic   Dose:       My COPD Severity: Moderate = FeV1 < 79% -50%      Use of Oxygen: Oxygen Not Prescribed      Make sure you've had your pneumonia   vaccines.          GREEN ZONE       Doing well today      Usual level of activity and exercise    Usual amount of cough and mucus    No shortness of breath    Usual level of health (thinking clearly, sleeping well, feel like eating) Actions:      Take daily medicines    Use oxygen as prescribed    Follow regular exercise and diet plan    Avoid cigarette smoke and other irritants that harm the lungs           YELLOW ZONE          Having a bad day or flare up      Short of breath more than usual    A lot more sputum (mucus) than usual    Sputum looks yellow, green, tan, brown or bloody    More coughing or wheezing    Fever or chills    Less energy; trouble completing activities    Trouble thinking or focusing    Using quick relief inhaler or nebulizer more often    Poor sleep; symptoms wake me up    Do not feel like eating Actions:      Get plenty of rest    Take daily medicines    Use quick relief inhaler every 4 hours    If you use oxygen, call you doctor to see if you should adjust your oxygen    Do breathing exercises or other things to help you relax    Let a loved one, friend or neighbor know you are feeling worse    Call your care team if you have 2 or more symptoms.  Start taking steroids or antibiotics if directed by your care team           RED ZONE       Need medical care now      Severe shortness of  breath (feel you can't breathe)    Fever, chills    Not enough breath to do any activity    Trouble coughing up mucus, walking or talking    Blood in mucus    Frequent coughing   Rescue medicines are not working    Not able to sleep because of breathing    Feel confused or drowsy    Chest pain    Actions:      Call your health care team.  If you cannot reach your care team, call 911 or go to the emergency room.        Annual Reminders:  Meet with Care Team, Flu Shot every Fall  Pharmacy:    [id number] - [place] - [address] [address]  WRITTEN PRESCRIPTION REQUESTED

## 2019-03-28 NOTE — PROGRESS NOTES
SUBJECTIVE:   Georgette Pereira is a 58 year old female who presents to clinic today for the following health issues:    59 y/o here for f/u HTN.  Presented to ER last summer w/accelerated HTN.  Also h/o moderate COPD, djd knees.   creatinine a bit elevated.  increased Fluid intake encouraged, recheck BMP next time I see her. .. Mother  in October (Yulissa Lynn).        Hypertension Follow-up      Outpatient blood pressures are being checked at home.  Results are 160's.    Low Salt Diet: low salt      Amount of exercise or physical activity: None    Problems taking medications regularly: No    Medication side effects: none    Diet: low salt      Vaginal Symptoms      Duration: 9 months     Description  itching    Intensity:  severe    Accompanying signs and symptoms (fever/dysuria/abdominal or back pain): None    History  Sexually active: not at present  Possibility of pregnancy: No  Recent antibiotic use: no     Precipitating or alleviating factors: None    Therapies tried and outcome: none              Problem list and histories reviewed & adjusted, as indicated.  Additional history: as documented    Patient Active Problem List   Diagnosis     Lung nodule     Anxiety     Fibromyalgia     Knee pain     History of pneumonia, recurrent     Internal hemorrhoids with other complication     COPD, moderate (H)     Adult ADHD     Family history of early CAD     Hyperlipidemia LDL goal <130     Essential hypertension with goal blood pressure less than 140/90     Primary osteoarthritis involving multiple joints     Advanced directives, counseling/discussion     Past Surgical History:   Procedure Laterality Date     C TMJ ARTHROSCOPY/SURGERY       HYSTERECTOMY VAGINAL         Social History     Tobacco Use     Smoking status: Former Smoker     Packs/day: 1.00     Years: 32.00     Pack years: 32.00     Types: Cigarettes, Other     Last attempt to quit: 2012     Years since quittin.2     Smokeless  tobacco: Never Used     Tobacco comment: using nicotine replacement: e cigarattes   Substance Use Topics     Alcohol use: No     Alcohol/week: 0.0 oz     Family History   Problem Relation Age of Onset     Breast Cancer Maternal Grandmother      Coronary Artery Disease Maternal Grandmother         MI at 41     Cerebrovascular Disease Mother      Hypertension Mother      Heart Disease Mother      Cancer Mother         Lung     Substance Abuse Mother      Cerebrovascular Disease Father      Hypertension Father      Cancer Father         Metastatic, primary lung/Prostate     Substance Abuse Father      Cancer Other      Substance Abuse Brother      Depression Son      Schizophrenia Brother      Bipolar Disorder Brother      Substance Abuse Brother      Aneurysm Paternal Uncle         Brain Aneurysms         Current Outpatient Medications   Medication Sig Dispense Refill     albuterol (2.5 MG/3ML) 0.083% neb solution Take 1 vial (2.5 mg) by nebulization every 6 hours as needed for shortness of breath / dyspnea or wheezing 60 vial 6     albuterol (PROAIR HFA/PROVENTIL HFA/VENTOLIN HFA) 108 (90 BASE) MCG/ACT Inhaler Inhale 2 puffs into the lungs every 6 hours as needed for shortness of breath / dyspnea or wheezing 1 Inhaler 1     diazepam (VALIUM) 10 MG tablet Take 1 tablet (10 mg) by mouth daily as needed for anxiety or sleep Maximum one in 24 hours. 30 tablet 0     ibuprofen (ADVIL,MOTRIN) 200 MG tablet Take 800 mg by mouth every 4 hours as needed for pain Reported on 2/20/2017 100 tablet 3     losartan-hydrochlorothiazide (HYZAAR) 100-25 MG tablet Take 1 tablet by mouth daily 90 tablet 0     metoprolol succinate ER (TOPROL-XL) 100 MG 24 hr tablet Take 1 tablet (100 mg) by mouth daily 90 tablet 3     mometasone-formoterol (DULERA) 200-5 MCG/ACT oral inhaler Inhale 2 puffs into the lungs 2 times daily       nystatin (MYCOSTATIN) 067484 UNIT/GM external cream Apply topically 2 times daily 30 g 1     Sodium Fluoride (SF  "5000 PLUS) 1.1 % CREA Use for oral hygiene twice daily 57 g 12     tiotropium (SPIRIVA) 18 MCG capsule Inhale 1 capsule (18 mcg) into the lungs daily 90 capsule 3     triamcinolone (KENALOG) 0.1 % external cream Apply topically 2 times daily 80 g 1     amLODIPine (NORVASC) 5 MG tablet Take 1 tablet (5 mg) by mouth daily (Patient not taking: Reported on 3/28/2019) 90 tablet 1     lidocaine (LIDODERM) 5 % Patch Apply up to 3 patches to painful area at once for up to 12 h within a 24 h period.  Remove after 12 hours. (Patient not taking: Reported on 3/28/2019) 30 patch 1     lidocaine (XYLOCAINE) 2 % topical gel Apply topically 2 times daily as needed for moderate pain (Patient not taking: Reported on 8/20/2018) 30 mL 3     Allergies   Allergen Reactions     Buspirone Nausea     Lisinopril Cough     Vicodin [Hydrocodone-Acetaminophen] Nausea and Vomiting     Recent Labs   Lab Test 08/20/18  1432 08/12/16  0851 03/02/16  1217 08/13/15  1034   LDL  --  55 51 197*   HDL  --  50 56 62   TRIG  --  202* 198* 278*   ALT  --   --  19  --    CR 1.37* 0.89 0.91  --    GFRESTIMATED 40* 66 64  --    GFRESTBLACK 48* 80 77  --    POTASSIUM 4.2 3.7 4.6  --    TSH 1.12  --  0.58 1.84      BP Readings from Last 3 Encounters:   03/28/19 (!) 161/101   11/28/17 133/89   05/16/17 122/76    Wt Readings from Last 3 Encounters:   03/28/19 64.9 kg (143 lb)   08/20/18 62.6 kg (138 lb)   11/28/17 62.6 kg (138 lb)                  Labs reviewed in EPIC    Reviewed and updated as needed this visit by clinical staff  Tobacco  Allergies  Meds  Med Hx  Surg Hx  Fam Hx  Soc Hx      Reviewed and updated as needed this visit by Provider         ROS:  Constitutional, HEENT, cardiovascular, pulmonary, gi and gu systems are negative, except as otherwise noted.    OBJECTIVE:     BP (!) 161/101 (BP Location: Right arm, Patient Position: Sitting, Cuff Size: Adult Regular)   Pulse 63   Temp 98  F (36.7  C) (Oral)   Ht 1.632 m (5' 4.25\")   Wt 64.9 kg " (143 lb)   SpO2 100%   BMI 24.36 kg/m    Body mass index is 24.36 kg/m .  GENERAL: healthy, alert and no distress  EYES: Eyes grossly normal to inspection, PERRL and conjunctivae and sclerae normal  NECK: no adenopathy, no asymmetry, masses, or scars and thyroid normal to palpation  RESP: lungs clear to auscultation - no rales, rhonchi or wheezes  CV: regular rate and rhythm, normal S1 S2, no S3 or S4, no murmur, click or rub, no peripheral edema and peripheral pulses strong  :  Atrophic vaginitis, some yeast perhaps.    MS: no gross musculoskeletal defects noted, no edema    Diagnostic Test Results:  No results found for this or any previous visit (from the past 24 hour(s)).    ASSESSMENT/PLAN:               ICD-10-CM    1. Essential hypertension with goal blood pressure less than 140/90 I10 Basic metabolic panel     metoprolol succinate ER (TOPROL-XL) 100 MG 24 hr tablet   2. COPD, moderate (H) J44.9    3. NSAID long-term use Z79.1 Basic metabolic panel   4. Visit for screening mammogram Z12.31 MA SCREENING DIGITAL BILAT - Future  (s+30)   5. Atrophic vaginitis N95.2 triamcinolone (KENALOG) 0.1 % external cream   6. Candida infection B37.9 nystatin (MYCOSTATIN) 840830 UNIT/GM external cream       Patient Instructions   Call to schedule imaging   -- mammogram    Look into Shingrix vaccine (2 shot series)    Change atenolol to Metoprolol  mg daily    Return to clinic one month recheck BP (call in a couple weeks if BP still over 150/90 to consider adding norvasc prior to our visit)    Nystatin cream for a week or two to area (anti fungal)  Triamcinolone cream up to twice daily as needed for itch  Consider daily barrier cream (like vaseline )         Yue Bermudez MD  [facility]

## 2019-03-28 NOTE — PATIENT INSTRUCTIONS
Call to schedule imaging   -- mammogram    Look into Shingrix vaccine (2 shot series)    Change atenolol to Metoprolol  mg daily    Return to clinic one month recheck BP (call in a couple weeks if BP still over 150/90 to consider adding norvasc prior to our visit)    Nystatin cream for a week or two to area (anti fungal)  Triamcinolone cream up to twice daily as needed for itch  Consider daily barrier cream (like vaseline )

## 2019-03-29 LAB
ANION GAP SERPL CALCULATED.3IONS-SCNC: 8 MMOL/L (ref 3–14)
BUN SERPL-MCNC: 16 MG/DL (ref 7–30)
CALCIUM SERPL-MCNC: 9.2 MG/DL (ref 8.5–10.1)
CHLORIDE SERPL-SCNC: 109 MMOL/L (ref 94–109)
CO2 SERPL-SCNC: 27 MMOL/L (ref 20–32)
CREAT SERPL-MCNC: 1.06 MG/DL (ref 0.52–1.04)
GFR SERPL CREATININE-BSD FRML MDRD: 58 ML/MIN/{1.73_M2}
GLUCOSE SERPL-MCNC: 83 MG/DL (ref 70–99)
POTASSIUM SERPL-SCNC: 3.9 MMOL/L (ref 3.4–5.3)
SODIUM SERPL-SCNC: 144 MMOL/L (ref 133–144)

## 2019-03-29 NOTE — RESULT ENCOUNTER NOTE
Georgette Pereira    Enclosed are your results.  Your labs are normal/stable at this time.  Kidney function is much better.      Please call  with any questions.  We can also discuss any questions regarding these labs at your next scheduled visit.    Sincerely,    Yue Bermudez M.D.

## 2019-04-02 ENCOUNTER — OFFICE VISIT (OUTPATIENT)
Dept: PSYCHOLOGY | Facility: CLINIC | Age: 59
End: 2019-04-02
Attending: INTERNAL MEDICINE
Payer: COMMERCIAL

## 2019-04-02 DIAGNOSIS — F33.1 MAJOR DEPRESSIVE DISORDER, RECURRENT EPISODE, MODERATE WITH ANXIOUS DISTRESS (H): ICD-10-CM

## 2019-04-02 DIAGNOSIS — F41.1 GAD (GENERALIZED ANXIETY DISORDER): Primary | ICD-10-CM

## 2019-04-02 PROCEDURE — 90834 PSYTX W PT 45 MINUTES: CPT | Performed by: SOCIAL WORKER

## 2019-04-02 ASSESSMENT — ANXIETY QUESTIONNAIRES
5. BEING SO RESTLESS THAT IT IS HARD TO SIT STILL: NEARLY EVERY DAY
GAD7 TOTAL SCORE: 18
7. FEELING AFRAID AS IF SOMETHING AWFUL MIGHT HAPPEN: NEARLY EVERY DAY
2. NOT BEING ABLE TO STOP OR CONTROL WORRYING: NEARLY EVERY DAY
1. FEELING NERVOUS, ANXIOUS, OR ON EDGE: NEARLY EVERY DAY
6. BECOMING EASILY ANNOYED OR IRRITABLE: NOT AT ALL
3. WORRYING TOO MUCH ABOUT DIFFERENT THINGS: NEARLY EVERY DAY
IF YOU CHECKED OFF ANY PROBLEMS ON THIS QUESTIONNAIRE, HOW DIFFICULT HAVE THESE PROBLEMS MADE IT FOR YOU TO DO YOUR WORK, TAKE CARE OF THINGS AT HOME, OR GET ALONG WITH OTHER PEOPLE: VERY DIFFICULT

## 2019-04-02 ASSESSMENT — PATIENT HEALTH QUESTIONNAIRE - PHQ9
SUM OF ALL RESPONSES TO PHQ QUESTIONS 1-9: 17
5. POOR APPETITE OR OVEREATING: NEARLY EVERY DAY

## 2019-04-02 NOTE — PROGRESS NOTES
Progress Note - Initial Session    Client Name:  Georgette Pereira Date: 4-02-19         Service Type: Individual  Video Visit: No     Session Start Time: 11:30  Session End Time: 12:15     Session Length: 45    Session #: 1    Attendees: Client     DATA:  Diagnostic Assessment in progress.  Unable to complete documentation at the conclusion of the first session due to extensive social history and stressors in life that needed to be discussed    Interactive Complexity: No  Crisis: No    Intervention:  Motivational Interviewing: [facility] and [place] [facility]    ASSESSMENT:  Mental Status Assessment:  Appearance:   Appropriate   Eye Contact:   Fair   Psychomotor Behavior: Restless   Attitude:   Cooperative   Orientation:   All  Speech   Rate / Production: Hyperverbal    Volume:  Normal   Mood:    Anxious  Depressed  Elevated   Affect:    Bright  Expansive  Worrisome   Thought Content:  Paranoia  Referential Thinking  Rumination   Thought Form:  Blocking  Flight of Ideas  Obsessive   Insight:    Fair       Safety Issues and Plan for Safety and Risk Management:  Client denies current fears or concerns for personal safety.  Client denies current or recent suicidal ideation or behaviors.  Client denies current or recent homicidal ideation or behaviors.  Client denies current or recent self injurious behavior or ideation.  Client denies other safety concerns.  A safety and risk management plan has not been developed at this time, however client was given the after-hours number / 911 should there be a change in any of these risk factors.  Client reports there are no firearms in the house.      Diagnostic Criteria:  A. Excessive anxiety and worry about a number of events or activities (such as work or school performance).   B. The person finds it difficult to control the worry.  C. Select 3 or more symptoms (required for diagnosis). Only one item is required in children.   - Restlessness or feeling keyed up or on  edge.    - Being easily fatigued.    - Difficulty concentrating or mind going blank.    - Muscle tension.    - Sleep disturbance (difficulty falling or staying asleep, or restless unsatisfying sleep).   D. The focus of the anxiety and worry is not confined to features of an Axis I disorder.  E. The anxiety, worry, or physical symptoms cause clinically significant distress or impairment in social, occupational, or other important areas of functioning.   F. The disturbance is not due to the direct physiological effects of a substance (e.g., a drug of abuse, a medication) or a general medical condition (e.g., hyperthyroidism) and does not occur exclusively during a Mood Disorder, a Psychotic Disorder, or a Pervasive Developmental Disorder.  A) Recurrent episode(s) - symptoms have been present during the same 2-week period and represent a change from previous functioning 5 or more symptoms (required for diagnosis)   - Depressed mood. Note: In children and adolescents, can be irritable mood.     - Diminished interest or pleasure in all, or almost all, activities.    - Decreased sleep.    - Psychomotor activity agitation.    - Fatigue or loss of energy.    - Feelings of worthlessness or inappropriate and excessive guilt.    - Diminished ability to think or concentrate, or indecisiveness.   B) The symptoms cause clinically significant distress or impairment in social, occupational, or other important areas of functioning  C) The episode is not attributable to the physiological effects of a substance or to another medical condition  D) The occurence of major depressive episode is not better explained by other thought / psychotic disorders      DSM5 Diagnoses: (Sustained by DSM5 Criteria Listed Above)  Diagnoses: 296.32 (F33.1) Major Depressive Disorder, Recurrent Episode, Moderate _ and With anxious distress  300.02 (F41.1) Generalized Anxiety Disorder  R/O Bipolar I Disorder  Psychosocial & Contextual Factors: grief and  loss, family relationship issues, past chemical health issues, medical issues  WHODAS 2.0 (12 item)            This questionnaire asks about difficulties due to health conditions. Health conditions  include  disease or illnesses, other health problems that may be short or long lasting,  injuries, mental health or emotional problems, and problems with alcohol or drugs.                     Think back over the past 30 days and answer these questions, thinking about how much  difficulty you had doing the following activities. For each question, please [place] only  one response.    S1 Standing for long periods such as 30 minutes? Mild =           2   S2 Taking care of household responsibilities? Severe =       4   S3 Learning a new task, for example, learning how to get to a new place? None =         1   S4 How much of a problem do you have joining community activities (for example, festivals, Gnosticist or other activities) in the same way as anyone else can? Severe =       4   S5 How much have you been emotionally affected by your health problems? Mild =           2     In the past 30 days, how much difficulty did you have in:   S6 Concentrating on doing something for ten minutes? Severe =       4   S7 Walking a long distance such as a kilometer (or equivalent)? Mild =           2   S8 Washing your whole body? None =         1   S9 Getting dressed? None =         1   S10 Dealing with people you do not know? Moderate =   3   S11 Maintaining a friendship? None =         1   S12 Your day to day work? None =         1     H1 Overall, in the past 30 days, how many days were these difficulties present? Record number of days 30   H2 In the past 30 days, for how many days were you totally unable to carry out your usual activities or work because of any health condition? Record number of days  5   H3 In the past 30 days, not counting the days that you were totally unable, for how many days did you cut back or reduce your usual  activities or work because of any health condition? Record number of days 15       Collateral Reports Completed:  Not Applicable      PLAN: (Homework, other):  Client scheduled follow up ongoing services with [facility].        GEMMA Fried

## 2019-04-02 NOTE — Clinical Note
Leroy Turner--I started with our patient for her first counseling session.  A lot is going on for this client and will work on alleviating her anxiety and decrease her depression, grief and loss symptoms.  Client stated she was diagnosed with bipolar disorder in the past and will have put as a rule out and will determine as we work more closely together.  I will complete her diagnostic assessment at our next visit. Let me know if you have any questions.  Best, Javier

## 2019-04-03 ASSESSMENT — ANXIETY QUESTIONNAIRES: GAD7 TOTAL SCORE: 18

## 2019-04-15 ENCOUNTER — TELEPHONE (OUTPATIENT)
Dept: FAMILY MEDICINE | Facility: CLINIC | Age: 59
End: 2019-04-15

## 2019-04-15 NOTE — TELEPHONE ENCOUNTER
Panel Management Review      Patient has the following on her problem list:     Hypertension   Last three blood pressure readings:  BP Readings from Last 3 Encounters:   03/28/19 (!) 161/101   11/28/17 133/89   05/16/17 122/76     Blood pressure: FAILED    HTN Guidelines:  Less than 140/90      Composite cancer screening  Chart review shows that this patient is due/due soon for the following Mammogram  Summary:    Patient is due/failing the following:   BP CHECK and MAMMOGRAM    Action needed:   Due for a mammogram    Type of outreach:    Mammogram was ordered on 3/28/19    Questions for provider review:    None                                                                                                                                    Lisa Perdue ma       Chart routed to closing chart  .

## 2019-04-24 ENCOUNTER — OFFICE VISIT (OUTPATIENT)
Dept: PSYCHOLOGY | Facility: CLINIC | Age: 59
End: 2019-04-24
Payer: COMMERCIAL

## 2019-04-24 DIAGNOSIS — F41.1 GAD (GENERALIZED ANXIETY DISORDER): Primary | ICD-10-CM

## 2019-04-24 DIAGNOSIS — F33.1 MAJOR DEPRESSIVE DISORDER, RECURRENT EPISODE, MODERATE WITH ANXIOUS DISTRESS (H): ICD-10-CM

## 2019-04-24 PROCEDURE — 90791 PSYCH DIAGNOSTIC EVALUATION: CPT | Performed by: SOCIAL WORKER

## 2019-04-24 NOTE — Clinical Note
Leroy Ruiz completed our patient's diagnostic assessment.  I will be helping her with grief and loss, reducing he depression and anxiety symptoms.  Let me know if you have any questions.  Best, Javier
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
## 2019-05-06 ENCOUNTER — OFFICE VISIT (OUTPATIENT)
Dept: PSYCHOLOGY | Facility: CLINIC | Age: 59
End: 2019-05-06
Payer: COMMERCIAL

## 2019-05-06 DIAGNOSIS — F33.1 MAJOR DEPRESSIVE DISORDER, RECURRENT EPISODE, MODERATE WITH ANXIOUS DISTRESS (H): ICD-10-CM

## 2019-05-06 DIAGNOSIS — F41.1 GAD (GENERALIZED ANXIETY DISORDER): Primary | ICD-10-CM

## 2019-05-06 PROCEDURE — 90834 PSYTX W PT 45 MINUTES: CPT | Performed by: SOCIAL WORKER

## 2019-05-06 ASSESSMENT — ANXIETY QUESTIONNAIRES
5. BEING SO RESTLESS THAT IT IS HARD TO SIT STILL: NEARLY EVERY DAY
7. FEELING AFRAID AS IF SOMETHING AWFUL MIGHT HAPPEN: NOT AT ALL
GAD7 TOTAL SCORE: 15
6. BECOMING EASILY ANNOYED OR IRRITABLE: NOT AT ALL
2. NOT BEING ABLE TO STOP OR CONTROL WORRYING: NEARLY EVERY DAY
1. FEELING NERVOUS, ANXIOUS, OR ON EDGE: NEARLY EVERY DAY
IF YOU CHECKED OFF ANY PROBLEMS ON THIS QUESTIONNAIRE, HOW DIFFICULT HAVE THESE PROBLEMS MADE IT FOR YOU TO DO YOUR WORK, TAKE CARE OF THINGS AT HOME, OR GET ALONG WITH OTHER PEOPLE: VERY DIFFICULT
3. WORRYING TOO MUCH ABOUT DIFFERENT THINGS: NEARLY EVERY DAY

## 2019-05-06 ASSESSMENT — PATIENT HEALTH QUESTIONNAIRE - PHQ9
SUM OF ALL RESPONSES TO PHQ QUESTIONS 1-9: 13
5. POOR APPETITE OR OVEREATING: NEARLY EVERY DAY

## 2019-05-06 NOTE — PROGRESS NOTES
Progress Note    Client Name: Georgette Pereira  Date: 5-06-19         Service Type: Individual  Video Visit: No     Session Start Time: 4:30  Session End Time: 5:15     Session Length: 45    Session #: 3    Attendees: Client     Treatment Plan Last Reviewed: started tx plan today: 5-06-19  PHQ-9 / AGUSTIN-7 : Completed this session: Lower scores on both screenings this session.    DATA  Interactive Complexity: No  Crisis: No       Progress Since Last Session (Related to Symptoms / Goals / Homework):   Symptoms: Improving Less anxiety and depression symptoms this session    Homework: Achieved / completed to satisfaction; Client connected with [employer] this session and engaged in thought stopping process when experiencing negative emotions.       Episode of Care Goals: Achieved / completed to satisfaction - ACTION (Actively working towards change); Intervened by reinforcing change plan / affirming steps taken     Current / Ongoing Stressors and Concerns:          Grief and loss issues, family and partner relationship issues, past chemical health issues, medical issues        Treatment Objective(s) Addressed in This Session:   Utilize thought stopping process when experiecing negative emotions  Engage in [email or web address] Adalberto daily to reduce worry and anxiety     Intervention:   Solution Focused: Development of tx goals for therapy        ASSESSMENT: Current Emotional / Mental Status (status of significant symptoms):   Risk status (Self / Other harm or suicidal ideation)   Client denies current fears or concerns for personal safety.   Client denies current or recent suicidal ideation or behaviors.   Client denies current or recent homicidal ideation or behaviors.   Client denies current or recent self injurious behavior or ideation.   Client denies other safety concerns.   Client Client reports there has been no change in risk factors since their last session.     Client Client  reports there has been no change in protective factors since their last session.     A safety and risk management plan has not been developed at this time, however client was given the after-hours number / 911 should there be a change in any of these risk factors.     Appearance:   Appropriate    Eye Contact:   Good    Psychomotor Behavior: Restless    Attitude:   Cooperative    Orientation:   All   Speech    Rate / Production: Pressured     Volume:  Normal    Mood:    Anxious  Depressed  Elevated    Affect:    Bright  Expansive  Worrisome    Thought Content:  Paranoia  Rumination    Thought Form:  Coherent  Goal Directed  Logical    Insight:    Fair      Medication Review:   No changes to current psychiatric medication(s)     Medication Compliance:   Yes     Changes in Health Issues:   None reported     Chemical Use Review:   Substance Use: Chemical use reviewed, no active concerns identified      Tobacco Use: No current tobacco use.      Diagnosis:  No diagnosis found.    Collateral Reports Completed:   Not Applicable    PLAN: (Client Tasks / Therapist Tasks / Other)  Client will engage in relaxation strategies and use [email or web address] Adalber[employer] to learn guided imagery, deep breathing exercises to lessen anxiety and worry. Client will also engage in a thought stopping process and engage in positive distraction activities for self when stress, feeling depressed or worried. We worked on tx plan for the future in session today.         GEMMA Fried                                                         ______________________________________________________________________    Treatment Plan    Client's Name: Georgette Pereira  YOB: 1960    Date: 5-06-19    DSM-V Diagnoses: 296.32 (F33.1) Major Depressive Disorder, Recurrent Episode, Moderate _ and With anxious distress  300.02 (F41.1) Generalized Anxiety Disorder  R/O Bipolar Disorder    Psychosocial & Contextual Factors: grief and loss issues, family  and partner relationship issues, past chemical health issues, medical issues      WHODAS: Completed first session    Referral / Collaboration:  Referral to another professional/service is not indicated at this time..    Anticipated number of session or this episode of care: 15      MeasurableTreatment Goal(s) related to diagnosis / functional impairment(s)  Goal 1: Client will decrease depression and anxiety symptoms and return to normal daily functioning.    I will know I've met my goal when I can sleep through the night without worrying.      Objective #A (Client Action)    Client will use cognitive strategies identified in therapy to challenge anxious thoughts and depressed thoughts.  Status: Continued - Date(s):5-06-19     Intervention(s)  Therapist will assign homework Client will be given a mood log to learn CBT skills and use as needed until it becomes automatic to improve overall mood.     Objective #B  Client will engage with [email or web address] Adalberto to learn relaxation skills and reduce stress and anxiety.  Status: Continued - Date(s): 5-06-19    Intervention(s)  Therapist will assign homework Client will practice relaxation and meditation skills daily to reduce anxiety.     Objective #C  Client will engage in at least three positive distraction skills to improve overall mood.   Status: Continued - Date(s):5-06-19     Intervention(s)  Therapist will assign homework Client will make a list of positive distraction activities to engage in when anxious or depressed to improve her mood. .        Client has reviewed and agreed to the above plan.      Javier Holden, [facility]  May 6, 2019

## 2019-05-07 ASSESSMENT — ANXIETY QUESTIONNAIRES: GAD7 TOTAL SCORE: 15

## 2019-05-29 DIAGNOSIS — J44.9 COPD (CHRONIC OBSTRUCTIVE PULMONARY DISEASE) (H): Primary | ICD-10-CM

## 2019-06-13 ENCOUNTER — OFFICE VISIT (OUTPATIENT)
Dept: PSYCHOLOGY | Facility: CLINIC | Age: 59
End: 2019-06-13
Payer: COMMERCIAL

## 2019-06-13 DIAGNOSIS — F33.1 MAJOR DEPRESSIVE DISORDER, RECURRENT EPISODE, MODERATE WITH ANXIOUS DISTRESS (H): ICD-10-CM

## 2019-06-13 DIAGNOSIS — F41.1 GAD (GENERALIZED ANXIETY DISORDER): Primary | ICD-10-CM

## 2019-06-13 PROCEDURE — 90834 PSYTX W PT 45 MINUTES: CPT | Performed by: SOCIAL WORKER

## 2019-06-13 NOTE — PROGRESS NOTES
Progress Note    Client Name: Georgette Pereira  Date: 6-13-19         Service Type: Individual  Video Visit: No     Session Start Time: 4:30  Session End Time: 5:15     Session Length: 45    Session #: 4    Attendees: Client     Treatment Plan Last Reviewed: started tx plan today: 5-06-19  PHQ-9 / AGUSTIN-7 : Complete next session:     DATA  Interactive Complexity: No  Crisis: No       Progress Since Last Session (Related to Symptoms / Goals / Homework):   Symptoms: Improving Less anxiety and depression symptoms this session.  Increased stress and frustration with partner and his health.     Homework: Achieved / completed to satisfaction; Previous sessions: Client connected with [employer] ADALBERTO this session and engaged in thought stopping process when experiencing negative emotions. Current session:  Client engaging in outside activities for self and with family.  Still estranged from her sister but her brother has initiated contact and wants to reconcile.  Client also has son living with her which she enjoys and she also enjoys spending time with her grandchildren. Mood overall is stable.       Episode of Care Goals: Achieved / completed to satisfaction - ACTION (Actively working towards change); Intervened by reinforcing change plan / affirming steps taken     Current / Ongoing Stressors and Concerns:          Grief and loss issues, family and partner relationship issues, past chemical health issues, medical issues        Treatment Objective(s) Addressed in This Session:   Utilize thought stopping process when experiecing negative emotions  Engage in [email or web address] Adalberto daily to reduce worry and anxiety  Assertiveness worksheets   Intervention:   Solution Focused: Development of tx goals for therapy  Strength based therapy      ASSESSMENT: Current Emotional / Mental Status (status of significant symptoms):   Risk status (Self / Other harm or suicidal ideation)   Client denies  current fears or concerns for personal safety.   Client denies current or recent suicidal ideation or behaviors.   Client denies current or recent homicidal ideation or behaviors.   Client denies current or recent self injurious behavior or ideation.   Client denies other safety concerns.   Client Client reports there has been no change in risk factors since their last session.     Client Client reports there has been no change in protective factors since their last session.     A safety and risk management plan has not been developed at this time, however client was given the after-hours number / 911 should there be a change in any of these risk factors.     Appearance:   Appropriate    Eye Contact:   Good    Psychomotor Behavior: Restless    Attitude:   Cooperative    Orientation:   All   Speech    Rate / Production: Pressured     Volume:  Normal    Mood:    Anxious  Depressed    Affect:    Bright  Expansive  Worrisome    Thought Content:  Rumination    Thought Form:  Coherent  Goal Directed  Logical    Insight:    Fair      Medication Review:   No changes to current psychiatric medication(s)     Medication Compliance:   Yes     Changes in Health Issues:   None reported     Chemical Use Review:   Substance Use: Chemical use reviewed, no active concerns identified      Tobacco Use: No current tobacco use.      Diagnosis:  No diagnosis found.    Collateral Reports Completed:   Not Applicable    PLAN: (Client Tasks / Therapist Tasks / Other)  Previous sessions: Client will engage in relaxation strategies and use [email or web address] Ad[employer] to learn guided imagery, deep breathing exercises to lessen anxiety and worry. Client will also engage in a thought stopping process and engage in positive distraction activities for self when stress, feeling depressed or worried. We worked on tx plan for the future in session today.Current session: Client engaging in outside activities for self and with family.  Still estranged from her sister  but her brother has initiated contact and wants to reconcile.  Client also has son living with her which she enjoys and she also enjoys spending time with her grandchildren. Mood overall is stable. Client was given assertiveness worksheets to read and complete and practice assertive responses to issues she is having with her partner and others as needed.  We will process what she learn from completing these worksheets and how she practiced asserting herself at our next session.  Continue self- care and engaging in positive activities that she enjoys to improve her mood.         Javier Holden, [facility]                                                         ______________________________________________________________________    Treatment Plan    Client's Name: Georgette Pereira  YOB: 1960    Date: 5-06-19    DSM-V Diagnoses: 296.32 (F33.1) Major Depressive Disorder, Recurrent Episode, Moderate _ and With anxious distress  300.02 (F41.1) Generalized Anxiety Disorder  R/O Bipolar Disorder    Psychosocial & Contextual Factors: grief and loss issues, family and partner relationship issues, past chemical health issues, medical issues      WHODAS: Completed first session    Referral / Collaboration:  Referral to another professional/service is not indicated at this time..    Anticipated number of session or this episode of care: 15      MeasurableTreatment Goal(s) related to diagnosis / functional impairment(s)  Goal 1: Client will decrease depression and anxiety symptoms and return to normal daily functioning.    I will know I've met my goal when I can sleep through the night without worrying.      Objective #A (Client Action)    Client will use cognitive strategies identified in therapy to challenge anxious thoughts and depressed thoughts.  Status: Continued - Date(s):5-06-19     Intervention(s)  Therapist will assign homework Client will be given a mood log to learn CBT skills and use as needed until  it becomes automatic to improve overall mood.     Objective #B  Client will engage with [email or web address] Ad[employer] to learn relaxation skills and reduce stress and anxiety.  Status: Continued - Date(s): 5-06-19    Intervention(s)  Therapist will assign homework Client will practice relaxation and meditation skills daily to reduce anxiety.     Objective #C  Client will engage in at least three positive distraction skills to improve overall mood.   Status: Continued - Date(s):5-06-19     Intervention(s)  Therapist will assign homework Client will make a list of positive distraction activities to engage in when anxious or depressed to improve her mood. .        Client has reviewed and agreed to the above plan.      Javier Holden, [facility]  May 6, 2019

## 2019-06-14 DIAGNOSIS — I10 ESSENTIAL HYPERTENSION WITH GOAL BLOOD PRESSURE LESS THAN 140/90: Chronic | ICD-10-CM

## 2019-06-14 NOTE — TELEPHONE ENCOUNTER
"Requested Prescriptions   Pending Prescriptions Disp Refills     losartan-hydrochlorothiazide (HYZAAR) 100-25 MG tablet [Pharmacy Med Name: LOSARTAN/HCTZ 100/25MG TABLETS] 90 tablet 0     Sig: TAKE 1 TABLET BY MOUTH DAILY       Angiotensin-II Receptors Failed - 6/14/2019  2:09 PM        Failed - Blood pressure under 140/90 in past 12 months     BP Readings from Last 3 Encounters:   03/28/19 (!) 161/101   11/28/17 133/89   05/16/17 122/76                 Failed - Normal serum creatinine on file in past 12 months     Recent Labs   Lab Test 03/28/19  1701   CR 1.06*             Passed - Recent (12 mo) or future (30 days) visit within the authorizing provider's specialty     Patient had office visit in the last 12 months or has a visit in the next 30 days with authorizing provider or within the authorizing provider's specialty.  See \"Patient Info\" tab in inbasket, or \"Choose Columns\" in Meds & Orders section of the refill encounter.              Passed - Medication is active on med list        Passed - Patient is age 18 or older        Passed - No active pregnancy on record        Passed - Normal serum potassium on file in past 12 months     Recent Labs   Lab Test 03/28/19  1701   POTASSIUM 3.9                    Passed - No positive pregnancy test in past 12 months        Last Written Prescription Date:  03/07/19  Last Fill Quantity: 90,  # refills: 0   Last office visit: 3/28/2019 with prescribing provider:     Future Office Visit:   Next 5 appointments (look out 90 days)    Jun 18, 2019 10:00 AM CDT  [facility] with [facility] LAB  [facility] [address] [place] [id number]  [phone]   Jun 27, 2019  4:30 PM CDT  Return Visit with GEMMA Lara  [facility] ([facility]) [address] [facility] [id number] [phone]   Jul 30, 2019  4:00 PM CDT  Return Visit with Javier Holden, " [facility] ([facility]) [address] [facility] [id number] [phone]

## 2019-06-17 RX ORDER — LOSARTAN POTASSIUM AND HYDROCHLOROTHIAZIDE 25; 100 MG/1; MG/1
1 TABLET ORAL DAILY
Qty: 90 TABLET | Refills: 1 | Status: SHIPPED | OUTPATIENT
Start: 2019-06-17 | End: 2020-04-13

## 2019-06-18 ENCOUNTER — OFFICE VISIT (OUTPATIENT)
Dept: NURSING | Facility: CLINIC | Age: 59
End: 2019-06-18
Payer: COMMERCIAL

## 2019-06-18 ENCOUNTER — ANCILLARY PROCEDURE (OUTPATIENT)
Dept: GENERAL RADIOLOGY | Facility: CLINIC | Age: 59
End: 2019-06-18
Attending: INTERNAL MEDICINE
Payer: COMMERCIAL

## 2019-06-18 ENCOUNTER — OFFICE VISIT (OUTPATIENT)
Dept: PULMONOLOGY | Facility: CLINIC | Age: 59
End: 2019-06-18
Payer: COMMERCIAL

## 2019-06-18 VITALS
RESPIRATION RATE: 16 BRPM | TEMPERATURE: 98.4 F | HEIGHT: 64 IN | DIASTOLIC BLOOD PRESSURE: 101 MMHG | SYSTOLIC BLOOD PRESSURE: 153 MMHG | OXYGEN SATURATION: 99 % | WEIGHT: 144.8 LBS | HEART RATE: 80 BPM | BODY MASS INDEX: 24.72 KG/M2

## 2019-06-18 DIAGNOSIS — R06.02 SHORTNESS OF BREATH: ICD-10-CM

## 2019-06-18 DIAGNOSIS — J44.9 COPD (CHRONIC OBSTRUCTIVE PULMONARY DISEASE) (H): ICD-10-CM

## 2019-06-18 DIAGNOSIS — J44.9 CHRONIC OBSTRUCTIVE PULMONARY DISEASE, UNSPECIFIED COPD TYPE (H): Primary | ICD-10-CM

## 2019-06-18 DIAGNOSIS — J44.9 COPD, MODERATE (H): Chronic | ICD-10-CM

## 2019-06-18 DIAGNOSIS — J44.9 COPD, MODERATE (H): Primary | Chronic | ICD-10-CM

## 2019-06-18 DIAGNOSIS — Z87.891 PERSONAL HISTORY OF TOBACCO USE, PRESENTING HAZARDS TO HEALTH: ICD-10-CM

## 2019-06-18 PROCEDURE — 94375 RESPIRATORY FLOW VOLUME LOOP: CPT | Performed by: INTERNAL MEDICINE

## 2019-06-18 PROCEDURE — 99205 OFFICE O/P NEW HI 60 MIN: CPT | Mod: 25 | Performed by: INTERNAL MEDICINE

## 2019-06-18 PROCEDURE — 71046 X-RAY EXAM CHEST 2 VIEWS: CPT | Performed by: RADIOLOGY

## 2019-06-18 PROCEDURE — 94726 PLETHYSMOGRAPHY LUNG VOLUMES: CPT | Performed by: INTERNAL MEDICINE

## 2019-06-18 PROCEDURE — 94729 DIFFUSING CAPACITY: CPT | Performed by: INTERNAL MEDICINE

## 2019-06-18 RX ORDER — NICOTINE 21 MG/24HR
1 PATCH, TRANSDERMAL 24 HOURS TRANSDERMAL EVERY 24 HOURS
Qty: 14 PATCH | Refills: 0 | Status: SHIPPED | OUTPATIENT
Start: 2019-06-18 | End: 2020-06-30

## 2019-06-18 RX ORDER — TIOTROPIUM BROMIDE 18 UG/1
18 CAPSULE ORAL; RESPIRATORY (INHALATION) DAILY
Qty: 90 CAPSULE | Refills: 3 | Status: SHIPPED | OUTPATIENT
Start: 2019-06-18 | End: 2021-06-01

## 2019-06-18 RX ORDER — NICOTINE 21 MG/24HR
1 PATCH, TRANSDERMAL 24 HOURS TRANSDERMAL EVERY 24 HOURS
Qty: 28 PATCH | Refills: 0 | Status: SHIPPED | OUTPATIENT
Start: 2019-06-18 | End: 2020-06-30

## 2019-06-18 RX ORDER — ATENOLOL 100 MG/1
TABLET ORAL
COMMUNITY
Start: 2019-03-21 | End: 2021-06-01

## 2019-06-18 ASSESSMENT — PAIN SCALES - GENERAL: PAINLEVEL: NO PAIN (0)

## 2019-06-18 ASSESSMENT — MIFFLIN-ST. JEOR: SCORE: 1225.78

## 2019-06-18 NOTE — PROGRESS NOTES
Pulmonary Clinic New Patient Consult  Reason for Consult: COPD  History of Present Illness    Ms. Pereira is a 58-year-old female with a history of COPD who presents to pulmonary clinic today for further evaluation and management of shortness of breath and COPD.  She tells me she was diagnosed with COPD about 4 years ago.  Around this time she had pneumonia and was hospitalized.  She has been maintained on Spiriva, high-dose Dulera, and albuterol as needed for at least this long.   on average, she states she uses her albuterol inhaler about 1 time per day has not used it at all over the course of the last 3 weeks.  She also tells me that she is using old stockpiled inhalers, due to transient loss of insurance, and is unsure if any of these have  or still active.  At present she is able to walk about half a block before she would need to stop and rest.  Going back 6 months ago this distance is the same.  We would need to go back more than 1 to 2 years before she would be able to walk further than this.  She denies any cough, sputum production, fevers, chills, or hemoptysis.  She denies any shortness of breath at rest.  She does not recall the last time that she was treated with steroids and antibiotics for breathing problem, aside from the hospitalization 4 years ago.  Denies any history of asthma either in childhood or earlier in adult life.  Denies any problems with seasonal allergies or hayfever.  Denies any pounds with heartburn or acid reflux.    She is a previous half pack per day smoker x20 years.  For the last 11 years she has been smoking E cigarettes in any equivalency that she thinks close to about a pack a day.  She is previously quit successfully with nicotine patches but this did not last.  She is recently thought about quitting but has not pursued any pharmacologic therapies to help with this recently.    She lives in [place], [place] in an older apartment building.  She has no pets at  home.  Denies any known exposure to asbestos.  Is currently employed as a .  Denies any significant exposure to birds, pillows or comforter stuffed with down feathers, hot tub or Jacuzzi she uses on a regular basis, recent travel outside the area.    Family history is notable for a grandmother with emphysema and lung cancer.  Her dad also had lung cancer.  Both were smokers.      Review of Systems:  10 of 14 systems reviewed and are negative unless otherwise stated in HPI.    Past Medical History:   Diagnosis Date     ACL (anterior cruciate ligament) tear 2/27/2012    Left      Chronic obstructive pulmonary disease with acute exacerbation (H) 11/23/2014    First hospitalization 11/23/14      CTS (carpal tunnel syndrome) 5/2010     Degenerative joint disease      Temporomandibular joint disorders, unspecified        Past Surgical History:   Procedure Laterality Date     C TMJ ARTHROSCOPY/SURGERY  2000     HYSTERECTOMY VAGINAL         Family History   Problem Relation Age of Onset     Breast Cancer Maternal Grandmother      Coronary Artery Disease Maternal Grandmother         MI at 41     Cerebrovascular Disease Mother      Hypertension Mother      Heart Disease Mother      Cancer Mother         Lung     Substance Abuse Mother      Cerebrovascular Disease Father      Hypertension Father      Cancer Father         Metastatic, primary lung/Prostate     Substance Abuse Father      Cancer Other      Substance Abuse Brother      Depression Son      Schizophrenia Brother      Bipolar Disorder Brother      Substance Abuse Brother      Aneurysm Paternal Uncle         Brain Aneurysms       Social History     Socioeconomic History     Marital status:      Spouse name: None     Number of children: None     Years of education: None     Highest education level: None   Occupational History     None   Social Needs     Financial resource strain: None     Food insecurity:     Worry: None     Inability: None      Transportation needs:     Medical: None     Non-medical: None   Tobacco Use     Smoking status: Former Smoker     Packs/day: 1.00     Years: 32.00     Pack years: 32.00     Types: Cigarettes, Other     Last attempt to quit: 2012     Years since quittin.4     Smokeless tobacco: Never Used     Tobacco comment: using nicotine replacement: e cigarattes   Substance and Sexual Activity     Alcohol use: No     Alcohol/week: 0.0 oz     Drug use: No     Sexual activity: Never     Partners: Male     Birth control/protection: Surgical   Lifestyle     Physical activity:     Days per week: None     Minutes per session: None     Stress: None   Relationships     Social connections:     Talks on phone: None     Gets together: None     Attends [employer] service: None     Active member of club or organization: None     Attends meetings of clubs or organizations: None     Relationship status: None     Intimate partner violence:     Fear of current or ex partner: None     Emotionally abused: None     Physically abused: None     Forced sexual activity: None   Other Topics Concern     Parent/sibling w/ CABG, MI or angioplasty before 65F 55M? Yes   Social History Narrative    Lives in apartment, single ().  2 kids.  Drives [employer].          The patient has a 30 pk yr tobacco hx.  She has no active use but using e -cig. Quit actual cig Oct 2012.  Alcohol use is 0 alcoholic drinks per week.  She denies use of recreational drugs.          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          The patient is single.  Has 2 children.        Hot Tube Exposure: NO    Recent Travel: NO     Hx of incarceration:  NO    Bird Exposure:   NO    Animal Exposure:  NO    Inhalation Exposure:  NO                 Allergies   Allergen Reactions     Buspirone Nausea     Lisinopril Cough     Vicodin [Hydrocodone-Acetaminophen] Nausea and Vomiting         Current Outpatient Medications:      albuterol (2.5  MG/3ML) 0.083% neb solution, Take 1 vial (2.5 mg) by nebulization every 6 hours as needed for shortness of breath / dyspnea or wheezing, Disp: 60 vial, Rfl: 6     albuterol (PROAIR HFA/PROVENTIL HFA/VENTOLIN HFA) 108 (90 BASE) MCG/ACT Inhaler, Inhale 2 puffs into the lungs every 6 hours as needed for shortness of breath / dyspnea or wheezing, Disp: 1 Inhaler, Rfl: 1     atenolol (TENORMIN) 100 MG tablet, , Disp: , Rfl:      ibuprofen (ADVIL,MOTRIN) 200 MG tablet, Take 800 mg by mouth every 4 hours as needed for pain Reported on 2/20/2017, Disp: 100 tablet, Rfl: 3     losartan-hydrochlorothiazide (HYZAAR) 100-25 MG tablet, TAKE 1 TABLET BY MOUTH DAILY, Disp: 90 tablet, Rfl: 1     metoprolol succinate ER (TOPROL-XL) 100 MG 24 hr tablet, Take 1 tablet (100 mg) by mouth daily, Disp: 90 tablet, Rfl: 3     mometasone-formoterol (DULERA) 200-5 MCG/ACT oral inhaler, Inhale 2 puffs into the lungs 2 times daily, Disp: , Rfl:      nystatin (MYCOSTATIN) 528483 UNIT/GM external cream, Apply topically 2 times daily, Disp: 30 g, Rfl: 1     Sodium Fluoride (SF 5000 PLUS) 1.1 % CREA, Use for oral hygiene twice daily, Disp: 57 g, Rfl: 12     tiotropium (SPIRIVA) 18 MCG capsule, Inhale 1 capsule (18 mcg) into the lungs daily, Disp: 90 capsule, Rfl: 3     triamcinolone (KENALOG) 0.1 % external cream, Apply topically 2 times daily, Disp: 80 g, Rfl: 1     amLODIPine (NORVASC) 5 MG tablet, Take 1 tablet (5 mg) by mouth daily (Patient not taking: Reported on 3/28/2019), Disp: 90 tablet, Rfl: 1     diazepam (VALIUM) 10 MG tablet, Take 1 tablet (10 mg) by mouth daily as needed for anxiety or sleep Maximum one in 24 hours. (Patient not taking: Reported on 6/18/2019), Disp: 30 tablet, Rfl: 0     lidocaine (XYLOCAINE) 2 % topical gel, Apply topically 2 times daily as needed for moderate pain (Patient not taking: Reported on 8/20/2018), Disp: 30 mL, Rfl: 3      Physical Exam:  BP (!) 153/101   Pulse 80   Temp 98.4  F (36.9  C)   Resp 16   Ht  "1.632 m (5' 4.25\")   Wt 65.7 kg (144 lb 12.8 oz)   SpO2 99%   BMI 24.66 kg/m    GENERAL: Well developed, well nourished, alert, and in no apparent distress.  HEENT: Normocephalic, atraumatic. PERRL, EOMI. Oral mucosa is moist. No perioral cyanosis.  NECK: supple, no masses, no thyromegaly.  RESP:  Normal respiratory effort.  Diminished air entry bilaterally but otherwise CTAB.  No rales, wheezes, rhonchi.  No cyanosis or clubbing.  CV: Normal S1, S2, regular rhythm, normal rate. No murmur.  No LE edema.   ABDOMEN:  Soft, non-tender, non-distended.   SKIN: warm and dry. No rash.  NEURO: AAOx3.  Normal gait.  No focal neuro deficits.  PSYCH: mentation appears normal. and affect normal/bright    Results:  PFTs: Ratio 47%, FVC 84%, FEV1 53%, %, %, DLCO 78%.  Study demonstrates a moderately severe obstructive ventilatory defect with hyperinflation, air trapping, and preservation of gas exchange.  When compared to previous pulmonary function testing from 2015 there has been an overall decline in lung function.  Imaging (personally reviewed in clinic today):  CXR: emphysematous lungs.  Otherwise clear.    Assessment and Plan:   Georgette Pereira is a 58 year old female with a history of COPD and ongoing tobacco abuse who presents to pulmonary clinic today for further evaluation of COPD and shortness of breath.  Given continued decline in lung function (15%) over the last 4 years, it is most likely that her dyspnea is related to her COPD.    1) Smoking Cessation. We discussed that the only way to prevent further decline in lung function would be to quit smoking all together.  Complete cessation from all tobacco products was strongly encouraged.  She was agreeable to nicotine patches to assist in this effort.  A referral for [facility] was placed as well.    2) COPD. With regard to inhaler therapy, she is currently on a maximal regimen including Spiriva and high dose Dulera and I would not make any " changes at this time.  She has not historically had problems with recurrent pneumonia or AECOPD so there is no indication for addition of agents which would be targeted at preventing exacerbation recurrence.  New prescriptions for refills were provided to her today.  She was encouraged to check the expiration dates of her stockpiled inhalers at home to ensure they haven't  and therefore diminished in efficacy.  3) Dyspnea.  While I suspect her SOB can be largely attributed to her declining lung function in the setting of continued tobacco exposure and COPD, I think it would be worthwhile to obtain additional testing to ensure we are not missing another competing diagnosis.  Therefore I have ordered TTE for cardiac evaluation and CT chest to ensure there are no other parenchymal lung abnormalities which would better account for her dyspnea.  We will be in touch with her regarding results.   Questions and concerns were answered to the patient's satisfaction.  she was provided with my contact information should new questions or concerns arise in the interim.  she should return to clinic in 6 months with deepa/DL.  She is up to date on pneumovax ().  Ewa Decker MD  Pulmonary and Critical Care Medicine    The above note was dictated using voice recognition software and may include typographical errors. Please contact the author for any clarifications.

## 2019-06-18 NOTE — NURSING NOTE
"Georgette Pereira's goals for this visit include: Return  She requests these members of her care team be copied on today's visit information: PCP    PCP: Yue Bermudez    Referring Provider:  Yue Bermudez MD  [address] [place] [address]    BP (!) 153/101   Pulse 80   Temp 98.4  F (36.9  C)   Resp 16   Ht 1.632 m (5' 4.25\")   Wt 65.7 kg (144 lb 12.8 oz)   SpO2 99%   BMI 24.66 kg/m      Do you need any medication refills at today's visit? N    "

## 2019-06-19 LAB
DLCOUNC-%PRED-PRE: 78 %
DLCOUNC-PRE: 16.6 ML/MIN/MMHG
DLCOUNC-PRED: 21.08 ML/MIN/MMHG
ERV-%PRED-PRE: 99 %
ERV-PRE: 0.91 L
ERV-PRED: 0.92 L
EXPTIME-PRE: 9.43 SEC
FEF2575-%PRED-PRE: 22 %
FEF2575-PRE: 0.55 L/SEC
FEF2575-PRED: 2.41 L/SEC
FEFMAX-%PRED-PRE: 54 %
FEFMAX-PRE: 3.59 L/SEC
FEFMAX-PRED: 6.55 L/SEC
FEV1-%PRED-PRE: 53 %
FEV1-PRE: 1.42 L
FEV1FEV6-PRE: 53 %
FEV1FEV6-PRED: 81 %
FEV1FVC-PRE: 47 %
FEV1FVC-PRED: 79 %
FEV1SVC-PRE: 48 %
FEV1SVC-PRED: 77 %
FIFMAX-PRE: 3.55 L/SEC
FRCPLETH-%PRED-PRE: 156 %
FRCPLETH-PRE: 4.33 L
FRCPLETH-PRED: 2.76 L
FVC-%PRED-PRE: 89 %
FVC-PRE: 2.99 L
FVC-PRED: 3.34 L
IC-%PRED-PRE: 81 %
IC-PRE: 2.05 L
IC-PRED: 2.52 L
RVPLETH-%PRED-PRE: 178 %
RVPLETH-PRE: 3.41 L
RVPLETH-PRED: 1.92 L
TLCPLETH-%PRED-PRE: 124 %
TLCPLETH-PRE: 6.38 L
TLCPLETH-PRED: 5.11 L
VA-%PRED-PRE: 99 %
VA-PRE: 5.01 L
VC-%PRED-PRE: 86 %
VC-PRE: 2.97 L
VC-PRED: 3.44 L

## 2019-07-09 ENCOUNTER — TELEPHONE (OUTPATIENT)
Dept: FAMILY MEDICINE | Facility: CLINIC | Age: 59
End: 2019-07-09

## 2019-07-09 NOTE — LETTER
July 9, 2019    Georgette Pereira  [address]  [place] [address]    Dear Georgette    We care about your health and have reviewed your health plan. We have reviewed your medical conditions, medication list, and lab results and are making recommendations based on this review, to better manage your health.    You are in particular need of attention regarding:  - Scheduling a Breast Cancer Screening (Mammography) [phone]      Here is a list of Health Maintenance topics that are due now or due soon:  Health Maintenance Due   Topic Date Due     PREVENTIVE CARE VISIT  1960     URINE DRUG SCREEN  1960     DEPRESSION ACTION PLAN  1960     ZOSTER IMMUNIZATION (1 of 2) 10/11/2010     MAMMO SCREENING  08/13/2017     We will be calling you in the next couple of weeks to help you schedule any appointments that are needed.  Please call us at [phone] (or use [employer]) to address the above recommendations.     Thank you for trusting [facility] and we appreciate the opportunity to serve you.  We look forward to supporting your healthcare needs in the future.    Healthy Regards,    Dr. Bermudez/cw  Team 1

## 2019-07-09 NOTE — TELEPHONE ENCOUNTER
Panel Management Review      Patient has the following on her problem list:       Composite cancer screening  Chart review shows that this patient is due/due soon for the following Mammogram  Summary:    Patient is due/failing the following:   MAMMOGRAM    Action needed:   Due for a mammogram     Type of outreach:    Sent letter.    Questions for provider review:    None                                                                                                                                    Lisa Perdue ma       Chart routed to Care Team .

## 2019-08-19 ENCOUNTER — ANCILLARY PROCEDURE (OUTPATIENT)
Dept: CT IMAGING | Facility: CLINIC | Age: 59
End: 2019-08-19
Attending: INTERNAL MEDICINE
Payer: COMMERCIAL

## 2019-08-19 ENCOUNTER — ANCILLARY PROCEDURE (OUTPATIENT)
Dept: CARDIOLOGY | Facility: CLINIC | Age: 59
End: 2019-08-19
Attending: INTERNAL MEDICINE
Payer: COMMERCIAL

## 2019-08-19 DIAGNOSIS — R06.02 SHORTNESS OF BREATH: ICD-10-CM

## 2019-08-19 DIAGNOSIS — Z87.891 PERSONAL HISTORY OF TOBACCO USE, PRESENTING HAZARDS TO HEALTH: ICD-10-CM

## 2019-08-19 DIAGNOSIS — J44.9 CHRONIC OBSTRUCTIVE PULMONARY DISEASE, UNSPECIFIED COPD TYPE (H): ICD-10-CM

## 2019-08-19 PROCEDURE — 93306 TTE W/DOPPLER COMPLETE: CPT | Performed by: INTERNAL MEDICINE

## 2019-08-19 PROCEDURE — 71250 CT THORAX DX C-: CPT | Performed by: RADIOLOGY

## 2019-09-20 DIAGNOSIS — I10 ESSENTIAL HYPERTENSION WITH GOAL BLOOD PRESSURE LESS THAN 140/90: Chronic | ICD-10-CM

## 2019-09-20 RX ORDER — AMLODIPINE BESYLATE 5 MG/1
TABLET ORAL
Qty: 90 TABLET | Refills: 1 | Status: SHIPPED | OUTPATIENT
Start: 2019-09-20 | End: 2021-06-01

## 2019-09-20 NOTE — TELEPHONE ENCOUNTER
"Requested Prescriptions   Pending Prescriptions Disp Refills     amLODIPine (NORVASC) 5 MG tablet [Pharmacy Med Name: AMLODIPINE BESYLATE 5MG TABLETS]  Last Written Prescription Date:  9/17/18  Last Fill Quantity: 90,  # refills: 1   Last office visit: 3/28/2019 with prescribing provider:  Lara   Future Office Visit:   Next 5 appointments (look out 90 days)    Dec 12, 2019  3:00 PM CST  SHORT with PFT LAB  [facility]) [id number] [place] [phone]  [phone]   Dec 12, 2019  3:30 PM CST  Return Visit with Lizeth Decker MD  [facility]) [address] [place] [id number]  [phone]          90 tablet 0     Sig: TAKE 1 TABLET(5 MG) BY MOUTH DAILY       Calcium Channel Blockers Protocol  Failed - 9/20/2019  2:56 PM        Failed - Blood pressure under 140/90 in past 12 months     BP Readings from Last 3 Encounters:   06/18/19 (!) 153/101   03/28/19 (!) 161/101   11/28/17 133/89                 Failed - Normal serum creatinine on file in past 12 months     Recent Labs   Lab Test 03/28/19  1701   CR 1.06*             Passed - Recent (12 mo) or future (30 days) visit within the authorizing provider's specialty     Patient had office visit in the last 12 months or has a visit in the next 30 days with authorizing provider or within the authorizing provider's specialty.  See \"Patient Info\" tab in inbasket, or \"Choose Columns\" in Meds & Orders section of the refill encounter.              Passed - Medication is active on med list        Passed - Patient is age 18 or older        Passed - No active pregnancy on record        Passed - No positive pregnancy test in past 12 months          "

## 2019-09-20 NOTE — TELEPHONE ENCOUNTER
Routing refill request to provider for review/approval because:  Failed protocol.  Carmen Webb RN,BSN  [facility]

## 2019-09-28 ENCOUNTER — HEALTH MAINTENANCE LETTER (OUTPATIENT)
Age: 59
End: 2019-09-28

## 2019-10-14 ENCOUNTER — TELEPHONE (OUTPATIENT)
Dept: FAMILY MEDICINE | Facility: CLINIC | Age: 59
End: 2019-10-14

## 2019-10-14 NOTE — TELEPHONE ENCOUNTER
Panel Management Review  Summary:    Type of outreach:    No answer, Final letter mailed.    Encounter routed to No Action Needed.                                                                                                                               Lisa Perdue ma     Panel Management Review  Summary:    Type of outreach:    NO answer, LM to call back.    Encounter routed to No Action Needed.                                                                                                                               Lisa Perdue ma       Panel Management Review      Patient has the following on her problem list:       Composite cancer screening  Chart review shows that this patient is due/due soon for the following Mammogram  Summary:    Patient is due/failing the following:   MAMMOGRAM    Action needed:   Due for a mammogram    Type of outreach:    Sent letter.    Questions for provider review:    None                                                                                                                                    Lisa Perdue ma       Chart routed to Care Team .

## 2019-10-14 NOTE — LETTER
October 14, 2019    Georgette Pereira  [address]  [place] [address]    Dear Georgette    We care about your health and have reviewed your health plan. We have reviewed your medical conditions, medication list, and lab results and are making recommendations based on this review, to better manage your health.    You are in particular need of attention regarding:  - Scheduling a Breast Cancer Screening (Mammography) [phone]      Here is a list of Health Maintenance topics that are due now or due soon:  Health Maintenance Due   Topic Date Due     PREVENTIVE CARE VISIT  1960     URINE DRUG SCREEN  1960     DEPRESSION ACTION PLAN  1960     ZOSTER IMMUNIZATION (1 of 2) 10/11/2010     MAMMO SCREENING  08/13/2017     INFLUENZA VACCINE (1) 09/01/2019     PHQ-9  11/06/2019       Please call us at [phone] (or use Secret) to address the above recommendations. If we do not hear from you in the next couple of weeks we will be reaching out to you again.    Thank you for trusting [facility] and we appreciate the opportunity to serve you.  We look forward to supporting your healthcare needs in the future.    Healthy Regards,    Dr. Bermudez/cw  Team 1

## 2019-10-14 NOTE — LETTER
October 23, 2019    Georgette Pereira  [address]  [place] [address]      Dear Georgette Pereira,     We have tried to contact you about your health, but have been unable to reach you.  Please call us as soon as possible so we can provide you with the best care possible.  We will continue to check in with you throughout the year to complete these items of care, if you are not able to complete these items at this time.  If you would like to complete the missing items for your care, please contact us at [phone].    We recommend the following:  -schedule a MAMMOGRAM 1 in 8 women will develop invasive breast cancer during her lifetime and it is the most common non-skin cancer in American women.  EARLY detection, new treatments, and a better understanding of the disease have increased survival rates - the 5 year survival rate in the 1960s was 63% and today it is close to 90% .  Please disregard this reminder if you have had this exam elsewhere within the last year.  It would be helpful for us to have a copy of your mammogram report in our file so that we can best coordinate your care - please contact us with when your test was done so we can update your record. Please call [phone] to schedule your mammogram today.         Sincerely,     Your Care Team at [place]  Team 1

## 2020-01-28 ENCOUNTER — TELEPHONE (OUTPATIENT)
Dept: FAMILY MEDICINE | Facility: CLINIC | Age: 60
End: 2020-01-28

## 2020-01-28 NOTE — LETTER
February 5, 2020    Georgette Pereira  [address]  [place] [address]      Dear Georgette Pereira,     We have tried to contact you about your health, but have been unable to reach you.  Please call us as soon as possible so we can provide you with the best care possible.  We will continue to check in with you throughout the year to complete these items of care, if you are not able to complete these items at this time.  If you would like to complete the missing items for your care, please contact us at [phone].    We recommend the following:  -schedule a NURSE-ONLY BLOOD PRESSURE APPOINTMENT within the next 1-4 weeks.  -schedule a MAMMOGRAM 1 in 8 women will develop invasive breast cancer during her lifetime and it is the most common non-skin cancer in American women.  EARLY detection, new treatments, and a better understanding of the disease have increased survival rates - the 5 year survival rate in the 1960s was 63% and today it is close to 90% .  Please disregard this reminder if you have had this exam elsewhere within the last year.  It would be helpful for us to have a copy of your mammogram report in our file so that we can best coordinate your care - please contact us with when your test was done so we can update your record. Please call [phone] to schedule your mammogram today.     Please complete the PHQ9  questionnaire and mail back to the clinic in the self addressed envelope provided, thank you.    Enclosed with this letter is a questionnaire about depression for your upcoming visit. Please fill this out and mail back or contact us. We care about you and want to make sure you get the best care possible. If at any time you feel unsafe or have concerns about safety of others please take  immediate action by calling [phone], for Mental Health Crisis phone support 24 hours a day, 365 days per year. As always, you can also go the your local Emergency Room, or  call 911 if you have immediate safety concerns.      Sincerely,     Your Care Team at [place]

## 2020-01-28 NOTE — LETTER
January 28, 2020    Georgette Pereira  [address]  [place] [address]    Dear Georgette    We care about your health and have reviewed your health plan. We have reviewed your medical conditions, medication list, and lab results and are making recommendations based on this review, to better manage your health.    You are in particular need of attention regarding:  - Your Depression  - Your High Blood Pressure, Please call to schedule an appointment with your provider or nurse  - Scheduling a Breast Cancer Screening (Mammography) [phone]    Please complete the PHQ9  questionnaire and mail back to the clinic in the self addressed envelope provided, thank you.    Enclosed with this letter is a questionnaire about depression for your upcoming visit. Please fill this out and mail back or contact us. We care about you and want to make sure you get the best care possible. If at any time you feel unsafe or have concerns about safety of others please take  immediate action by calling [phone], for Mental Health Crisis phone support 24 hours a day, 365 days per year. As always, you can also go the your local Emergency Room, or call 911 if you have immediate safety concerns.    Here is a list of Health Maintenance topics that are due now or due soon:  Health Maintenance Due   Topic Date Due     PREVENTIVE CARE VISIT  1960     URINE DRUG SCREEN  1960     DEPRESSION ACTION PLAN  1960     ZOSTER IMMUNIZATION (1 of 2) 10/11/2010     MAMMO SCREENING  08/13/2017     INFLUENZA VACCINE (1) 09/01/2019     PHQ-9  11/06/2019       Please call us at [phone] (or use Andre Phillipe) to address the above recommendations. If we do not hear from you in the next couple of weeks we will be reaching out to you again.    Thank you for trusting [facility] and we appreciate the opportunity to serve you.  We look forward to supporting your healthcare needs in the  future.    Healthy Regards,    Dr. Bermudez

## 2020-01-28 NOTE — TELEPHONE ENCOUNTER
Panel Management Review      Patient has the following on her problem list:     Hypertension   Last three blood pressure readings:  BP Readings from Last 3 Encounters:   06/18/19 (!) 153/101   03/28/19 (!) 161/101   11/28/17 133/89     Blood pressure: FAILED    HTN Guidelines:  Less than 140/90      Composite cancer screening  Chart review shows that this patient is due/due soon for the following Mammogram  Summary:    Patient is due/failing the following:   BP CHECK, MAMMOGRAM and PHQ9    Action needed:   Patient needs office visit for BP recheck and mammogram and phq-9.    Type of outreach:    Sent letter.    Questions for provider review:    None                                                                                                                                    Lisa Perdue ma       Chart routed to Care Team .

## 2020-02-04 NOTE — TELEPHONE ENCOUNTER
Panel Management Review  Summary:    Type of outreach:    Phone, left message for patient to call back.     Encounter routed to Care Team.                                                                                                                               Lisa Perdue ma

## 2020-02-05 NOTE — TELEPHONE ENCOUNTER
Panel Management Review  Summary:    Type of outreach:    No answer, Final letter mailed.    Encounter routed to No Action Needed.                                                                                                                               Lisa Perdue ma

## 2020-02-10 ASSESSMENT — PATIENT HEALTH QUESTIONNAIRE - PHQ9: SUM OF ALL RESPONSES TO PHQ QUESTIONS 1-9: 19

## 2020-02-10 NOTE — TELEPHONE ENCOUNTER
Attempt #1 to call patient.     Patient did not answer, RN left voicemail at home number. RN requested patient return call to Nurse Triage line at [phone].     Last OV 3/2019: Return to clinic one month recheck BP (call in a couple weeks if BP still over 150/90 to consider adding norvasc prior to our visit)    Patient did not follow up. Per snapshot, no diagnosis or medications for depression currently.     Nasra Choudhary, RN, BSN, PHN  [facility]: [place]

## 2020-02-11 NOTE — TELEPHONE ENCOUNTER
Attempt #2 to call patient.      Patient did not answer, RN left voicemail at home number. RN requested patient return call to Nurse Triage line at [phone].    Emelina Rivera RN on 2/11/2020 at 10:47 AM

## 2020-02-12 NOTE — TELEPHONE ENCOUNTER
Attempt #3 to call patient.     Patient did not answer, RN left voicemail at home number. RN requested patient return call to Nurse Triage line at [phone].     TC, please send letter      Nasra Choudhary RN, BSN, PHN  [facility]: [place]

## 2020-04-01 ENCOUNTER — TELEPHONE (OUTPATIENT)
Dept: PULMONOLOGY | Facility: CLINIC | Age: 60
End: 2020-04-01

## 2020-04-01 NOTE — TELEPHONE ENCOUNTER
Writer attempted to reach the patient to change the appointment scheduled for 4/9/2020 with Dr. Decker from in clinic visit to telephone visit per Dr. Blas; lvm.    Cancelled PFT per .    [facility]  Adult Med Spec/Surg Spec   [phone]

## 2020-04-06 NOTE — TELEPHONE ENCOUNTER
DANIELE for patient requesting her to call back to discuss switching her appointment to a telephone or video visit. Asked her to call back to discuss.   Michael Powers RN   Medical Specialty Clinic Care Coordinator  [facility]

## 2020-04-09 ENCOUNTER — VIRTUAL VISIT (OUTPATIENT)
Dept: PULMONOLOGY | Facility: CLINIC | Age: 60
End: 2020-04-09

## 2020-04-09 DIAGNOSIS — J44.9 CHRONIC OBSTRUCTIVE PULMONARY DISEASE, UNSPECIFIED COPD TYPE (H): Primary | ICD-10-CM

## 2020-04-09 DIAGNOSIS — R91.8 PULMONARY NODULES: ICD-10-CM

## 2020-04-09 DIAGNOSIS — Z87.891 PERSONAL HISTORY OF TOBACCO USE, PRESENTING HAZARDS TO HEALTH: ICD-10-CM

## 2020-04-09 DIAGNOSIS — R06.02 SHORTNESS OF BREATH: ICD-10-CM

## 2020-04-09 PROCEDURE — 99213 OFFICE O/P EST LOW 20 MIN: CPT | Mod: TEL | Performed by: INTERNAL MEDICINE

## 2020-04-09 RX ORDER — NICOTINE 21 MG/24HR
1 PATCH, TRANSDERMAL 24 HOURS TRANSDERMAL EVERY 24 HOURS
Qty: 28 PATCH | Refills: 0 | Status: CANCELLED | OUTPATIENT
Start: 2020-04-09

## 2020-04-09 NOTE — Clinical Note
1) CT chest ordered for August 2021  2) Can you please call this patient's pharmacy and find out which inhalers she is filling and when they were last filled?  LEt me know what you find out.  Thanks.

## 2020-04-09 NOTE — PROGRESS NOTES
"Georgette Pereira is a 59 year old female who is being evaluated via a billable telephone visit.      The patient has been notified of following:     \"This telephone visit will be conducted via a call between you and your physician/provider. We have found that certain health care needs can be provided without the need for a physical exam.  This service lets us provide the care you need with a short phone conversation.  If a prescription is necessary we can send it directly to your pharmacy.  If lab work is needed we can place an order for that and you can then stop by our lab to have the test done at a later time.    Telephone visits are billed at different rates depending on your insurance coverage. During this emergency period, for some insurers they may be billed the same as an in-person visit.  Please reach out to your insurance provider with any questions.    If during the course of the call the physician/provider feels a telephone visit is not appropriate, you will not be charged for this service.\"    Patient has given verbal consent for Telephone visit?  Yes    How would you like to obtain your AVS? Mail a copy    Georgette Pereira complains of    Chief Complaint   Patient presents with     RECHECK     COPD follow up - Increased SOB w/ activity.        I have reviewed and updated the patient's Past Medical History, Social History, Family History and Medication List.    ALLERGIES  Buspirone; Lisinopril; and Vicodin [hydrocodone-acetaminophen]     Steven Grant LPN    Rheumatology / Pulmonology  [facility]      Additional provider notes:     59 yof with history of COPD and ongoing tobacco abuse.  We last visited in June 2019 for evaluation of shortness of breath.  PFTs at that visit were notable for moderately severe obstruction w/ FEV1 53%.  Lung function had declined compared with previous studies from 2015 owing to ongoing tobacco abuse.  TTE obtained for evaluation of cardiac causes " was essentially normal.  There was comment of increased LV intracavitary gradient without overt evidence of hypertrophic cardiomyopathy.  This finding was further discussed with cardiology by one of my colleagues while I was on family leave, and cardiology did not recommend any further work up.  CT Chest was also obtained and notable for mild-moderate emphysema and a 2 mm pulmonary nodule.    -Increased shortness of breath since our last visit -- gets short of breath with activity such as drying hair, unloading groceries, needs to stop and rest -- started to get more noticeable since August  -no cough, sputum production, hemoptysis  -weight is increasing, up to 145 lbs, about 25 lbs increase over 2 years  -albuterol as needed -- twice per day, maybe 3x per day -- helps a little  -Initially states that she couldn't afford spiriva so never filled it, then stated she has it stockpiled at home and is taking daily.  -Has symbicort, high dose formulation, but hasn't been using consistently, unsure of benefit.    -using e-cigs, 2 puffs per day  -no second hand smoke exposure (previous roommate moved out)  -nebs twice daily, using albuterol -- somewhat helpful  -no episodes of pneumonia or AECOPD since our last visit    Assessment and Plan:    1) Smoking Cessation. We discussed that the only way to prevent further decline in lung function would be to quit smoking all together.  Complete cessation from all tobacco products was strongly encouraged. She was congratulated on cutting back significantly.  A refill for low dose nicotine patches was sent to her pharmacy at her request.    2) COPD.  Last PFTs show moderately severe obstruction. Ms. Pereira reported somewhat conflicting information regarding inhaler regimen and usage.  On repeated clarification, she stated she was taking spiriva and albuterol mostly as prescribed with intermittent symbicort use.  Ideally, she would be on triple therapy with LABA/LAMA/ICS  consistently.  I did offer to trial a switch to Trelegy to simplify things for her.  She preferred to stick with spiriva and symbicort. She has not historically had problems with recurrent pneumonia or AECOPD so there is no indication for addition of agents which would be targeted at preventing exacerbation recurrence.        3) Dyspnea.  Work up including CT Chest and TTE were obtained in August 2019 for progressive dyspnea.  Both returned essentially unremarkable and did not elucidate any alternative etiologies of dyspnea outside of known COPD.  I think that COPD is the most likely etiology of her dyspnea.  I do think she would be an excellent candidate for pulmonary rehab and have placed an order for this today.  Initiation of the program will of course be on hold until after the COVID pandemic.     4) Pulmonary nodules/ Lung cancer screening.  Based on age and smoking history she probably does qualify for lung ca screening.  Last CT from august showed a small 2 mm pulmonary nodule.  Repeat CT for surveillance/annual screening would be due this August.     Return to clinic in 1 year, sooner if worsening symptoms.    Phone call duration: 17 minutes    Lizeth Decker MD    ### Addendum:  Called pharmacy to inquire about which prescriptions have been filled and when. According to the [facility] Pharmacist, pt has Rxs for Dulera and Spiriva waiting there. Pt has never picked them up. Pharmacist also said that this pt has not filled any inhaler Rxs with any other [facility] in [place] since 2018.  Will ask nursing staff to call patient remind her to fill/ prescriptions.

## 2020-04-10 ENCOUNTER — PATIENT OUTREACH (OUTPATIENT)
Dept: PULMONOLOGY | Facility: CLINIC | Age: 60
End: 2020-04-10

## 2020-04-10 NOTE — PROGRESS NOTES
Notified patient that she has Dulera and Spiriva available at the [facility] pharmacy. Dr. Decker would like her to have both filled. Asked that patient to call back with questions.     Michael Powers RN   Medical Specialty Clinic Care Coordinator  [facility]

## 2020-04-11 DIAGNOSIS — I10 ESSENTIAL HYPERTENSION WITH GOAL BLOOD PRESSURE LESS THAN 140/90: Chronic | ICD-10-CM

## 2020-04-13 RX ORDER — LOSARTAN POTASSIUM AND HYDROCHLOROTHIAZIDE 25; 100 MG/1; MG/1
1 TABLET ORAL DAILY
Qty: 90 TABLET | Refills: 1 | Status: SHIPPED | OUTPATIENT
Start: 2020-04-13 | End: 2021-06-01

## 2020-04-13 RX ORDER — METOPROLOL SUCCINATE 100 MG/1
TABLET, EXTENDED RELEASE ORAL
Qty: 90 TABLET | Refills: 3 | Status: SHIPPED | OUTPATIENT
Start: 2020-04-13 | End: 2021-06-01

## 2020-06-30 PROBLEM — N18.30 CKD (CHRONIC KIDNEY DISEASE) STAGE 3, GFR 30-59 ML/MIN (H): Status: ACTIVE | Noted: 2020-06-30

## 2020-12-03 ENCOUNTER — MYC MEDICAL ADVICE (OUTPATIENT)
Dept: PULMONOLOGY | Facility: CLINIC | Age: 60
End: 2020-12-03

## 2020-12-03 NOTE — LETTER
December 17, 2020        Georgette Pereira  [address]  [place] [address]              Dear Georgette,    You are due for follow up in the pulmonary clinic in April 2021. You last saw Dr. Decker for pulmonary follow up. Dr. Decker has taken a new leadership role within [facility] and will no longer be seeing patients in [place]. If you wish to continue your care with Dr. Decker, you will need to see her at the [facility] in [place]. You can call [phone] to schedule.      If you wish to continue your care in [place], Dr. Lucia Reyes has joined our pulmonary clinic and is available for appointments on Mondays, Wednesdays and Thursdays. Please note that due to COVID restrictions, virtual appointments may only be available at this time. This can change at any time; we continue to adjust our care model as the situation with the pandemic changes. Please contact our office to schedule your appointment with Dr. Reyes at [phone].      You are also due for a Chest CT prior to your appointment.      Please feel free to reach out to our office with questions or concerns.      Sincerely,      [facility] Pulmonary Team

## 2021-01-09 ENCOUNTER — HEALTH MAINTENANCE LETTER (OUTPATIENT)
Age: 61
End: 2021-01-09

## 2021-04-21 ENCOUNTER — MYC MEDICAL ADVICE (OUTPATIENT)
Dept: INTERNAL MEDICINE | Facility: CLINIC | Age: 61
End: 2021-04-21

## 2021-05-31 PROBLEM — N18.31 CHRONIC KIDNEY DISEASE, STAGE 3A (H): Status: ACTIVE | Noted: 2021-05-31

## 2021-06-01 ENCOUNTER — VIRTUAL VISIT (OUTPATIENT)
Dept: INTERNAL MEDICINE | Facility: CLINIC | Age: 61
End: 2021-06-01
Payer: COMMERCIAL

## 2021-06-01 DIAGNOSIS — R06.02 SHORTNESS OF BREATH: ICD-10-CM

## 2021-06-01 DIAGNOSIS — N18.31 CHRONIC KIDNEY DISEASE, STAGE 3A (H): ICD-10-CM

## 2021-06-01 DIAGNOSIS — R91.8 PULMONARY NODULES: ICD-10-CM

## 2021-06-01 DIAGNOSIS — J44.9 CHRONIC OBSTRUCTIVE PULMONARY DISEASE, UNSPECIFIED COPD TYPE (H): ICD-10-CM

## 2021-06-01 DIAGNOSIS — J44.9 COPD, MODERATE (H): ICD-10-CM

## 2021-06-01 DIAGNOSIS — I73.9 PAD (PERIPHERAL ARTERY DISEASE) (H): ICD-10-CM

## 2021-06-01 DIAGNOSIS — R79.89 LOW VITAMIN B12 LEVEL: ICD-10-CM

## 2021-06-01 DIAGNOSIS — Z87.891 PERSONAL HISTORY OF TOBACCO USE, PRESENTING HAZARDS TO HEALTH: ICD-10-CM

## 2021-06-01 DIAGNOSIS — N95.2 ATROPHIC VAGINITIS: ICD-10-CM

## 2021-06-01 DIAGNOSIS — Z12.31 ENCOUNTER FOR SCREENING MAMMOGRAM FOR BREAST CANCER: ICD-10-CM

## 2021-06-01 DIAGNOSIS — I10 ESSENTIAL HYPERTENSION WITH GOAL BLOOD PRESSURE LESS THAN 140/90: Primary | ICD-10-CM

## 2021-06-01 PROCEDURE — 99214 OFFICE O/P EST MOD 30 MIN: CPT | Mod: 95 | Performed by: INTERNAL MEDICINE

## 2021-06-01 RX ORDER — ROSUVASTATIN CALCIUM 10 MG/1
10 TABLET, COATED ORAL DAILY
Qty: 90 TABLET | Refills: 3 | Status: SHIPPED | OUTPATIENT
Start: 2021-06-01 | End: 2021-07-22

## 2021-06-01 RX ORDER — TRIAMCINOLONE ACETONIDE 1 MG/G
CREAM TOPICAL 2 TIMES DAILY
Qty: 80 G | Refills: 1 | Status: SHIPPED | OUTPATIENT
Start: 2021-06-01 | End: 2021-12-09

## 2021-06-01 RX ORDER — LOSARTAN POTASSIUM AND HYDROCHLOROTHIAZIDE 25; 100 MG/1; MG/1
1 TABLET ORAL DAILY
Qty: 90 TABLET | Refills: 1 | Status: SHIPPED | OUTPATIENT
Start: 2021-06-01 | End: 2021-12-09

## 2021-06-01 RX ORDER — METOPROLOL SUCCINATE 50 MG/1
50 TABLET, EXTENDED RELEASE ORAL DAILY
Qty: 90 TABLET | Refills: 3 | Status: SHIPPED | OUTPATIENT
Start: 2021-06-01 | End: 2022-05-18

## 2021-06-01 RX ORDER — ALBUTEROL SULFATE 90 UG/1
2 AEROSOL, METERED RESPIRATORY (INHALATION) EVERY 6 HOURS PRN
Qty: 18 G | Refills: 4 | Status: SHIPPED | OUTPATIENT
Start: 2021-06-01 | End: 2022-05-05

## 2021-06-01 ASSESSMENT — PATIENT HEALTH QUESTIONNAIRE - PHQ9: SUM OF ALL RESPONSES TO PHQ QUESTIONS 1-9: 20

## 2021-06-01 NOTE — PROGRESS NOTES
Amie is a 60 year old who is being evaluated via a billable telephone visit.    What phone number would you like to be contacted at? [phone]  How would you like to obtain your AVS? Amanda    Assessment & Plan     Essential hypertension with goal blood pressure less than 140/90    Untreated.   Will resume her previous Hyzaar, 1/2 of previous metoprolol... consider restart norvasc at next visit.     - losartan-hydrochlorothiazide (HYZAAR) 100-25 MG tablet; Take 1 tablet by mouth daily  - metoprolol succinate ER (TOPROL-XL) 50 MG 24 hr tablet; Take 1 tablet (50 mg) by mouth daily  - TSH with free T4 reflex; Future  - Lipid panel reflex to direct LDL Fasting; Future    Pulmonary nodules  Overdue to recheck  - CT Chest w/o Contrast; Future    COPD, moderate (H)   out of inhalers:  Resume.     - mometasone-formoterol (DULERA) 200-5 MCG/ACT inhaler; Inhale 2 puffs into the lungs 2 times daily  - albuterol (PROAIR HFA/PROVENTIL HFA/VENTOLIN HFA) 108 (90 Base) MCG/ACT inhaler; Inhale 2 puffs into the lungs every 6 hours as needed for shortness of breath / dyspnea or wheezing    Chronic kidney disease, stage 3a     Recheck.  Get labs after meds started.   - Basic metabolic panel; Future    Chronic obstructive pulmonary disease, unspecified COPD type (H)     - mometasone-formoterol (DULERA) 200-5 MCG/ACT inhaler; Inhale 2 puffs into the lungs 2 times daily    Personal history of tobacco use, presenting hazards to health   need to reassess this at f/u.   - mometasone-formoterol (DULERA) 200-5 MCG/ACT inhaler; Inhale 2 puffs into the lungs 2 times daily    Shortness of breath     - mometasone-formoterol (DULERA) 200-5 MCG/ACT inhaler; Inhale 2 puffs into the lungs 2 times daily  - CBC with platelets; Future    PAD (peripheral artery disease) (H)    She has ameurosis fugax symptoms, will get carotid us.   Treat cholesterol   Treat BP.     - rosuvastatin (CRESTOR) 10 MG tablet; Take 1 tablet (10 mg) by mouth daily  - US  "Carotid Bilateral; Future    Encounter for screening mammogram for breast cancer     - *MA Screening Digital Bilateral; Future    Atrophic vaginitis  Triamcinolone.   Reassess at upcoming AFE     LAter entry:  B12 low normal in past... will see if I can add on B12        30 minutes spent on the date of the encounter doing chart review, history and exam, documentation and further activities per the note       Depression Screening Follow Up    PHQ 6/1/2021   PHQ-9 Total Score 20   Q9: Thoughts of better off dead/self-harm past 2 weeks Not at all         Follow Up Actions Taken  Follow up recommended: has appt with me in 2 weeks.   motivated to f/u          Return in about 17 days (around 6/18/2021) for Physical Exam, BP Recheck, f/u labs and imaging.. .    Yue Bermudez MD  [facility]    +++++++++++++++++++++++++++++++++++++++++    Subjective   Amie is a 60 year old who presents for the following health issues     59 y/o F h/o COPD, HTN, DJD, pulmonary nodule (2mm solid LLL, 8/2019), CKD3a     BP at home are 190/110.  Pretty high.  Having headache.  Once she had some sight loss from L eye for 20\" on 3 separate occasions.       She is getting her COVID vaccine series, first dose was yesterday.      Has vaginal itching. ..      HPI   Hypertension Follow-up    Do you check your blood pressure regularly outside of the clinic? Yes     Are you following a low salt diet? Yes    Are your blood pressures ever more than 140 on the top number (systolic) OR more   than 90 on the bottom number (diastolic), for example 140/90? Yes      How many servings of fruits and vegetables do you eat daily?  0-1    On average, how many sweetened beverages do you drink each day (Examples: soda, juice, sweet tea, etc.  Do NOT count diet or artificially sweetened beverages)?   2 - cans of pop     How many days per week do you exercise enough to make your heart beat faster? 3 or less    How many minutes a day do you " exercise enough to make your heart beat faster? 10 - 19    How many days per week do you miss taking your medication? 0        Review of Systems         Objective           Vitals:  No vitals were obtained today due to virtual visit.    Physical Exam   healthy, alert and no distress  PSYCH: Alert and oriented times 3; coherent speech, normal   rate and volume, able to articulate logical thoughts, able   to abstract reason, no tangential thoughts, no hallucinations   or delusions  Her affect is normal and pleasant  RESP: No cough, no audible wheezing, able to talk in full sentences  Remainder of exam unable to be completed due to telephone visits                 Phone call duration: 25 minutes

## 2021-06-01 NOTE — PATIENT INSTRUCTIONS
Schedule fasting labs about 1 -2 days prior to our visit    Call to schedule imaging     Carotid ultrasound and Mammogram.   CT chest...     For BP:  Restart losartan/HCTZ and Metroprolol    Inhalers re ordered.       For Cholesterol:  Rosuvastatin.

## 2021-06-10 ENCOUNTER — ANCILLARY PROCEDURE (OUTPATIENT)
Dept: ULTRASOUND IMAGING | Facility: CLINIC | Age: 61
End: 2021-06-10
Attending: INTERNAL MEDICINE
Payer: COMMERCIAL

## 2021-06-10 ENCOUNTER — ANCILLARY PROCEDURE (OUTPATIENT)
Dept: CT IMAGING | Facility: CLINIC | Age: 61
End: 2021-06-10
Attending: INTERNAL MEDICINE
Payer: COMMERCIAL

## 2021-06-10 DIAGNOSIS — R91.8 PULMONARY NODULES: ICD-10-CM

## 2021-06-10 DIAGNOSIS — I73.9 PAD (PERIPHERAL ARTERY DISEASE) (H): ICD-10-CM

## 2021-06-10 PROCEDURE — 71250 CT THORAX DX C-: CPT | Mod: TC | Performed by: RADIOLOGY

## 2021-06-10 PROCEDURE — 93880 EXTRACRANIAL BILAT STUDY: CPT | Performed by: RADIOLOGY

## 2021-06-11 NOTE — RESULT ENCOUNTER NOTE
Georgette Pereira    Ultrasound demonstrates mild-moderate cholesterol build up on the neck arteries.  Daily aspirin and statin (Lipitor) are important medical treatments for this.      See you next week, we can review more face to face.     Sincerely,       MENDEZ RUFFIN M.D.     SEND NOTE

## 2021-06-11 NOTE — RESULT ENCOUNTER NOTE
Georgette Pereira    Your low dose CT imaging shows some benign small nodules.  No new concerning lesions.   We can repeat this yearly if you like.       Best,       MENDEZ RUFFIN M.D.

## 2021-06-15 DIAGNOSIS — N18.31 CHRONIC KIDNEY DISEASE, STAGE 3A (H): ICD-10-CM

## 2021-06-15 DIAGNOSIS — I10 ESSENTIAL HYPERTENSION WITH GOAL BLOOD PRESSURE LESS THAN 140/90: ICD-10-CM

## 2021-06-15 DIAGNOSIS — R06.02 SHORTNESS OF BREATH: ICD-10-CM

## 2021-06-15 LAB
ANION GAP SERPL CALCULATED.3IONS-SCNC: 3 MMOL/L (ref 3–14)
BUN SERPL-MCNC: 14 MG/DL (ref 7–30)
CALCIUM SERPL-MCNC: 9.3 MG/DL (ref 8.5–10.1)
CHLORIDE SERPL-SCNC: 103 MMOL/L (ref 94–109)
CHOLEST SERPL-MCNC: 191 MG/DL
CO2 SERPL-SCNC: 34 MMOL/L (ref 20–32)
CREAT SERPL-MCNC: 1.13 MG/DL (ref 0.52–1.04)
ERYTHROCYTE [DISTWIDTH] IN BLOOD BY AUTOMATED COUNT: 13 % (ref 10–15)
GFR SERPL CREATININE-BSD FRML MDRD: 53 ML/MIN/{1.73_M2}
GLUCOSE SERPL-MCNC: 99 MG/DL (ref 70–99)
HCT VFR BLD AUTO: 49.5 % (ref 35–47)
HDLC SERPL-MCNC: 55 MG/DL
HGB BLD-MCNC: 15.8 G/DL (ref 11.7–15.7)
LDLC SERPL CALC-MCNC: 88 MG/DL
MCH RBC QN AUTO: 29.6 PG (ref 26.5–33)
MCHC RBC AUTO-ENTMCNC: 31.9 G/DL (ref 31.5–36.5)
MCV RBC AUTO: 93 FL (ref 78–100)
NONHDLC SERPL-MCNC: 136 MG/DL
PLATELET # BLD AUTO: 321 10E9/L (ref 150–450)
POTASSIUM SERPL-SCNC: 3.7 MMOL/L (ref 3.4–5.3)
RBC # BLD AUTO: 5.34 10E12/L (ref 3.8–5.2)
SODIUM SERPL-SCNC: 140 MMOL/L (ref 133–144)
TRIGL SERPL-MCNC: 240 MG/DL
TSH SERPL DL<=0.005 MIU/L-ACNC: 1.73 MU/L (ref 0.4–4)
WBC # BLD AUTO: 8.2 10E9/L (ref 4–11)

## 2021-06-15 PROCEDURE — 84443 ASSAY THYROID STIM HORMONE: CPT | Performed by: INTERNAL MEDICINE

## 2021-06-15 PROCEDURE — 85027 COMPLETE CBC AUTOMATED: CPT | Performed by: INTERNAL MEDICINE

## 2021-06-15 PROCEDURE — 36415 COLL VENOUS BLD VENIPUNCTURE: CPT | Performed by: INTERNAL MEDICINE

## 2021-06-15 PROCEDURE — 80048 BASIC METABOLIC PNL TOTAL CA: CPT | Performed by: INTERNAL MEDICINE

## 2021-06-15 PROCEDURE — 80061 LIPID PANEL: CPT | Performed by: INTERNAL MEDICINE

## 2021-06-18 ENCOUNTER — OFFICE VISIT (OUTPATIENT)
Dept: INTERNAL MEDICINE | Facility: CLINIC | Age: 61
End: 2021-06-18
Payer: COMMERCIAL

## 2021-06-18 ENCOUNTER — ANCILLARY PROCEDURE (OUTPATIENT)
Dept: MAMMOGRAPHY | Facility: CLINIC | Age: 61
End: 2021-06-18
Attending: INTERNAL MEDICINE
Payer: COMMERCIAL

## 2021-06-18 VITALS
SYSTOLIC BLOOD PRESSURE: 132 MMHG | BODY MASS INDEX: 23.32 KG/M2 | OXYGEN SATURATION: 98 % | HEART RATE: 70 BPM | WEIGHT: 140 LBS | HEIGHT: 65 IN | DIASTOLIC BLOOD PRESSURE: 84 MMHG | TEMPERATURE: 98 F

## 2021-06-18 DIAGNOSIS — Z80.3 FAMILY HISTORY OF MALIGNANT NEOPLASM OF BREAST: ICD-10-CM

## 2021-06-18 DIAGNOSIS — F43.23 ADJUSTMENT DISORDER WITH MIXED ANXIETY AND DEPRESSED MOOD: ICD-10-CM

## 2021-06-18 DIAGNOSIS — G44.219 EPISODIC TENSION-TYPE HEADACHE, NOT INTRACTABLE: ICD-10-CM

## 2021-06-18 DIAGNOSIS — N95.2 ATROPHIC VAGINITIS: ICD-10-CM

## 2021-06-18 DIAGNOSIS — I73.9 PAD (PERIPHERAL ARTERY DISEASE) (H): ICD-10-CM

## 2021-06-18 DIAGNOSIS — R79.89 LOW VITAMIN B12 LEVEL: ICD-10-CM

## 2021-06-18 DIAGNOSIS — R91.8 PULMONARY NODULES: ICD-10-CM

## 2021-06-18 DIAGNOSIS — Z00.01 ENCOUNTER FOR GENERAL ADULT MEDICAL EXAMINATION WITH ABNORMAL FINDINGS: Primary | ICD-10-CM

## 2021-06-18 DIAGNOSIS — Z12.11 COLON CANCER SCREENING: ICD-10-CM

## 2021-06-18 DIAGNOSIS — I10 ESSENTIAL HYPERTENSION WITH GOAL BLOOD PRESSURE LESS THAN 140/90: ICD-10-CM

## 2021-06-18 DIAGNOSIS — M79.7 FIBROMYALGIA: ICD-10-CM

## 2021-06-18 DIAGNOSIS — Z87.891 PERSONAL HISTORY OF TOBACCO USE, PRESENTING HAZARDS TO HEALTH: ICD-10-CM

## 2021-06-18 DIAGNOSIS — Z80.0 FAMILY HISTORY OF COLON CANCER: ICD-10-CM

## 2021-06-18 DIAGNOSIS — J44.9 COPD, MODERATE (H): ICD-10-CM

## 2021-06-18 DIAGNOSIS — N18.31 CHRONIC KIDNEY DISEASE, STAGE 3A (H): ICD-10-CM

## 2021-06-18 DIAGNOSIS — Z12.31 ENCOUNTER FOR SCREENING MAMMOGRAM FOR BREAST CANCER: ICD-10-CM

## 2021-06-18 DIAGNOSIS — G45.3 AMAUROSIS FUGAX, LEFT EYE: ICD-10-CM

## 2021-06-18 LAB
ANION GAP SERPL CALCULATED.3IONS-SCNC: 4 MMOL/L (ref 3–14)
BUN SERPL-MCNC: 20 MG/DL (ref 7–30)
CALCIUM SERPL-MCNC: 9.5 MG/DL (ref 8.5–10.1)
CHLORIDE SERPL-SCNC: 102 MMOL/L (ref 94–109)
CO2 SERPL-SCNC: 32 MMOL/L (ref 20–32)
CREAT SERPL-MCNC: 1.22 MG/DL (ref 0.52–1.04)
GFR SERPL CREATININE-BSD FRML MDRD: 48 ML/MIN/{1.73_M2}
GLUCOSE SERPL-MCNC: 87 MG/DL (ref 70–99)
POTASSIUM SERPL-SCNC: 4 MMOL/L (ref 3.4–5.3)
SODIUM SERPL-SCNC: 138 MMOL/L (ref 133–144)
VIT B12 SERPL-MCNC: 296 PG/ML (ref 193–986)

## 2021-06-18 PROCEDURE — 77067 SCR MAMMO BI INCL CAD: CPT | Mod: TC | Performed by: RADIOLOGY

## 2021-06-18 PROCEDURE — 99214 OFFICE O/P EST MOD 30 MIN: CPT | Mod: 25 | Performed by: INTERNAL MEDICINE

## 2021-06-18 PROCEDURE — 99396 PREV VISIT EST AGE 40-64: CPT | Performed by: INTERNAL MEDICINE

## 2021-06-18 PROCEDURE — 82607 VITAMIN B-12: CPT | Performed by: INTERNAL MEDICINE

## 2021-06-18 PROCEDURE — 36415 COLL VENOUS BLD VENIPUNCTURE: CPT | Performed by: INTERNAL MEDICINE

## 2021-06-18 PROCEDURE — 80048 BASIC METABOLIC PNL TOTAL CA: CPT | Performed by: INTERNAL MEDICINE

## 2021-06-18 RX ORDER — DULOXETIN HYDROCHLORIDE 30 MG/1
30 CAPSULE, DELAYED RELEASE ORAL 2 TIMES DAILY
Qty: 180 CAPSULE | Refills: 3 | Status: SHIPPED | OUTPATIENT
Start: 2021-06-18 | End: 2022-07-11

## 2021-06-18 RX ORDER — ESTRADIOL 10 UG/1
10 INSERT VAGINAL
Qty: 24 TABLET | Refills: 4 | Status: SHIPPED | OUTPATIENT
Start: 2021-06-21 | End: 2023-10-19

## 2021-06-18 ASSESSMENT — ENCOUNTER SYMPTOMS
PALPITATIONS: 0
DIARRHEA: 0
MYALGIAS: 1
NAUSEA: 0
SHORTNESS OF BREATH: 1
NERVOUS/ANXIOUS: 1
CHILLS: 0
COUGH: 0
CONSTIPATION: 0
SORE THROAT: 0
HEMATOCHEZIA: 0
DYSURIA: 0
ABDOMINAL PAIN: 0
WEAKNESS: 1
HEARTBURN: 0
BREAST MASS: 0
HEMATURIA: 0
FEVER: 0
PARESTHESIAS: 0
HEADACHES: 1
ARTHRALGIAS: 1
FREQUENCY: 0
DIZZINESS: 0
EYE PAIN: 0
JOINT SWELLING: 0

## 2021-06-18 ASSESSMENT — PATIENT HEALTH QUESTIONNAIRE - PHQ9
SUM OF ALL RESPONSES TO PHQ QUESTIONS 1-9: 18
10. IF YOU CHECKED OFF ANY PROBLEMS, HOW DIFFICULT HAVE THESE PROBLEMS MADE IT FOR YOU TO DO YOUR WORK, TAKE CARE OF THINGS AT HOME, OR GET ALONG WITH OTHER PEOPLE: EXTREMELY DIFFICULT
SUM OF ALL RESPONSES TO PHQ QUESTIONS 1-9: 18

## 2021-06-18 ASSESSMENT — MIFFLIN-ST. JEOR: SCORE: 1199.04

## 2021-06-18 ASSESSMENT — PAIN SCALES - GENERAL: PAINLEVEL: NO PAIN (0)

## 2021-06-18 NOTE — LETTER
My Depression Action Plan  Name: Georgette Pereira   Date of Birth 1960  Date: 6/18/2021    My doctor: Yue Bermudez   My clinic: [facility]  [address]  [place] [address]  [phone]          GREEN    ZONE   Good Control    What it looks like:     Things are going generally well. You have normal ups and downs. You may even feel depressed from time to time, but bad moods usually last less than a day.   What you need to do:  1. Continue to care for yourself (see self care plan)  2. Check your depression survival kit and update it as needed  3. Follow your physician s recommendations including any medication.  4. Do not stop taking medication unless you consult with your physician first.           YELLOW         ZONE Getting Worse    What it looks like:     Depression is starting to interfere with your life.     It may be hard to get out of bed; you may be starting to isolate yourself from others.    Symptoms of depression are starting to last most all day and this has happened for several days.     You may have suicidal thoughts but they are not constant.   What you need to do:     1. Call your care team. Your response to treatment will improve if you keep your care team informed of your progress. Yellow periods are signs an adjustment may need to be made.     2. Continue your self-care.  Just get dressed and ready for the day.  Don't give yourself time to talk yourself out of it.    3. Talk to someone in your support network.    4. Open up your Depression Self-Care Plan/Wellness Kit.           RED    ZONE Medical Alert - Get Help    What it looks like:     Depression is seriously interfering with your life.     You may experience these or other symptoms: You can t get out of bed most days, can t work or engage in other necessary activities, you have trouble taking care of basic hygiene, or basic responsibilities, thoughts of suicide or death that will  not go away, self-injurious behavior.     What you need to do:  1. Call your care team and request a same-day appointment. If they are not available (weekends or after hours) call your local crisis line, emergency room or 911.          Depression Self-Care Plan / Wellness Kit    Many people find that medication and therapy are helpful treatments for managing depression. In addition, making small changes to your everyday life can help to boost your mood and improve your wellbeing. Below are some tips for you to consider. Be sure to talk with your medical provider and/or behavioral health consultant if your symptoms are worsening or not improving.     Sleep   Sleep hygiene  means all of the habits that support good, restful sleep. It includes maintaining a consistent bedtime and wake time, using your bedroom only for sleeping or sex, and keeping the bedroom dark and free of distractions like a computer, smartphone, or television.     Develop a Healthy Routine  Maintain good hygiene. Get out of bed in the morning, make your bed, brush your teeth, take a shower, and get dressed. Don t spend too much time viewing media that makes you feel stressed. Find time to relax each day.    Exercise  Get some form of exercise every day. This will help reduce pain and release endorphins, the  feel good  chemicals in your brain. It can be as simple as just going for a walk or doing some gardening, anything that will get you moving.      Diet  Strive to eat healthy foods, including fruits and vegetables. Drink plenty of water. Avoid excessive sugar, caffeine, alcohol, and other mood-altering substances.     Stay Connected with Others  Stay in touch with friends and family members.    Manage Your Mood  Try deep breathing, massage therapy, biofeedback, or meditation. Take part in fun activities when you can. Try to find something to smile about each day.     Psychotherapy  Be open to working with a therapist if your provider recommends  it.     Medication  Be sure to take your medication as prescribed. Most anti-depressants need to be taken every day. It usually takes several weeks for medications to work. Not all medicines work for all people. It is important to follow-up with your provider to make sure you have a treatment plan that is working for you. Do not stop your medication abruptly without first discussing it with your provider.    Crisis Resources   These hotlines are for both adults and children. They and are open 24 hours a day, 7 days a week unless noted otherwise.      National Suicide Prevention Lifeline   4-619-330-TALK (8082)      Crisis Text Line    www.crisistextline.org  Text HOME to 108353 from anywhere in the [place], anytime, about any type of crisis. A live, trained crisis counselor will receive the text and respond quickly.      [employer] Lifeline for LGBTQ Youth  A national crisis intervention and suicide lifeline for LGBTQ youth under 25. Provides a safe place to talk without judgement. Call 1-790.291.8862; text START to 631597 or visit www.thetrevorproject.org to talk to a trained counselor.      For [facility] crisis numbers, visit the [facility] website at:  https://[place].gov/dhs/people-we-serve/adults/health-care/mental-health/resources/crisis-contacts.jsp

## 2021-06-18 NOTE — RESULT ENCOUNTER NOTE
Georgette Pereira    Electrolytes and kidney function are stable.     Sincerely,       MENDEZ RUFFIN M.D.

## 2021-06-18 NOTE — PATIENT INSTRUCTIONS
Colonoscopy  will call you  - hold blood thinners.     Pulmonary Consult:  [facility]: [facility] [place] [phone]      Consult:  Will call you    Discuss cost of SHingles vaccine (SHingrix) with pharmacy.      Call to schedule imaging    -- MRI and Echocardiogram     Trial EstraTabs twice weekly    Return to clinic BP and med recheck in 2 months (late august)

## 2021-06-18 NOTE — PROGRESS NOTES
SUBJECTIVE:   CC: Georgette Pereira is an 60 year old woman who presents for preventive health visit.     59 y/o F here for AFE:  h/o COPD, HTN, DJD, pulmonary nodule (2mm solid LLL, 8/2019), CKD3a....     Recently resumed all meds to control HTN (had been underinsured prior...) ,     Has resumed inhalers for COPD, reports no problems.  She would like to resume care/follow up with Pulmonary    She is not smoking  Recent CT chest screening: showed calcified granuloma in RLL, no non calcified nodules.      Has MDD symptoms, is agreeable to restarting Antidepressants.  She would also like counseling.     Per recent   tel call, she described visual symptoms c/w L am. Fugax..  She started asa.  She started statin.   No symptoms for 2 weeks.  Her Car US = mild -mod Atherosclerosis build  Up.               Patient has been advised of split billing requirements and indicates understanding: Yes  Healthy Habits:     Getting at least 3 servings of Calcium per day:  NO    Bi-annual eye exam:  Yes    Dental care twice a year:  Yes    Sleep apnea or symptoms of sleep apnea:  None    Diet:  Low salt    Frequency of exercise:  1 day/week    Duration of exercise:  Less than 15 minutes    Taking medications regularly:  Yes    Barriers to taking medications:  None    Medication side effects:  None    PHQ-2 Total Score: 6    Additional concerns today:  No          Today's PHQ-2 Score:   PHQ-2 ( 1999 Pfizer) 6/18/2021   Q1: Little interest or pleasure in doing things 3   Q2: Feeling down, depressed or hopeless 3   PHQ-2 Score 6   Q1: Little interest or pleasure in doing things Nearly every day   Q2: Feeling down, depressed or hopeless Nearly every day   PHQ-2 Score 6       Abuse: Current or Past (Physical, Sexual or Emotional) - No  Do you feel safe in your environment? Yes        Social History     Tobacco Use     Smoking status: Former Smoker     Packs/day: 1.00     Years: 32.00     Pack years: 32.00     Types: Cigarettes, Other      Quit date: 2012     Years since quittin.4     Smokeless tobacco: Never Used     Tobacco comment: using nicotine replacement: e cigarattes   Substance Use Topics     Alcohol use: No     Alcohol/week: 0.0 standard drinks     If you drink alcohol do you typically have >3 drinks per day or >7 drinks per week? No    Alcohol Use 2021   Prescreen: >3 drinks/day or >7 drinks/week? No       Reviewed orders with patient.  Reviewed health maintenance and updated orders accordingly - Yes  Lab work is in process  Labs reviewed in EPIC  BP Readings from Last 3 Encounters:   21 (!) 136/94   19 (!) 153/101   19 (!) 161/101    Wt Readings from Last 3 Encounters:   21 63.5 kg (140 lb)   19 65.7 kg (144 lb 12.8 oz)   19 64.9 kg (143 lb)                  Patient Active Problem List   Diagnosis     Pulmonary nodules     Anxiety     Fibromyalgia     Knee pain     History of pneumonia, recurrent     Internal hemorrhoids with other complication     COPD, moderate (H)     Adult ADHD     Family history of early CAD     Hyperlipidemia LDL goal <130     Essential hypertension with goal blood pressure less than 140/90     Primary osteoarthritis involving multiple joints     Advanced directives, counseling/discussion     CKD (chronic kidney disease) stage 3, GFR 30-59 ml/min     Chronic kidney disease, stage 3a     Past Surgical History:   Procedure Laterality Date     C TMJ ARTHROSCOPY/SURGERY       HYSTERECTOMY VAGINAL         Social History     Tobacco Use     Smoking status: Former Smoker     Packs/day: 1.00     Years: 32.00     Pack years: 32.00     Types: Cigarettes, Other     Quit date: 2012     Years since quittin.4     Smokeless tobacco: Never Used     Tobacco comment: using nicotine replacement: e cigarattes   Substance Use Topics     Alcohol use: No     Alcohol/week: 0.0 standard drinks     Family History   Problem Relation Age of Onset     Breast Cancer Maternal  Grandmother      Coronary Artery Disease Maternal Grandmother         MI at 41     Cerebrovascular Disease Mother      Hypertension Mother      Heart Disease Mother      Cancer Mother         Lung     Substance Abuse Mother      Cerebrovascular Disease Father      Hypertension Father      Cancer Father         Metastatic, primary lung/Prostate     Substance Abuse Father      Cancer Other      Substance Abuse Brother      Depression Son      Schizophrenia Brother      Bipolar Disorder Brother      Substance Abuse Brother      Aneurysm Paternal Uncle         Brain Aneurysms         Current Outpatient Medications   Medication Sig Dispense Refill     albuterol (PROAIR HFA/PROVENTIL HFA/VENTOLIN HFA) 108 (90 Base) MCG/ACT inhaler Inhale 2 puffs into the lungs every 6 hours as needed for shortness of breath / dyspnea or wheezing 18 g 4     DULoxetine (CYMBALTA) 30 MG capsule Take 1 capsule (30 mg) by mouth 2 times daily 180 capsule 3     losartan-hydrochlorothiazide (HYZAAR) 100-25 MG tablet Take 1 tablet by mouth daily 90 tablet 1     metoprolol succinate ER (TOPROL-XL) 50 MG 24 hr tablet Take 1 tablet (50 mg) by mouth daily 90 tablet 3     mometasone-formoterol (DULERA) 200-5 MCG/ACT inhaler Inhale 2 puffs into the lungs 2 times daily 13 g 6     rosuvastatin (CRESTOR) 10 MG tablet Take 1 tablet (10 mg) by mouth daily 90 tablet 3     triamcinolone (KENALOG) 0.1 % external cream Apply topically 2 times daily 80 g 1     Allergies   Allergen Reactions     Buspirone Nausea     Lisinopril Cough     Vicodin [Hydrocodone-Acetaminophen] Nausea and Vomiting     Recent Labs   Lab Test 06/15/21  0824 03/28/19  1701 08/20/18  1432 08/12/16  0851 03/02/16  1217   LDL 88  --   --  55 51   HDL 55  --   --  50 56   TRIG 240*  --   --  202* 198*   ALT  --   --   --   --  19   CR 1.13* 1.06* 1.37* 0.89 0.91   GFRESTIMATED 53* 58* 40* 66 64   GFRESTBLACK 61 67 48* 80 77   POTASSIUM 3.7 3.9 4.2 3.7 4.6   TSH 1.73  --  1.12  --  0.58         Breast Cancer Screening:    FSH-7:   Breast CA Risk Assessment (FHS-7) 6/18/2021   Did any of your first-degree relatives have breast or ovarian cancer? No   Did any of your relatives have bilateral breast cancer? Unknown   Did any man in your family have breast cancer? No   Did any woman in your family have breast and ovarian cancer? Yes   Did any woman in your family have breast cancer before age 50 y? Yes   Do you have 2 or more relatives with breast and/or ovarian cancer? Yes   Do you have 2 or more relatives with breast and/or bowel cancer? Yes     She is interested in genetic counseling.      Pertinent mammograms are reviewed under the imaging tab.    History of abnormal Pap smear: Status post hysterectomy.      Reviewed and updated as needed this visit by clinical staff                 Reviewed and updated as needed this visit by Provider                Past Medical History:   Diagnosis Date     ACL (anterior cruciate ligament) tear 2/27/2012    Left      Chronic obstructive pulmonary disease with acute exacerbation (H) 11/23/2014    First hospitalization 11/23/14      CKD (chronic kidney disease) stage 3, GFR 30-59 ml/min 6/30/2020     CTS (carpal tunnel syndrome) 5/2010     Degenerative joint disease      Temporomandibular joint disorders, unspecified       Past Surgical History:   Procedure Laterality Date     C TMJ ARTHROSCOPY/SURGERY  2000     HYSTERECTOMY VAGINAL         Review of Systems   Constitutional: Negative for chills and fever.   HENT: Negative for congestion, ear pain, hearing loss and sore throat.    Eyes: Negative for pain and visual disturbance.   Respiratory: Positive for shortness of breath. Negative for cough.    Cardiovascular: Negative for chest pain, palpitations and peripheral edema.   Gastrointestinal: Negative for abdominal pain, constipation, diarrhea, heartburn, hematochezia and nausea.   Breasts:  Negative for tenderness, breast mass and discharge.   Genitourinary: Negative  "for dysuria, frequency, genital sores, hematuria, pelvic pain, urgency, vaginal bleeding and vaginal discharge.   Musculoskeletal: Positive for arthralgias and myalgias. Negative for joint swelling.   Skin: Negative for rash.   Neurological: Positive for weakness and headaches. Negative for dizziness and paresthesias.   Psychiatric/Behavioral: Negative for mood changes. The patient is nervous/anxious.            OBJECTIVE:   BP (!) 136/94 (BP Location: Right arm, Patient Position: Sitting, Cuff Size: Adult Regular)   Pulse 70   Temp 98  F (36.7  C) (Oral)   Ht 1.64 m (5' 4.57\")   Wt 63.5 kg (140 lb)   SpO2 98%   Breastfeeding No   BMI 23.61 kg/m       Physical Exam     GENERAL APPEARANCE: healthy, alert and no distress  EYES: Eyes grossly normal to inspection, PERRL and conjunctivae and sclerae normal  HENT: ear canals and TM's normal, nose and mouth without ulcers or lesions, oropharynx clear and oral mucous membranes moist  NECK: no adenopathy, no asymmetry, masses, or scars and thyroid normal to palpation  RESP: lungs clear to auscultation - no rales, rhonchi or wheezes  RESP: slightly diminished throughout  BREAST: normal without masses, tenderness or nipple discharge and no palpable axillary masses or adenopathy  BREAST: mild diffiuse discomfort during exam.   CV: regular rate and rhythm, normal S1 S2, no S3 or S4, no murmur, click or rub, no peripheral edema and peripheral pulses strong  ABDOMEN: soft, nontender, no hepatosplenomegaly, no masses and bowel sounds normal   (female): normal female external genitalia, normal urethral meatus, vaginal mucosal atrophy noted and normal post-hysterectomy exam without masses.   MS: no musculoskeletal defects are noted and gait is age appropriate without ataxia  SKIN: no suspicious lesions or rashes  NEURO: Normal strength and tone, sensory exam grossly normal, mentation intact and speech normal  PSYCH: mentation appears normal and affect " normal/bright    Diagnostic Test Results:  Labs reviewed in Epic   See orders.   No results found for this or any previous visit (from the past 24 hour(s)).    ASSESSMENT/PLAN:   1. Encounter for general adult medical examination with abnormal findings    - EYE ADULT REFERRAL; Future    2. Pulmonary nodules   reviewed recent Chest CT with her today  She is back on inhalers, not smoking  She is back on health insurance.   Would like to continue pulmonary visits/reassessments.     - PULMONARY MEDICINE REFERRAL    3. COPD, moderate (H)  Would like to continue pulmonary visits/reassessments.  She is back on inhalers, not smoking  - PULMONARY MEDICINE REFERRAL  - OFFICE/OUTPT VISIT,EST,LEVL IV    4. Essential hypertension with goal blood pressure less than 140/90  Control has improved a lot on medications.     - Basic metabolic panel  - OFFICE/OUTPT VISIT,EST,LEVL IV    5. Chronic kidney disease, stage 3a       6. Personal history of tobacco use, presenting hazards to health   quit a couple years ago.     7. PAD (peripheral artery disease) (H)       8. Low vitamin B12 level     - Vitamin B12  - OFFICE/OUTPT VISIT,EST,LEVL IV    9. Colon cancer screening  She is agreeable   - GASTROENTEROLOGY ADULT REF PROCEDURE ONLY; Future    10. Episodic tension-type headache, not intractable  New. Has awakened her at night  Will get Head MRI  - OFFICE/OUTPT VISIT,EST,LEVL IV    11. Amaurosis fugax, left eye   will check head imaging  Needs ophth exam   BP controlled  On asa    - Echocardiogram Complete; Future  - MRi/MRA Brain w/o & w Contrast; Future  - OFFICE/OUTPT VISIT,EST,LEVL IV    12. Adjustment disorder with mixed anxiety and depressed mood    She would like to resume cymbalta.  Interested in therapy.   - DULoxetine (CYMBALTA) 30 MG capsule; Take 1 capsule (30 mg) by mouth 2 times daily  Dispense: 180 capsule; Refill: 3  - MENTAL HEALTH REFERRAL  - Adult; Outpatient Treatment; Individual/Couples/Family/Group Therapy/Health  "Psychology; G: [facility] [phone]; We will contact you to schedule the appointment or please call with any questions    13. Fibromyalgia   resume:    - DULoxetine (CYMBALTA) 30 MG capsule; Take 1 capsule (30 mg) by mouth 2 times daily  Dispense: 180 capsule; Refill: 3    14. Family history of colon cancer   15. Family history of malignant neoplasm of breast     - CANCER RISK MGMT/CANCER GENETIC COUNSELING REFERRAL    16. Atrophic vaginitis  Using prn triamcinolone cream.  Interested in daily trial Vag Estrogen.   - estradiol (VAGIFEM) 10 MCG TABS vaginal tablet; Place 1 tablet (10 mcg) vaginally twice a week  Dispense: 24 tablet; Refill: 4    Patient has been advised of split billing requirements and indicates understanding: Yes  COUNSELING:  Reviewed preventive health counseling, as reflected in patient instructions       Vision screening    Estimated body mass index is 24.66 kg/m  as calculated from the following:    Height as of 6/18/19: 1.632 m (5' 4.25\").    Weight as of 6/18/19: 65.7 kg (144 lb 12.8 oz).        She reports that she quit smoking about 8 years ago. Her smoking use included cigarettes and other. She has a 32.00 pack-year smoking history. She has never used smokeless tobacco.      Counseling Resources:  ATP IV Guidelines  Pooled Cohorts Equation Calculator  Breast Cancer Risk Calculator  BRCA-Related Cancer Risk Assessment: FHS-7 Tool  FRAX Risk Assessment  ICSI Preventive Guidelines  Dietary Guidelines for Americans, 2010  USDA's MyPlate  ASA Prophylaxis  Lung CA Screening    Yue Bermudez MD  [facility] [place]  Answers for HPI/ROS submitted by the patient on 6/18/2021   Annual Exam:  If you checked off any problems, how difficult have these problems made it for you to do your work, take care of things at home, or get along with other people?: Extremely difficult  PHQ9 TOTAL SCORE: 18    "

## 2021-06-19 ASSESSMENT — PATIENT HEALTH QUESTIONNAIRE - PHQ9: SUM OF ALL RESPONSES TO PHQ QUESTIONS 1-9: 18

## 2021-06-21 ENCOUNTER — TELEPHONE (OUTPATIENT)
Dept: FAMILY MEDICINE | Facility: CLINIC | Age: 61
End: 2021-06-21

## 2021-06-21 NOTE — TELEPHONE ENCOUNTER
Prior Authorization Retail Medication Request    Medication/Dose:   ICD code (if different than what is on RX):  M79.7  Previously Tried and Failed:    Rationale:      Insurance Name:  ELIA JENKINS   Insurance ID: [id number]       Pharmacy Information (if different than what is on RX)  Name:  Ck  Phone: [phone]

## 2021-06-21 NOTE — TELEPHONE ENCOUNTER
PA Initiation    Medication: DULoxetine (CYMBALTA) 30 MG capsule  Insurance [facility] [facility] [employer] [facility] - Phone [phone] Fax [phone]  Pharmacy Filling the Rx: [id number] - [place] - [address]  Filling Pharmacy Phone: [phone]  Filling Pharmacy Fax: [phone]  Start Date: 6/21/2021

## 2021-06-24 ENCOUNTER — TELEPHONE (OUTPATIENT)
Dept: PULMONOLOGY | Facility: CLINIC | Age: 61
End: 2021-06-24

## 2021-06-24 NOTE — TELEPHONE ENCOUNTER
GUY with direct call back to arrange a follow up appt with Dr. Decker. Acknowledged she last saw her through  and it would be for [place] I could assist scheduling.

## 2021-06-25 ENCOUNTER — TELEPHONE (OUTPATIENT)
Dept: GASTROENTEROLOGY | Facility: CLINIC | Age: 61
End: 2021-06-25

## 2021-06-25 ENCOUNTER — TELEPHONE (OUTPATIENT)
Dept: PULMONOLOGY | Facility: CLINIC | Age: 61
End: 2021-06-25

## 2021-06-25 ENCOUNTER — HOSPITAL ENCOUNTER (OUTPATIENT)
Facility: AMBULATORY SURGERY CENTER | Age: 61
End: 2021-06-25
Attending: INTERNAL MEDICINE
Payer: COMMERCIAL

## 2021-06-25 DIAGNOSIS — Z11.59 ENCOUNTER FOR SCREENING FOR OTHER VIRAL DISEASES: ICD-10-CM

## 2021-06-25 NOTE — TELEPHONE ENCOUNTER
Screening Questions  1. What insurance is in the chart? [employer]    2.  Ordering/Referring Provider: Yue Bermudez MD    3. BMI 24.0    4. Are you on daily home oxygen? no    5. Do you have a history of difficult airway? no    6. Have you had a heart, lung, or liver transplant? no    7. Are you currently on dialysis? no    8. Have you had a stroke or Transient ischemic atttack (TIA) within 6 months? no    9. In the past 6 months, have you had any heart related issues including cardiomyopathy or heart attack?         If yes, did it require cardiac stenting or other implantable device?no    10. Do you have any implantable devices in your body (pacemaker, defib, LVAD)? no    11. Do you take nitroglycerin? If yes, how often? no    12. Are you currently taking any blood thinners?Baby Asprin    13. Are you a diabetic? no    14. (Females) Are you currently pregnant? no  If yes, how many weeks?    15. Have you had a procedure in the past that was difficult to tolerate with conscious sedation? Any allergies to Fentanyl or Versed no    16. Are you taking any scheduled prescription narcotics more than once daily? no    17. Do you have any chemical dependencies such as alcohol, street drugs, or methadone? no    18. Do you have any history of post-traumatic stress syndrome or mental health issues? no    19. Do you transfer independently? yes    20.  Do you have any issues with constipation? yes    21. Preferred Pharmacy for Pre Prescription     Scheduling Details    Procedure Scheduled: Colonoscopy  Provider/Surgeon: Madelin  Date of Procedure: 7/23  Location:   Caller (Please ask for phone number if not scheduled by patient): [facility]      Sedation Type: CS  Conscious Sedation- Needs  for 6 hours after the procedure  MAC/General-Needs  for 24 hours after procedure    Pre-op Required at [facility] and OR for  MAC sedation:   (if yes advise patient they will need a pre-op prior to procedure)      Is patient on blood  thinners? -ASPRIN (If yes- inform patient to follow up with PCP or provider for follow up instructions)     Informed patient they will need an adult  YES  Cannot take any type of public or medical transportation alone    Informed Patient of COVID Test Requirement YES    Confirmed Nurse will call to complete assessment YES    Additional comments:

## 2021-06-25 NOTE — TELEPHONE ENCOUNTER
Pt called back and able to arrange a follow up appt with Dr. Decker in person with request for a PFT. This was arranged and details confirmed with pt who requested hard copy be mailed to her home. Mailing address verified.

## 2021-06-28 ENCOUNTER — OFFICE VISIT (OUTPATIENT)
Dept: OPTOMETRY | Facility: CLINIC | Age: 61
End: 2021-06-28
Payer: COMMERCIAL

## 2021-06-28 DIAGNOSIS — H53.122 EPISODE OF VISUAL LOSS OF LEFT EYE: Primary | ICD-10-CM

## 2021-06-28 LAB
CRP SERPL-MCNC: <2.9 MG/L (ref 0–8)
ERYTHROCYTE [SEDIMENTATION RATE] IN BLOOD BY WESTERGREN METHOD: 9 MM/H (ref 0–30)

## 2021-06-28 PROCEDURE — 99203 OFFICE O/P NEW LOW 30 MIN: CPT | Performed by: OPTOMETRIST

## 2021-06-28 PROCEDURE — 86140 C-REACTIVE PROTEIN: CPT

## 2021-06-28 PROCEDURE — 36415 COLL VENOUS BLD VENIPUNCTURE: CPT

## 2021-06-28 PROCEDURE — 85652 RBC SED RATE AUTOMATED: CPT

## 2021-06-28 ASSESSMENT — EXTERNAL EXAM - LEFT EYE: OS_EXAM: NORMAL

## 2021-06-28 ASSESSMENT — CONF VISUAL FIELD
OD_NORMAL: 1
OS_NORMAL: 1

## 2021-06-28 ASSESSMENT — SLIT LAMP EXAM - LIDS
COMMENTS: NORMAL
COMMENTS: NORMAL

## 2021-06-28 ASSESSMENT — TONOMETRY
IOP_METHOD: APPLANATION
OD_IOP_MMHG: 15
OS_IOP_MMHG: 17

## 2021-06-28 ASSESSMENT — CUP TO DISC RATIO
OD_RATIO: 0.2
OS_RATIO: 0.3

## 2021-06-28 ASSESSMENT — EXTERNAL EXAM - RIGHT EYE: OD_EXAM: NORMAL

## 2021-06-28 ASSESSMENT — VISUAL ACUITY
OD_CC: 20/20
METHOD: SNELLEN - LINEAR
OS_CC: 20/20
OS_CC+: -1
CORRECTION_TYPE: GLASSES

## 2021-06-28 NOTE — PATIENT INSTRUCTIONS
Patient reports multiple episodes of complete vision loss in her left eye over the past 3 months.  Ocular health within normal limits today.     Order CRP and ESR lab work to rule out giant cell arteritis.   Patient already scheduled for MRI/MRA of brain on 7/2/21.   Carotid ultrasound unremarkable 6/10/21.     I will be in touch with you regarding your lab work results.   Notify our clinic with any concerns.     Faustino Sharma OD  [address] [place]  [place]  [id number] [phone]

## 2021-06-28 NOTE — LETTER
6/28/2021         RE: Georgette Pereira  [address]  [place] [address]        Dear Colleague,    Thank you for referring your patient, Georgette Pereira, to the [facility]. Please see a copy of my visit note below.    Chief Complaint   Patient presents with     Temporary Loss Of Vision     Loss Of Vision   HPI    Temporary Loss Of Vision      Laterality:  left eye     Onset:  sudden     Onset:  3 months ago     Quality:  missing vision     Severity:  complete loss of vision     Frequency:  intermittently     Associated symptoms:  headache     Treatments tried:  no treatments   Comments      Sees a white spot in vision then vision goes completely white and loses vision up to 1.5 hours. This has happened to patient 5 times in the last 3 months. Patient has had uncontrolled blood pressure and headaches for 9 months and the last 1 month has been on medication for high blood pressure and blood pressure is regulated now and it still happened 5 nights ago.     Eyes: painless vision loss left eye, multiple episodes  Constitutional: No fevers, chills, or weight changes.      Medical, surgical and family histories reviewed and updated 6/28/2021.       OBJECTIVE: See Ophthalmology exam    ASSESSMENT:    ICD-10-CM    1. Episode of visual loss of left eye  H53.122 CRP inflammation     Erythrocyte sedimentation rate auto      PLAN:     Patient Instructions   Patient reports multiple episodes of complete vision loss in her left eye over the past 3 months.  Ocular health within normal limits today.     Order CRP and ESR lab work to rule out giant cell arteritis.   Patient already scheduled for MRI/MRA of brain on 7/2/21.   Carotid ultrasound unremarkable 6/10/21.     I will be in touch with you regarding your lab work results.   Notify our clinic with any concerns.     Faustino Sharma, OD  [facility]  [address]  [place]  [address]    [phone]            Again, thank you for allowing me to participate in the care of your patient.        Sincerely,        Faustino Sharma OD

## 2021-06-29 NOTE — TELEPHONE ENCOUNTER
Prior Authorization Approval    Medication: DULoxetine (CYMBALTA) 30 MG capsule was approved on 6/24/2021  Effective: 6/20/2021 to 6/21/2022  Reference #:    Approved Dose/Quantity:   Insurance Company: [employer] - Phone [phone] Fax [phone]  Expected CoPay:    Pharmacy Filling the Rx: [id number] - [place], [place] - [address]  Pharmacy Notified: Yes  Patient Notified: Comment:  **Instructed pharmacy to notify patient when script is ready to /ship.**

## 2021-07-02 ENCOUNTER — TELEPHONE (OUTPATIENT)
Dept: INTERNAL MEDICINE | Facility: CLINIC | Age: 61
End: 2021-07-02

## 2021-07-02 ENCOUNTER — TELEPHONE (OUTPATIENT)
Dept: NEUROLOGY | Facility: CLINIC | Age: 61
End: 2021-07-02

## 2021-07-02 ENCOUNTER — ANCILLARY PROCEDURE (OUTPATIENT)
Dept: MRI IMAGING | Facility: CLINIC | Age: 61
End: 2021-07-02
Attending: INTERNAL MEDICINE
Payer: COMMERCIAL

## 2021-07-02 ENCOUNTER — MYC MEDICAL ADVICE (OUTPATIENT)
Dept: INTERNAL MEDICINE | Facility: CLINIC | Age: 61
End: 2021-07-02

## 2021-07-02 DIAGNOSIS — I66.01 MIDDLE CEREBRAL ARTERY STENOSIS, RIGHT: ICD-10-CM

## 2021-07-02 DIAGNOSIS — G45.3 AMAUROSIS FUGAX: ICD-10-CM

## 2021-07-02 DIAGNOSIS — G45.3 AMAUROSIS FUGAX, LEFT EYE: ICD-10-CM

## 2021-07-02 DIAGNOSIS — G44.219 EPISODIC TENSION-TYPE HEADACHE, NOT INTRACTABLE: ICD-10-CM

## 2021-07-02 DIAGNOSIS — I63.512 ACUTE ISCHEMIC CEREBROVASCULAR ACCIDENT (CVA) INVOLVING LEFT MIDDLE CEREBRAL ARTERY TERRITORY (H): Primary | ICD-10-CM

## 2021-07-02 LAB — RADIOLOGIST FLAGS: ABNORMAL

## 2021-07-02 PROCEDURE — A9585 GADOBUTROL INJECTION: HCPCS | Performed by: RADIOLOGY

## 2021-07-02 PROCEDURE — 70553 MRI BRAIN STEM W/O & W/DYE: CPT | Mod: TC | Performed by: RADIOLOGY

## 2021-07-02 PROCEDURE — 70544 MR ANGIOGRAPHY HEAD W/O DYE: CPT | Mod: TC | Performed by: RADIOLOGY

## 2021-07-02 RX ORDER — ASPIRIN 325 MG
325 TABLET, DELAYED RELEASE (ENTERIC COATED) ORAL DAILY
Start: 2021-07-02 | End: 2021-07-22

## 2021-07-02 RX ORDER — GADOBUTROL 604.72 MG/ML
6.5 INJECTION INTRAVENOUS ONCE
Status: COMPLETED | OUTPATIENT
Start: 2021-07-02 | End: 2021-07-02

## 2021-07-02 RX ADMIN — GADOBUTROL 6.5 ML: 604.72 INJECTION INTRAVENOUS at 10:54

## 2021-07-02 NOTE — TELEPHONE ENCOUNTER
MD called patient with today's MRI/MRA results    RECENT STROKE (There is a 9 mm ovoid recent subacute infarct within the mid right centrum semiovale.) .       Abnormal MRA with flow limiting lesions at R mid cerebral and posteriror arteries.        -- she had started asa, crestor, BP meds past month  -- she has not had any neurological symptoms nor has she had AM Fugax since I saw her last month   -- BP controlled    If symptoms recur, she will go to ER    She will schedule Neurology visit and let me know who/when.      .

## 2021-07-02 NOTE — RESULT ENCOUNTER NOTE
Amie    Imaging as discussed.    Continue current management with blood pressure medications, cholesterol medication and aspirin.      As you mentioned, you will see neurologist 7/22/21, I have been in touch with him    If any new symptoms develop, or the eye symptoms return, go to ER for immediate evaluation.     MENDEZ Collado M.D.

## 2021-07-02 NOTE — TELEPHONE ENCOUNTER
[facility] Call Center    Phone Message    May a detailed message be left on voicemail: yes     Reason for Call: Other: Pt is requesting to be sent a map of how to get to the location. Please sent it to the pt via [employer]. Thanks!     Action Taken: Message routed to:  [facility] neuro    Travel Screening: Not Applicable

## 2021-07-02 NOTE — TELEPHONE ENCOUNTER
Received call from Kurtis with [facility] to report the following:  MRI Brain completed 07/02/2021  Radiologist noted recent subacute R centrum semiovale stroke. Report is uploaded in Epic to review.    JASBIR Wright RN  [facility]

## 2021-07-02 NOTE — TELEPHONE ENCOUNTER
Called patient and information was given to patient as how to get to clinic for her up coming appointment

## 2021-07-07 NOTE — TELEPHONE ENCOUNTER
RECORDS RECEIVED FROM: Internal   Date of Appt: 7/22/21   NOTES (FOR ALL VISITS) STATUS DETAILS   OFFICE NOTE from referring provider Internal Dr Yue Bermudez @ [facility] (PCP):  7/2/21 mychart encounter  6/18/21   OFFICE NOTE from other specialist Internal Dr Faustino Sharma @ [facility]:  6/28/21   DISCHARGE SUMMARY from hospital N/A    DISCHARGE REPORT from the ER N/A    OPERATIVE REPORT N/A    MEDICATION LIST Internal    IMAGING  (FOR ALL VISITS)     EMG N/A    EEG N/A    LUMBAR PUNCTURE N/A    REI SCAN N/A    ULTRASOUND (CAROTID BILAT) *VASCULAR* Internal [facility]:  US Carotid Bilat 6/10/21   MRI (HEAD, NECK, SPINE) Internal [facility]:  MRI Brain 7/2/21  MRA Brain COW 7/2/21   CT (HEAD, NECK, SPINE) N/A

## 2021-07-19 ENCOUNTER — OFFICE VISIT (OUTPATIENT)
Dept: PULMONOLOGY | Facility: CLINIC | Age: 61
End: 2021-07-19
Attending: INTERNAL MEDICINE
Payer: COMMERCIAL

## 2021-07-19 ENCOUNTER — MYC MEDICAL ADVICE (OUTPATIENT)
Dept: INTERNAL MEDICINE | Facility: CLINIC | Age: 61
End: 2021-07-19

## 2021-07-19 VITALS
HEART RATE: 66 BPM | SYSTOLIC BLOOD PRESSURE: 131 MMHG | OXYGEN SATURATION: 97 % | WEIGHT: 134 LBS | DIASTOLIC BLOOD PRESSURE: 87 MMHG | BODY MASS INDEX: 22.6 KG/M2

## 2021-07-19 DIAGNOSIS — Z87.891 PERSONAL HISTORY OF TOBACCO USE: ICD-10-CM

## 2021-07-19 DIAGNOSIS — R91.8 PULMONARY NODULES: ICD-10-CM

## 2021-07-19 DIAGNOSIS — J44.9 COPD, MODERATE (H): Chronic | ICD-10-CM

## 2021-07-19 DIAGNOSIS — J44.9 COPD, MODERATE (H): Primary | ICD-10-CM

## 2021-07-19 DIAGNOSIS — J41.0 SIMPLE CHRONIC BRONCHITIS (H): Primary | ICD-10-CM

## 2021-07-19 PROCEDURE — 94729 DIFFUSING CAPACITY: CPT | Performed by: INTERNAL MEDICINE

## 2021-07-19 PROCEDURE — G0463 HOSPITAL OUTPT CLINIC VISIT: HCPCS | Mod: 25

## 2021-07-19 PROCEDURE — 99214 OFFICE O/P EST MOD 30 MIN: CPT | Mod: 25 | Performed by: INTERNAL MEDICINE

## 2021-07-19 PROCEDURE — 94375 RESPIRATORY FLOW VOLUME LOOP: CPT | Performed by: INTERNAL MEDICINE

## 2021-07-19 RX ORDER — TIOTROPIUM BROMIDE 18 UG/1
18 CAPSULE ORAL; RESPIRATORY (INHALATION) DAILY
Qty: 90 CAPSULE | Refills: 3 | Status: SHIPPED | OUTPATIENT
Start: 2021-07-19 | End: 2022-05-05

## 2021-07-19 RX ORDER — ALBUTEROL SULFATE 0.83 MG/ML
2.5 SOLUTION RESPIRATORY (INHALATION) 4 TIMES DAILY
Qty: 100 ML | Refills: 4 | Status: SHIPPED | OUTPATIENT
Start: 2021-07-19 | End: 2023-01-26

## 2021-07-19 RX ORDER — NICOTINE 21 MG/24HR
1 PATCH, TRANSDERMAL 24 HOURS TRANSDERMAL EVERY 24 HOURS
Qty: 28 PATCH | Refills: 0 | Status: SHIPPED | OUTPATIENT
Start: 2021-07-19 | End: 2022-02-24

## 2021-07-19 NOTE — LETTER
7/19/2021         RE: Georgette Pereira  [address]  [place] [address]        Dear Colleague,    Thank you for referring your patient, Georgette Pereira, to the [facility] LUNG [facility]ENCE AND [facility] [place]. Please see a copy of my visit note below.    Pulmonary Clinic Return Visit  History of Present Illness    Amie Pereira is a 60 yof with history of COPD with moderately severe obstruction on PFTs and ongoing tobacco abuse.  We last visited in April 2020 at which point she was experiencing increased shortness of breath.  CT chest and TTE had previously been obtained in August 2019 for similar complaints and returned generally unremarkable.  We discussed the importance of complete smoking cessation and also reviewed best practices for inhaler use - she is recommended to be on triple therapy with Spiriva and Dulera.    The returns to clinic today with ongoing functional limitation due to shortness of breath.  She notes it is more difficult to breathe in the high heat and humidity as we have recently been experiencing.  At present she would be able to walk about 1.5 blocks before she would need to stop and rest.  Going back 1 year, this walk distance is unchanged.  She denies any episodes of pneumonia or AECOPD since our last visit.  She continues to vape - a few puffs per day.  She previously smoked up to 1/2 pack per day cigarettes for 30 years.  In the early 2000s she gave up cigarettes and switched to vaping.  With respect to inhalers it sounds like there is still some confusion as to what her regimen should be.  She has albuterol and Dulera in her purse today - she tells me she is using dulera as needed up to 3x daily and albuterol the same amount.  She does not have spiriva with her but states she was using that once daily up until she ran out about 3 months ago.  She used to have a nebulizer machine but it's broken.        Review of Systems:  10 of 14  systems reviewed and are negative unless otherwise stated in HPI.    Past Medical History:   Diagnosis Date     ACL (anterior cruciate ligament) tear 2012    Left      Chronic obstructive pulmonary disease with acute exacerbation (H) 2014    First hospitalization 14      CKD (chronic kidney disease) stage 3, GFR 30-59 ml/min 2020     CTS (carpal tunnel syndrome) 2010     Degenerative joint disease      Temporomandibular joint disorders, unspecified        Past Surgical History:   Procedure Laterality Date     C TMJ ARTHROSCOPY/SURGERY       HYSTERECTOMY VAGINAL         Family History   Problem Relation Age of Onset     Breast Cancer Maternal Grandmother      Coronary Artery Disease Maternal Grandmother         MI at 41     Cerebrovascular Disease Mother      Hypertension Mother      Heart Disease Mother      Cancer Mother         Lung     Substance Abuse Mother      Cerebrovascular Disease Father      Hypertension Father      Cancer Father         Metastatic, primary lung/Prostate     Substance Abuse Father      Cancer Other      Substance Abuse Brother      Depression Son      Schizophrenia Brother      Bipolar Disorder Brother      Substance Abuse Brother      Aneurysm Paternal Uncle         Brain Aneurysms       Social History     Socioeconomic History     Marital status:      Spouse name: None     Number of children: None     Years of education: None     Highest education level: None   Occupational History     None   Tobacco Use     Smoking status: Former Smoker     Packs/day: 1.00     Years: 32.00     Pack years: 32.00     Types: Cigarettes, Other     Quit date: 2012     Years since quittin.5     Smokeless tobacco: Never Used     Tobacco comment: using nicotine replacement: e cigarattes   Substance and Sexual Activity     Alcohol use: No     Alcohol/week: 0.0 standard drinks     Drug use: No     Sexual activity: Never     Partners: Male     Birth control/protection:  Surgical   Other Topics Concern     Parent/sibling w/ CABG, MI or angioplasty before 65F 55M? Yes   Social History Narrative    Lives in apartment, single ().  2 kids.  Drives [employer].          The patient has a 30 pk yr tobacco hx.  She has no active use but using e -cig. Quit actual cig Oct 2012.  Alcohol use is 0 alcoholic drinks per week.  She denies use of recreational drugs.          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          The patient is single.  Has 2 children.        Hot Tube Exposure: NO    Recent Travel: NO     Hx of incarceration:  NO    Bird Exposure:   NO    Animal Exposure:  NO    Inhalation Exposure:  NO             Social Determinants of Health     Financial Resource Strain:      Difficulty of Paying Living Expenses:    Food Insecurity:      Worried About Running Out of Food in the Last Year:      Ran Out of Food in the Last Year:    Transportation Needs:      Lack of Transportation (Medical):      Lack of Transportation (Non-Medical):    Physical Activity:      Days of Exercise per Week:      Minutes of Exercise per Session:    Stress:      Feeling of Stress :    Social Connections:      Frequency of Communication with Friends and Family:      Frequency of Social Gatherings with Friends and Family:      Attends Hindu Services:      Active Member of Clubs or Organizations:      Attends Club or Organization Meetings:      Marital Status:    Intimate Partner Violence:      Fear of Current or Ex-Partner:      Emotionally Abused:      Physically Abused:      Sexually Abused:          Allergies   Allergen Reactions     Buspirone Nausea     Lisinopril Cough     Vicodin [Hydrocodone-Acetaminophen] Nausea and Vomiting         Current Outpatient Medications:      albuterol (PROAIR HFA/PROVENTIL HFA/VENTOLIN HFA) 108 (90 Base) MCG/ACT inhaler, Inhale 2 puffs into the lungs every 6 hours as needed for shortness of breath / dyspnea or wheezing, Disp: 18 g,  Rfl: 4     albuterol (PROVENTIL) (2.5 MG/3ML) 0.083% neb solution, Take 1 vial (2.5 mg) by nebulization 4 times daily, Disp: 100 mL, Rfl: 4     aspirin (ASA) 325 MG EC tablet, Take 1 tablet (325 mg) by mouth daily, Disp:  , Rfl:      DULoxetine (CYMBALTA) 30 MG capsule, Take 1 capsule (30 mg) by mouth 2 times daily, Disp: 180 capsule, Rfl: 3     estradiol (VAGIFEM) 10 MCG TABS vaginal tablet, Place 1 tablet (10 mcg) vaginally twice a week, Disp: 24 tablet, Rfl: 4     losartan-hydrochlorothiazide (HYZAAR) 100-25 MG tablet, Take 1 tablet by mouth daily, Disp: 90 tablet, Rfl: 1     metoprolol succinate ER (TOPROL-XL) 50 MG 24 hr tablet, Take 1 tablet (50 mg) by mouth daily, Disp: 90 tablet, Rfl: 3     mometasone-formoterol (DULERA) 200-5 MCG/ACT inhaler, Inhale 2 puffs into the lungs 2 times daily, Disp: 13 g, Rfl: 6     nicotine (NICODERM CQ) 21 MG/24HR 24 hr patch, Place 1 patch onto the skin every 24 hours, Disp: 28 patch, Rfl: 0     rosuvastatin (CRESTOR) 10 MG tablet, Take 1 tablet (10 mg) by mouth daily, Disp: 90 tablet, Rfl: 3     tiotropium (SPIRIVA) 18 MCG inhaled capsule, Inhale 1 capsule (18 mcg) into the lungs daily, Disp: 90 capsule, Rfl: 3     triamcinolone (KENALOG) 0.1 % external cream, Apply topically 2 times daily, Disp: 80 g, Rfl: 1      Physical Exam:  /87   Pulse 66   Wt 60.8 kg (134 lb)   SpO2 97%   BMI 22.60 kg/m    GENERAL: Well developed, well nourished, alert, and in no apparent distress.  HEENT: Normocephalic, atraumatic. PERRL, EOMI. .  NECK: supple, no masses, no thyromegaly.  RESP:  Normal respiratory effort.  CTAB.  No rales, wheezes, rhonchi.  No cyanosis or clubbing.  CV: Normal S1, S2, regular rhythm, normal rate. No murmur.  No LE edema.   ABDOMEN:  non-distended.   SKIN: warm and dry. No rash.  NEURO: AAOx3.  Normal gait.  Fluent speech.  PSYCH: mentation appears normal.     Results:  PFTs: R 46%, FVC 89%, FEV1 52%, DLCO 78%.  Study demonstrates a moderately severe  obstructive ventilatory defect with normal gas exchange.  Imaging (personally reviewed in clinic today):  CT Chest 6/2021:   1.  Old granulomatous disease.  2.  No noncalcified pulmonary nodules on today's examination.  3.  Emphysema.      Assessment and Plan:   Georgette Pereira is a 60 year old female with a history of COPD and tobacco abuse who presents to pulmonary clinic today for follow up.  1) Smoking Cessation. We discussed that the only way to prevent further decline in lung function would be to quit smoking all together.  Complete cessation from all tobacco products was strongly encouraged again today.  A prescription for nicotine patches was sent to her pharmacy at her request.      2) COPD.  PFTs show moderately severe obstruction which is stable compared with previous studies.  Ms. Pereira continues to experience functionally limiting dyspnea but has been without AECOPD since our last visit.  She again reports somewhat confusing information regarding inhaler regimen and usage.  Ideally, she would be on triple therapy with LABA/LAMA/ICS consistently.  We reviewed today the differences between maintenance and rescue inhalers and that she should use Spiriva and Dulera every day as prescribed, regardless of symptoms.  She should continue to use albuterol as needed for rescue.  New prescriptions were provided as requested today as was a DME order for a new nebulizer machine.    DME (Durable Medical Equipment) Orders and Documentation  Orders Placed This Encounter   Procedures     Nebulizer and Supplies Order for DME - ONLY FOR DME      The patient was assessed and it was determined the patient is in need of the following listed DME Supplies/Equipment. Please complete supporting documentation below to demonstrate medical necessity.      Nebulizer Documentation  The patient was seen 07/20/2021. After assessment, the patient will need to be treated with ongoing nebulizer for treatment/management of  COPD.    3) Dyspnea.  Work up including CT Chest and TTE were obtained in August 2019 for progressive dyspnea.  Both returned essentially unremarkable and did not elucidate any alternative etiologies of dyspnea outside of known COPD.  I continue to think that COPD is the most likely etiology of her dyspnea.  I do think she would be an excellent candidate for pulmonary rehab and have placed an order for this today.  I also encouraged her to inquire into any smoking cessation resources that may be available through pulmonary rehab as well.    4) Pulmonary nodules/ Lung cancer screening.  Based on age and smoking history she probably does qualify for lung ca screening.  Last CT from June did not show any non-calcified pulmonary nodules. Repeat CT for surveillance/annual screening would be due June 2022.    Return to clinic in 6 months, sooner if worsening symptoms.     Up to date on Pneumovax (2012).    Ewa Decker MD  Pulmonary and Critical Care Medicine    The above note was dictated using voice recognition software and may include typographical errors. Please contact the author for any clarifications.          Again, thank you for allowing me to participate in the care of your patient.        Sincerely,        Lizeth Decker MD

## 2021-07-19 NOTE — NURSING NOTE
Chief Complaint   Patient presents with     General Visit     follow up     Vitals were taken and medications were reconciled.     TAMIKA Allen

## 2021-07-20 LAB
DLCOUNC-%PRED-PRE: 78 %
DLCOUNC-PRE: 16.45 ML/MIN/MMHG
DLCOUNC-PRED: 20.92 ML/MIN/MMHG
ERV-%PRED-PRE: 105 %
ERV-PRE: 1.02 L
ERV-PRED: 0.96 L
EXPTIME-PRE: 10.21 SEC
FEF2575-%PRED-PRE: 23 %
FEF2575-PRE: 0.54 L/SEC
FEF2575-PRED: 2.32 L/SEC
FEFMAX-%PRED-PRE: 45 %
FEFMAX-PRE: 2.94 L/SEC
FEFMAX-PRED: 6.45 L/SEC
FEV1-%PRED-PRE: 52 %
FEV1-PRE: 1.35 L
FEV1FEV6-PRE: 51 %
FEV1FEV6-PRED: 81 %
FEV1FVC-PRE: 46 %
FEV1FVC-PRED: 79 %
FEV1SVC-PRE: 50 %
FEV1SVC-PRED: 76 %
FIFMAX-PRE: 3.28 L/SEC
FVC-%PRED-PRE: 89 %
FVC-PRE: 2.93 L
FVC-PRED: 3.28 L
IC-%PRED-PRE: 68 %
IC-PRE: 1.68 L
IC-PRED: 2.44 L
VA-%PRED-PRE: 87 %
VA-PRE: 4.35 L
VC-%PRED-PRE: 79 %
VC-PRE: 2.7 L
VC-PRED: 3.4 L

## 2021-07-20 NOTE — PROGRESS NOTES
Pulmonary Clinic Return Visit  History of Present Illness    Amie Pereira is a 60 yof with history of COPD with moderately severe obstruction on PFTs and ongoing tobacco abuse.  We last visited in April 2020 at which point she was experiencing increased shortness of breath.  CT chest and TTE had previously been obtained in August 2019 for similar complaints and returned generally unremarkable.  We discussed the importance of complete smoking cessation and also reviewed best practices for inhaler use - she is recommended to be on triple therapy with Spiriva and Dulera.    The returns to clinic today with ongoing functional limitation due to shortness of breath.  She notes it is more difficult to breathe in the high heat and humidity as we have recently been experiencing.  At present she would be able to walk about 1.5 blocks before she would need to stop and rest.  Going back 1 year, this walk distance is unchanged.  She denies any episodes of pneumonia or AECOPD since our last visit.  She continues to vape - a few puffs per day.  She previously smoked up to 1/2 pack per day cigarettes for 30 years.  In the early 2000s she gave up cigarettes and switched to vaping.  With respect to inhalers it sounds like there is still some confusion as to what her regimen should be.  She has albuterol and Dulera in her purse today - she tells me she is using dulera as needed up to 3x daily and albuterol the same amount.  She does not have spiriva with her but states she was using that once daily up until she ran out about 3 months ago.  She used to have a nebulizer machine but it's broken.        Review of Systems:  10 of 14 systems reviewed and are negative unless otherwise stated in HPI.    Past Medical History:   Diagnosis Date     ACL (anterior cruciate ligament) tear 2/27/2012    Left      Chronic obstructive pulmonary disease with acute exacerbation (H) 11/23/2014    First hospitalization 11/23/14      CKD (chronic kidney  disease) stage 3, GFR 30-59 ml/min 2020     CTS (carpal tunnel syndrome) 2010     Degenerative joint disease      Temporomandibular joint disorders, unspecified        Past Surgical History:   Procedure Laterality Date     C TMJ ARTHROSCOPY/SURGERY       HYSTERECTOMY VAGINAL         Family History   Problem Relation Age of Onset     Breast Cancer Maternal Grandmother      Coronary Artery Disease Maternal Grandmother         MI at 41     Cerebrovascular Disease Mother      Hypertension Mother      Heart Disease Mother      Cancer Mother         Lung     Substance Abuse Mother      Cerebrovascular Disease Father      Hypertension Father      Cancer Father         Metastatic, primary lung/Prostate     Substance Abuse Father      Cancer Other      Substance Abuse Brother      Depression Son      Schizophrenia Brother      Bipolar Disorder Brother      Substance Abuse Brother      Aneurysm Paternal Uncle         Brain Aneurysms       Social History     Socioeconomic History     Marital status:      Spouse name: None     Number of children: None     Years of education: None     Highest education level: None   Occupational History     None   Tobacco Use     Smoking status: Former Smoker     Packs/day: 1.00     Years: 32.00     Pack years: 32.00     Types: Cigarettes, Other     Quit date: 2012     Years since quittin.5     Smokeless tobacco: Never Used     Tobacco comment: using nicotine replacement: e cigarattes   Substance and Sexual Activity     Alcohol use: No     Alcohol/week: 0.0 standard drinks     Drug use: No     Sexual activity: Never     Partners: Male     Birth control/protection: Surgical   Other Topics Concern     Parent/sibling w/ CABG, MI or angioplasty before 65F 55M? Yes   Social History Narrative    Lives in apartment, single ().  2 kids.  Drives [employer].          The patient has a 30 pk yr tobacco hx.  She has no active use but using e -cig. Quit actual cig  Oct 2012.  Alcohol use is 0 alcoholic drinks per week.  She denies use of recreational drugs.          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          The patient is single.  Has 2 children.        Hot Tube Exposure: NO    Recent Travel: NO     Hx of incarceration:  NO    Bird Exposure:   NO    Animal Exposure:  NO    Inhalation Exposure:  NO             Social Determinants of Health     Financial Resource Strain:      Difficulty of Paying Living Expenses:    Food Insecurity:      Worried About Running Out of Food in the Last Year:      Ran Out of Food in the Last Year:    Transportation Needs:      Lack of Transportation (Medical):      Lack of Transportation (Non-Medical):    Physical Activity:      Days of Exercise per Week:      Minutes of Exercise per Session:    Stress:      Feeling of Stress :    Social Connections:      Frequency of Communication with Friends and Family:      Frequency of Social Gatherings with Friends and Family:      Attends Sikhism Services:      Active Member of Clubs or Organizations:      Attends Club or Organization Meetings:      Marital Status:    Intimate Partner Violence:      Fear of Current or Ex-Partner:      Emotionally Abused:      Physically Abused:      Sexually Abused:          Allergies   Allergen Reactions     Buspirone Nausea     Lisinopril Cough     Vicodin [Hydrocodone-Acetaminophen] Nausea and Vomiting         Current Outpatient Medications:      albuterol (PROAIR HFA/PROVENTIL HFA/VENTOLIN HFA) 108 (90 Base) MCG/ACT inhaler, Inhale 2 puffs into the lungs every 6 hours as needed for shortness of breath / dyspnea or wheezing, Disp: 18 g, Rfl: 4     albuterol (PROVENTIL) (2.5 MG/3ML) 0.083% neb solution, Take 1 vial (2.5 mg) by nebulization 4 times daily, Disp: 100 mL, Rfl: 4     aspirin (ASA) 325 MG EC tablet, Take 1 tablet (325 mg) by mouth daily, Disp:  , Rfl:      DULoxetine (CYMBALTA) 30 MG capsule, Take 1 capsule (30 mg) by mouth 2  times daily, Disp: 180 capsule, Rfl: 3     estradiol (VAGIFEM) 10 MCG TABS vaginal tablet, Place 1 tablet (10 mcg) vaginally twice a week, Disp: 24 tablet, Rfl: 4     losartan-hydrochlorothiazide (HYZAAR) 100-25 MG tablet, Take 1 tablet by mouth daily, Disp: 90 tablet, Rfl: 1     metoprolol succinate ER (TOPROL-XL) 50 MG 24 hr tablet, Take 1 tablet (50 mg) by mouth daily, Disp: 90 tablet, Rfl: 3     mometasone-formoterol (DULERA) 200-5 MCG/ACT inhaler, Inhale 2 puffs into the lungs 2 times daily, Disp: 13 g, Rfl: 6     nicotine (NICODERM CQ) 21 MG/24HR 24 hr patch, Place 1 patch onto the skin every 24 hours, Disp: 28 patch, Rfl: 0     rosuvastatin (CRESTOR) 10 MG tablet, Take 1 tablet (10 mg) by mouth daily, Disp: 90 tablet, Rfl: 3     tiotropium (SPIRIVA) 18 MCG inhaled capsule, Inhale 1 capsule (18 mcg) into the lungs daily, Disp: 90 capsule, Rfl: 3     triamcinolone (KENALOG) 0.1 % external cream, Apply topically 2 times daily, Disp: 80 g, Rfl: 1      Physical Exam:  /87   Pulse 66   Wt 60.8 kg (134 lb)   SpO2 97%   BMI 22.60 kg/m    GENERAL: Well developed, well nourished, alert, and in no apparent distress.  HEENT: Normocephalic, atraumatic. PERRL, EOMI. .  NECK: supple, no masses, no thyromegaly.  RESP:  Normal respiratory effort.  CTAB.  No rales, wheezes, rhonchi.  No cyanosis or clubbing.  CV: Normal S1, S2, regular rhythm, normal rate. No murmur.  No LE edema.   ABDOMEN:  non-distended.   SKIN: warm and dry. No rash.  NEURO: AAOx3.  Normal gait.  Fluent speech.  PSYCH: mentation appears normal.     Results:  PFTs: R 46%, FVC 89%, FEV1 52%, DLCO 78%.  Study demonstrates a moderately severe obstructive ventilatory defect with normal gas exchange.  Imaging (personally reviewed in clinic today):  CT Chest 6/2021:   1.  Old granulomatous disease.  2.  No noncalcified pulmonary nodules on today's examination.  3.  Emphysema.      Assessment and Plan:   Georgette Pereira is a 60 year old female with a  history of COPD and tobacco abuse who presents to pulmonary clinic today for follow up.  1) Smoking Cessation. We discussed that the only way to prevent further decline in lung function would be to quit smoking all together.  Complete cessation from all tobacco products was strongly encouraged again today.  A prescription for nicotine patches was sent to her pharmacy at her request.      2) COPD.  PFTs show moderately severe obstruction which is stable compared with previous studies.  Ms. Pereira continues to experience functionally limiting dyspnea but has been without AECOPD since our last visit.  She again reports somewhat confusing information regarding inhaler regimen and usage.  Ideally, she would be on triple therapy with LABA/LAMA/ICS consistently.  We reviewed today the differences between maintenance and rescue inhalers and that she should use Spiriva and Dulera every day as prescribed, regardless of symptoms.  She should continue to use albuterol as needed for rescue.  New prescriptions were provided as requested today as was a DME order for a new nebulizer machine.    DME (Durable Medical Equipment) Orders and Documentation  Orders Placed This Encounter   Procedures     Nebulizer and Supplies Order for DME - ONLY FOR DME      The patient was assessed and it was determined the patient is in need of the following listed DME Supplies/Equipment. Please complete supporting documentation below to demonstrate medical necessity.      Nebulizer Documentation  The patient was seen 07/20/2021. After assessment, the patient will need to be treated with ongoing nebulizer for treatment/management of COPD.    3) Dyspnea.  Work up including CT Chest and TTE were obtained in August 2019 for progressive dyspnea.  Both returned essentially unremarkable and did not elucidate any alternative etiologies of dyspnea outside of known COPD.  I continue to think that COPD is the most likely etiology of her dyspnea.  I do think  she would be an excellent candidate for pulmonary rehab and have placed an order for this today.  I also encouraged her to inquire into any smoking cessation resources that may be available through pulmonary rehab as well.    4) Pulmonary nodules/ Lung cancer screening.  Based on age and smoking history she probably does qualify for lung ca screening.  Last CT from June did not show any non-calcified pulmonary nodules. Repeat CT for surveillance/annual screening would be due June 2022.    Return to clinic in 6 months, sooner if worsening symptoms.     Up to date on Pneumovax (2012).    Ewa Decker MD  Pulmonary and Critical Care Medicine    The above note was dictated using voice recognition software and may include typographical errors. Please contact the author for any clarifications.

## 2021-07-21 ENCOUNTER — TELEPHONE (OUTPATIENT)
Dept: GASTROENTEROLOGY | Facility: OUTPATIENT CENTER | Age: 61
End: 2021-07-21

## 2021-07-21 NOTE — TELEPHONE ENCOUNTER
Caller: Georgette Pereira    Procedure: colonoscopy    Date of Procedure Cancelled: 07/23/21    Ordering Provider:Yue Bermudez    Reason for cancel: pt requested to reschedule    Rescheduled: yes     If rescheduled    Date: 08/23/21    Location: [place]    Note any change or update to original order/sedation: No changes

## 2021-07-22 ENCOUNTER — OFFICE VISIT (OUTPATIENT)
Dept: NEUROLOGY | Facility: CLINIC | Age: 61
End: 2021-07-22
Attending: INTERNAL MEDICINE
Payer: COMMERCIAL

## 2021-07-22 ENCOUNTER — PRE VISIT (OUTPATIENT)
Dept: NEUROLOGY | Facility: CLINIC | Age: 61
End: 2021-07-22

## 2021-07-22 VITALS
BODY MASS INDEX: 22.18 KG/M2 | HEART RATE: 74 BPM | HEIGHT: 66 IN | DIASTOLIC BLOOD PRESSURE: 97 MMHG | WEIGHT: 138 LBS | SYSTOLIC BLOOD PRESSURE: 138 MMHG | OXYGEN SATURATION: 95 % | RESPIRATION RATE: 16 BRPM

## 2021-07-22 DIAGNOSIS — F17.200 SMOKING: ICD-10-CM

## 2021-07-22 DIAGNOSIS — I73.9 PAD (PERIPHERAL ARTERY DISEASE) (H): ICD-10-CM

## 2021-07-22 DIAGNOSIS — I63.511 ACUTE ISCHEMIC RIGHT MCA STROKE (H): Primary | ICD-10-CM

## 2021-07-22 DIAGNOSIS — I66.01 MIDDLE CEREBRAL ARTERY STENOSIS, RIGHT: ICD-10-CM

## 2021-07-22 PROCEDURE — 99205 OFFICE O/P NEW HI 60 MIN: CPT | Performed by: PSYCHIATRY & NEUROLOGY

## 2021-07-22 RX ORDER — ROSUVASTATIN CALCIUM 10 MG/1
20 TABLET, COATED ORAL DAILY
Qty: 90 TABLET | Refills: 3 | Status: SHIPPED | OUTPATIENT
Start: 2021-07-22 | End: 2023-03-31

## 2021-07-22 RX ORDER — CLOPIDOGREL BISULFATE 75 MG/1
75 TABLET ORAL DAILY
Qty: 90 TABLET | Refills: 0 | Status: SHIPPED | OUTPATIENT
Start: 2021-07-22 | End: 2022-02-24

## 2021-07-22 ASSESSMENT — MIFFLIN-ST. JEOR: SCORE: 1212.71

## 2021-07-22 ASSESSMENT — PAIN SCALES - GENERAL: PAINLEVEL: NO PAIN (0)

## 2021-07-22 NOTE — LETTER
7/22/2021       RE: Georgette Pereira  [address]  [place] [address]     Dear Colleague,    Thank you for referring your patient, Georgette Pereira, to the [facility] [place] at [facility]. Please see a copy of my visit note below.    65  minutes spent on the date of the encounter doing chart review, review of test results, interpretation of tests, patient visit, documentation and discussion with family     Return in about 3 months (around 10/22/2021) for in person.    Calvin Murphy MD  [facility] [place]  _________________________________________________    [facility]    at [facility] [place]  _____________________________________________________________    Chief Complaint: Patient presents with:  Stroke: [facility] New Stroke    History of Present Illness: Georgette Pereira is a 60 year old female presenting as a new patient for stroke.     She is  R handed female with past medical hx of COPD , Emphysema, HTN, HLD and fibromyalgia for evaluation of headache and vision changes along with abnormal MRI. According to the patient she started having headache 6 months ago. She describes it as stabbing and pressure like sensation in the middle of her head without any associated migrainous symptoms or other focal neurological symptoms as well. This headache was constant and would get better for 1 hour after taking aspirin . She also reported an interval of time where she noticed bilateral shoulder weakness and heaviness to the point that it was difficult for her to lift her arms above her head that lasted for about 2 months. About 2 months ago she noticed episode of L eye vision loss which happened a total of 5 times lasting from 5 minutes to 1.5 hours. Majority were around 1 hour long. The vision change will start with a sensation  of scratching in her L eye which will be followed by whitening of her vision . It happened about 5 time same presentation and in the same eye. She is confident that it was the L eye that was a problem rather than visual field issue. This last for about 2 months. Currently her headache and vision changes are resolved. She reports that she was not taking her BP medication during this period of time as she ran out of her insurance and her BP were high. The highest it has been was 230. She was seen by PCP in June and reports that at that time she was started on anti hypertensive medications.     She denies any other hx of TIA or stroke like symptoms. For her work up MRI was ordered which showed R semi ovale stroke ?watershed. MRA head showed R MCA and L PCA stenosis. US carotid did not show high grade carotid stenosis. She had LDL of 88.     Impression:   Problem List Items Addressed This Visit     None      Visit Diagnoses     PAD (peripheral artery disease) (H)        Relevant Medications    rosuvastatin (CRESTOR) 10 MG tablet    Acute ischemic cerebrovascular accident (CVA) involving left middle cerebral artery territory (H)        Relevant Medications    clopidogrel (PLAVIX) 75 MG tablet    aspirin (ASA) 81 MG EC tablet    Other Relevant Orders    Echocardiogram Complete    Hemoglobin A1c    Cardiac Event Monitor Adult/Pediatric    Lipid panel    CT Angiogram head neck w & w/o contrast    Middle cerebral artery stenosis, right        Relevant Medications    aspirin (ASA) 81 MG EC tablet        60 year old female with past medical hx of COPD smoking HTN presents with headache and L eye vision changes. Her symptoms of headache and vision changes are interesting and she mentions some bilateral show weakness ( ? Polymyalgia rheumatica), her esr and crp were negative so the likelihood for it being GCA is low , plus she had an eye examination which was negative for papilledema or other findings suggestive of AION or PION.  "Given recurrent L eye changes which would be more explained by fixed stenosis rather than embolism we will obtain CT angio head and neck to evaluate carotid arteries.     Off note on her work up MRI showed R semiovale stroke (?watershed) , MRA showed R MCA and LPCA stenosis. Based on DWI ADC and FLAIR her stroke can be dated back to any where between last 14 days. If this a watershed type pattern then one possible hypothesis could be related to related hypotensive episode causing symptomatic stenosis. Currently potential etiology of stroke remains large artery atherosclerosis vs cardioembolism vs ESUS. Given presence of intracranial stenosis I will recommend treatment with aggressive medical management with dual antiplatelet for 90 days , targeting LDL to be <70 with high intensity statin and smoking cessation.     Plan:     Start Plavix 75 mg daily for a total of 90 days. While taking plavix you should be taking aspirin 81 mg daily with it as well.     After completion of 90 days go back to full dose 325 mg aspirin daily    Increase crestor from 10 mg daily to 20 mg daily.      Echo test is ordered    Lab work for LDL and HbA1c at 3 months    30 day heart monitor is ordered    CT angio head and neck to evaluate for carotid stenosis is also ordered    Smoking cessation    LDL goal is <70    Follow up in 3 months      Stroke Education provided.  She will call us with any questions.  For any acute neurologic deficits she was advised to  go directly to the hospital rather than call the clinic.    Calvin Murphy MD  Neurology  07/22/2021 4:15 PM  To page me or covering stroke neurology team member, click here: [facility]  Choose \"On Call\" tab at top, then search dropdown box for \"Neurology Adult\" & press Enter, look for Neuro ICU/Stroke    ___________________________________________________________________    Current Medications  Current Outpatient Medications   Medication Sig     albuterol (PROAIR HFA/PROVENTIL " HFA/VENTOLIN HFA) 108 (90 Base) MCG/ACT inhaler Inhale 2 puffs into the lungs every 6 hours as needed for shortness of breath / dyspnea or wheezing     albuterol (PROVENTIL) (2.5 MG/3ML) 0.083% neb solution Take 1 vial (2.5 mg) by nebulization 4 times daily     aspirin (ASA) 81 MG EC tablet Take 1 tablet (81 mg) by mouth daily     clopidogrel (PLAVIX) 75 MG tablet Take 1 tablet (75 mg) by mouth daily     DULoxetine (CYMBALTA) 30 MG capsule Take 1 capsule (30 mg) by mouth 2 times daily     estradiol (VAGIFEM) 10 MCG TABS vaginal tablet Place 1 tablet (10 mcg) vaginally twice a week     losartan-hydrochlorothiazide (HYZAAR) 100-25 MG tablet Take 1 tablet by mouth daily     metoprolol succinate ER (TOPROL-XL) 50 MG 24 hr tablet Take 1 tablet (50 mg) by mouth daily     mometasone-formoterol (DULERA) 200-5 MCG/ACT inhaler Inhale 2 puffs into the lungs 2 times daily     nicotine (NICODERM CQ) 21 MG/24HR 24 hr patch Place 1 patch onto the skin every 24 hours     rosuvastatin (CRESTOR) 10 MG tablet Take 2 tablets (20 mg) by mouth daily     tiotropium (SPIRIVA) 18 MCG inhaled capsule Inhale 1 capsule (18 mcg) into the lungs daily     triamcinolone (KENALOG) 0.1 % external cream Apply topically 2 times daily     No current facility-administered medications for this visit.       Past Medical History  Past Medical History:   Diagnosis Date     ACL (anterior cruciate ligament) tear 2/27/2012    Left      Chronic obstructive pulmonary disease with acute exacerbation (H) 11/23/2014    First hospitalization 11/23/14      CKD (chronic kidney disease) stage 3, GFR 30-59 ml/min 6/30/2020     CTS (carpal tunnel syndrome) 5/2010     Degenerative joint disease      Temporomandibular joint disorders, unspecified        Social History  Social History     Tobacco Use     Smoking status: Former Smoker     Packs/day: 1.00     Years: 32.00     Pack years: 32.00     Types: Cigarettes, Other     Quit date: 12/31/2012     Years since quitting:  "8.5     Smokeless tobacco: Never Used     Tobacco comment: using nicotine replacement: e cigarattes   Substance Use Topics     Alcohol use: No     Alcohol/week: 0.0 standard drinks     Drug use: No       Family History  Family History   Problem Relation Age of Onset     Breast Cancer Maternal Grandmother      Coronary Artery Disease Maternal Grandmother         MI at 41     Cerebrovascular Disease Mother      Hypertension Mother      Heart Disease Mother      Cancer Mother         Lung     Substance Abuse Mother      Cerebrovascular Disease Father      Hypertension Father      Cancer Father         Metastatic, primary lung/Prostate     Substance Abuse Father      Cancer Other      Substance Abuse Brother      Depression Son      Schizophrenia Brother      Bipolar Disorder Brother      Substance Abuse Brother      Aneurysm Paternal Uncle         Brain Aneurysms       ROS: 10 point relevant ROS neg other than the symptoms noted above in the HPI.    Physical Exam    BP (!) 138/97   Pulse 74   Resp 16   Ht 1.676 m (5' 6\")   Wt 62.6 kg (138 lb)   SpO2 95%   BMI 22.27 kg/m      General:  no acute distress  HEENT:  normocephalic/atraumatic  Pulmonary:  no respiratory distress    Neurologic  Mental Status:  alert, oriented x 3, follows commands, speech clear and fluent, naming and repetition normal  Cranial Nerves:  EOMI with normal smooth pursuit, facial movements symmetric, hearing not formally tested but intact to conversation, no dysarthria, shoulder shrug equal bilaterally, tongue protrusion midline  Motor:  no abnormal movements, able to move all limbs antigravity spontaneously with no signs of hemiparesis observed, no pronator drift  Reflexes:  Intact in bilateral upper and lower extrmeities  Sensory:  Intact to light touch in bilateral upper and lower extremities  Coordination:  normal finger-to-nose and heel-to-shin bilaterally without dysmetria, rapid alternating movements symmetric  Station/Gait:  " Normal    Neuroimaging: as per HPI. I personally reviewed those images and it shows R semioval stroke likely 14days old. MRA with RMCA and LPCA stenosis.    Labs:    No lab results found.     Recent Labs   Lab Test 06/15/21  0824 08/12/16  0851   CHOL 191 145   HDL 55 50   LDL 88 55   TRIG 240* 202*       Again, thank you for allowing me to participate in the care of your patient.      Sincerely,    Calvin Murphy MD

## 2021-07-22 NOTE — NURSING NOTE
Chief Complaint   Patient presents with     Stroke     [facility] New Stroke     Alan Salgado

## 2021-07-22 NOTE — PATIENT INSTRUCTIONS
Start Plavix 75 mg daily for a total of 90 days. While taking plavix you should be taking aspirin 81 mg daily with it as well.     After completion of 90 days go back to full dose 325 mg aspirin daily    Increase crestor from 10 mg daily to 20 mg daily.      Echo test is ordered    Lab work for LDL and HbA1c at 3 months    30 day heart monitor is ordered    CT angio head and neck to evaluate for carotid stenosis is also ordered    Smoking cessation    Long term blood pressure goal is <140/90    LDL goal is <70    Follow up in 3 months

## 2021-07-22 NOTE — PROGRESS NOTES
65  minutes spent on the date of the encounter doing chart review, review of test results, interpretation of tests, patient visit, documentation and discussion with family         Return in about 3 months (around 10/22/2021) for in person.    Calvin Murphy MD  [facility] NEUROLOGY CLINIC [place]    _________________________________________________    [facility]    at [facility]  _____________________________________________________________      Chief Complaint: Patient presents with:  Stroke: UMP New Stroke      History of Present Illness: Georgette Pereira is a 60 year old female presenting as a new patient for stroke.     She is  R handed female with past medical hx of COPD , Emphysema, HTN, HLD and fibromyalgia for evaluation of headache and vision changes along with abnormal MRI. According to the patient she started having headache 6 months ago. She describes it as stabbing and pressure like sensation in the middle of her head without any associated migrainous symptoms or other focal neurological symptoms as well. This headache was constant and would get better for 1 hour after taking aspirin . She also reported an interval of time where she noticed bilateral shoulder weakness and heaviness to the point that it was difficult for her to lift her arms above her head that lasted for about 2 months. About 2 months ago she noticed episode of L eye vision loss which happened a total of 5 times lasting from 5 minutes to 1.5 hours. Majority were around 1 hour long. The vision change will start with a sensation of scratching in her L eye which will be followed by whitening of her vision . It happened about 5 time same presentation and in the same eye. She is confident that it was the L eye that was a problem rather than visual field issue. This last for about 2 months. Currently her headache and vision changes are resolved. She  reports that she was not taking her BP medication during this period of time as she ran out of her insurance and her BP were high. The highest it has been was 230. She was seen by PCP in June and reports that at that time she was started on anti hypertensive medications.     She denies any other hx of TIA or stroke like symptoms. For her work up MRI was ordered which showed R semi ovale stroke ?watershed. MRA head showed R MCA and L PCA stenosis. US carotid did not show high grade carotid stenosis. She had LDL of 88.     Impression:   Problem List Items Addressed This Visit     None      Visit Diagnoses     PAD (peripheral artery disease) (H)        Relevant Medications    rosuvastatin (CRESTOR) 10 MG tablet    Acute ischemic cerebrovascular accident (CVA) involving left middle cerebral artery territory (H)        Relevant Medications    clopidogrel (PLAVIX) 75 MG tablet    aspirin (ASA) 81 MG EC tablet    Other Relevant Orders    Echocardiogram Complete    Hemoglobin A1c    Cardiac Event Monitor Adult/Pediatric    Lipid panel    CT Angiogram head neck w & w/o contrast    Middle cerebral artery stenosis, right        Relevant Medications    aspirin (ASA) 81 MG EC tablet        60 year old female with past medical hx of COPD smoking HTN presents with headache and L eye vision changes. Her symptoms of headache and vision changes are interesting and she mentions some bilateral show weakness ( ? Polymyalgia rheumatica), her esr and crp were negative so the likelihood for it being GCA is low , plus she had an eye examination which was negative for papilledema or other findings suggestive of AION or PION. Given recurrent L eye changes which would be more explained by fixed stenosis rather than embolism we will obtain CT angio head and neck to evaluate carotid arteries.     Off note on her work up MRI showed R semiovale stroke (?watershed) , MRA showed R MCA and LPCA stenosis. Based on DWI ADC and FLAIR her stroke can be  "dated back to any where between last 14 days. If this a watershed type pattern then one possible hypothesis could be related to related hypotensive episode causing symptomatic stenosis. Currently potential etiology of stroke remains large artery atherosclerosis vs cardioembolism vs ESUS. Given presence of intracranial stenosis I will recommend treatment with aggressive medical management with dual antiplatelet for 90 days , targeting LDL to be <70 with high intensity statin and smoking cessation.     Plan:     Start Plavix 75 mg daily for a total of 90 days. While taking plavix you should be taking aspirin 81 mg daily with it as well.     After completion of 90 days go back to full dose 325 mg aspirin daily    Increase crestor from 10 mg daily to 20 mg daily.      Echo test is ordered    Lab work for LDL and HbA1c at 3 months    30 day heart monitor is ordered    CT angio head and neck to evaluate for carotid stenosis is also ordered    Smoking cessation    LDL goal is <70    Follow up in 3 months      Stroke Education provided.  She will call us with any questions.  For any acute neurologic deficits she was advised to  go directly to the hospital rather than call the clinic.    Calvin Murphy MD  Neurology  07/22/2021 4:15 PM  To page me or covering stroke neurology team member, click here: AMCOM  Choose \"On Call\" tab at top, then search dropdown box for \"Neurology Adult\" & press Enter, look for Neuro ICU/Stroke    ___________________________________________________________________    Current Medications  Current Outpatient Medications   Medication Sig     albuterol (PROAIR HFA/PROVENTIL HFA/VENTOLIN HFA) 108 (90 Base) MCG/ACT inhaler Inhale 2 puffs into the lungs every 6 hours as needed for shortness of breath / dyspnea or wheezing     albuterol (PROVENTIL) (2.5 MG/3ML) 0.083% neb solution Take 1 vial (2.5 mg) by nebulization 4 times daily     aspirin (ASA) 81 MG EC tablet Take 1 tablet (81 mg) by mouth daily     " clopidogrel (PLAVIX) 75 MG tablet Take 1 tablet (75 mg) by mouth daily     DULoxetine (CYMBALTA) 30 MG capsule Take 1 capsule (30 mg) by mouth 2 times daily     estradiol (VAGIFEM) 10 MCG TABS vaginal tablet Place 1 tablet (10 mcg) vaginally twice a week     losartan-hydrochlorothiazide (HYZAAR) 100-25 MG tablet Take 1 tablet by mouth daily     metoprolol succinate ER (TOPROL-XL) 50 MG 24 hr tablet Take 1 tablet (50 mg) by mouth daily     mometasone-formoterol (DULERA) 200-5 MCG/ACT inhaler Inhale 2 puffs into the lungs 2 times daily     nicotine (NICODERM CQ) 21 MG/24HR 24 hr patch Place 1 patch onto the skin every 24 hours     rosuvastatin (CRESTOR) 10 MG tablet Take 2 tablets (20 mg) by mouth daily     tiotropium (SPIRIVA) 18 MCG inhaled capsule Inhale 1 capsule (18 mcg) into the lungs daily     triamcinolone (KENALOG) 0.1 % external cream Apply topically 2 times daily     No current facility-administered medications for this visit.       Past Medical History  Past Medical History:   Diagnosis Date     ACL (anterior cruciate ligament) tear 2012    Left      Chronic obstructive pulmonary disease with acute exacerbation (H) 2014    First hospitalization 14      CKD (chronic kidney disease) stage 3, GFR 30-59 ml/min 2020     CTS (carpal tunnel syndrome) 2010     Degenerative joint disease      Temporomandibular joint disorders, unspecified        Social History  Social History     Tobacco Use     Smoking status: Former Smoker     Packs/day: 1.00     Years: 32.00     Pack years: 32.00     Types: Cigarettes, Other     Quit date: 2012     Years since quittin.5     Smokeless tobacco: Never Used     Tobacco comment: using nicotine replacement: e cigarattes   Substance Use Topics     Alcohol use: No     Alcohol/week: 0.0 standard drinks     Drug use: No       Family History  Family History   Problem Relation Age of Onset     Breast Cancer Maternal Grandmother      Coronary Artery Disease  "Maternal Grandmother         MI at 41     Cerebrovascular Disease Mother      Hypertension Mother      Heart Disease Mother      Cancer Mother         Lung     Substance Abuse Mother      Cerebrovascular Disease Father      Hypertension Father      Cancer Father         Metastatic, primary lung/Prostate     Substance Abuse Father      Cancer Other      Substance Abuse Brother      Depression Son      Schizophrenia Brother      Bipolar Disorder Brother      Substance Abuse Brother      Aneurysm Paternal Uncle         Brain Aneurysms       ROS: 10 point relevant ROS neg other than the symptoms noted above in the HPI.    Physical Exam    BP (!) 138/97   Pulse 74   Resp 16   Ht 1.676 m (5' 6\")   Wt 62.6 kg (138 lb)   SpO2 95%   BMI 22.27 kg/m      General:  no acute distress  HEENT:  normocephalic/atraumatic  Pulmonary:  no respiratory distress    Neurologic  Mental Status:  alert, oriented x 3, follows commands, speech clear and fluent, naming and repetition normal  Cranial Nerves:  EOMI with normal smooth pursuit, facial movements symmetric, hearing not formally tested but intact to conversation, no dysarthria, shoulder shrug equal bilaterally, tongue protrusion midline  Motor:  no abnormal movements, able to move all limbs antigravity spontaneously with no signs of hemiparesis observed, no pronator drift  Reflexes:  Intact in bilateral upper and lower extrmeities  Sensory:  Intact to light touch in bilateral upper and lower extremities  Coordination:  normal finger-to-nose and heel-to-shin bilaterally without dysmetria, rapid alternating movements symmetric  Station/Gait:  Normal    Neuroimaging: as per HPI. I personally reviewed those images and it shows R semioval stroke likely 14days old. MRA with RMCA and LPCA stenosis.    Labs:    No lab results found.     Recent Labs   Lab Test 06/15/21  0824 08/12/16  0851   CHOL 191 145   HDL 55 50   LDL 88 55   TRIG 240* 202*       No lab results found.    No lab " results found.

## 2021-07-28 ENCOUNTER — TELEPHONE (OUTPATIENT)
Dept: NEUROLOGY | Facility: CLINIC | Age: 61
End: 2021-07-28

## 2021-07-28 NOTE — TELEPHONE ENCOUNTER
Called Amie on Monday 7/26 at 12:23 pm, Tuesday 7/27 at 2:50pm and Wednesday 7/28 at 2:18 pm, LVM regarding Ziopatch monitor per Dr. Murphy.     Called Thurdsay 7/29 at 10:07 LMV. Also called significant other, did not leave voicemail.    Bryce George

## 2021-08-04 ENCOUNTER — MYC MEDICAL ADVICE (OUTPATIENT)
Dept: INTERNAL MEDICINE | Facility: CLINIC | Age: 61
End: 2021-08-04

## 2021-08-04 ENCOUNTER — TELEPHONE (OUTPATIENT)
Dept: FAMILY MEDICINE | Facility: CLINIC | Age: 61
End: 2021-08-04

## 2021-08-04 DIAGNOSIS — F43.23 ADJUSTMENT DISORDER WITH MIXED ANXIETY AND DEPRESSED MOOD: Primary | ICD-10-CM

## 2021-08-05 RX ORDER — DIAZEPAM 10 MG
TABLET ORAL
Qty: 10 TABLET | Refills: 0 | Status: SHIPPED | OUTPATIENT
Start: 2021-08-05 | End: 2021-11-18

## 2021-08-05 NOTE — TELEPHONE ENCOUNTER
Pt needing Event monitor to wear for 30 days, Please call and schedule patient to be hooked up at [facility] cardiology [address] [place] [address]. Routing to cardiology team.       Myrna HAWLEY CMA ([facility])

## 2021-08-10 ENCOUNTER — MYC MEDICAL ADVICE (OUTPATIENT)
Dept: INTERNAL MEDICINE | Facility: CLINIC | Age: 61
End: 2021-08-10

## 2021-08-10 DIAGNOSIS — N89.8 VAGINAL ITCHING: Primary | ICD-10-CM

## 2021-08-11 ENCOUNTER — ANCILLARY PROCEDURE (OUTPATIENT)
Dept: CT IMAGING | Facility: CLINIC | Age: 61
End: 2021-08-11
Attending: PSYCHIATRY & NEUROLOGY
Payer: COMMERCIAL

## 2021-08-11 DIAGNOSIS — I63.511 ACUTE ISCHEMIC RIGHT MCA STROKE (H): ICD-10-CM

## 2021-08-11 PROCEDURE — 70498 CT ANGIOGRAPHY NECK: CPT | Mod: TC | Performed by: RADIOLOGY

## 2021-08-11 PROCEDURE — 70496 CT ANGIOGRAPHY HEAD: CPT | Mod: TC | Performed by: RADIOLOGY

## 2021-08-11 RX ORDER — IOPAMIDOL 755 MG/ML
100 INJECTION, SOLUTION INTRAVASCULAR ONCE
Status: DISCONTINUED | OUTPATIENT
Start: 2021-08-11 | End: 2021-08-11

## 2021-08-11 RX ORDER — IOPAMIDOL 755 MG/ML
70 INJECTION, SOLUTION INTRAVASCULAR ONCE
Status: COMPLETED | OUTPATIENT
Start: 2021-08-11 | End: 2021-08-11

## 2021-08-11 RX ADMIN — IOPAMIDOL 70 ML: 755 INJECTION, SOLUTION INTRAVASCULAR at 14:05

## 2021-08-13 ENCOUNTER — ANCILLARY PROCEDURE (OUTPATIENT)
Dept: CARDIOLOGY | Facility: CLINIC | Age: 61
End: 2021-08-13
Attending: PSYCHIATRY & NEUROLOGY
Payer: COMMERCIAL

## 2021-08-13 DIAGNOSIS — I63.511 ACUTE ISCHEMIC RIGHT MCA STROKE (H): ICD-10-CM

## 2021-08-13 LAB — LVEF ECHO: NORMAL

## 2021-08-13 PROCEDURE — 93270 REMOTE 30 DAY ECG REV/REPORT: CPT | Performed by: INTERNAL MEDICINE

## 2021-08-13 PROCEDURE — 93306 TTE W/DOPPLER COMPLETE: CPT | Performed by: INTERNAL MEDICINE

## 2021-08-13 PROCEDURE — 93272 ECG/REVIEW INTERPRET ONLY: CPT | Performed by: INTERNAL MEDICINE

## 2021-08-13 RX ORDER — ACYCLOVIR 200 MG/1
16 CAPSULE ORAL ONCE
Status: COMPLETED | OUTPATIENT
Start: 2021-08-13 | End: 2021-08-13

## 2021-08-13 RX ADMIN — ACYCLOVIR 14 ML: 200 CAPSULE ORAL at 13:19

## 2021-08-13 NOTE — PROGRESS NOTES
Per Dr. Murphy, patient to have Biotel Cardionet monitor placed.  Diagnosis: I63.511  Monitor placed: Yes  Patient Instructed: Yes  Patient verbalized understanding: Yes    Card ID: [id number]

## 2021-08-13 NOTE — PATIENT INSTRUCTIONS
We have applied a heart monitor (MCOT Patch) for you to wear for 30 days.  You may remove the heart monitor on 09/12/21 at 200pm.  Please see the 2-page instructional sheet for further information, as well as instructions for removal of the heart monitor and return mailing directions.  If you should have questions regarding your monitor, please call [facility] at [phone].  We will contact you with your results (please see result notification details at bottom of summary).

## 2021-08-18 ENCOUNTER — TELEPHONE (OUTPATIENT)
Dept: GASTROENTEROLOGY | Facility: CLINIC | Age: 61
End: 2021-08-18

## 2021-08-18 NOTE — TELEPHONE ENCOUNTER
Caller: Writer left message regarding rescheduling procedure due to blood thinners.    Procedure: Colonoscopy    Date of Procedure Cancelled: 8/23    Ordering Provider:Yue Bermudez MD    Reason for cancel: ----- Message from Michelle Camarillo RN sent at 8/18/2021 11:01 AM CDT -----  Regarding: Needs to reschedule 8/23 colonoscopy  Patient will need to reschedule her 8/23 colonoscopy.  She had a stroke 7/22/2021 and was put on a blood thinner that should NOT be stopped for 90 days.  So if her MD has ok'd her to have the procedure at the end of October or later, she can be scheduled then.  I left a voicemail with patient stating this and also gave her the scheduling phone # to call.    Thank You,  Michelle Camarillo RN      Rescheduled: N, left message to return call     If rescheduled:    Date:    Location:    Note any change or update to original order/sedation:

## 2021-10-23 ENCOUNTER — HEALTH MAINTENANCE LETTER (OUTPATIENT)
Age: 61
End: 2021-10-23

## 2021-10-29 ENCOUNTER — HOSPITAL ENCOUNTER (OUTPATIENT)
Facility: AMBULATORY SURGERY CENTER | Age: 61
End: 2021-10-29
Attending: SPECIALIST | Admitting: SPECIALIST
Payer: COMMERCIAL

## 2021-10-29 ENCOUNTER — TELEPHONE (OUTPATIENT)
Dept: GASTROENTEROLOGY | Facility: CLINIC | Age: 61
End: 2021-10-29

## 2021-10-29 DIAGNOSIS — Z12.11 SCREEN FOR COLON CANCER: Primary | ICD-10-CM

## 2021-10-29 DIAGNOSIS — Z11.59 ENCOUNTER FOR SCREENING FOR OTHER VIRAL DISEASES: ICD-10-CM

## 2021-10-30 NOTE — TELEPHONE ENCOUNTER
Scheduling Details    Which Colonoscopy Prep was Sent?: EXTENDED  Procedure Scheduled: COLON  Provider/Surgeon: MONIE  Date of Procedure: 11/24 1015PM ARRIVAL  Location:   Caller (Please ask for phone number if not scheduled by patient): [facility]      Sedation Type: CS  Conscious Sedation- Needs  for 6 hours after the procedure  MAC/General-Needs  for 24 hours after procedure    Pre-op Required at [facility] [place], [place] and OR for MAC sedation:   (if yes advise patient they will need a pre-op prior to procedure)      Is patient on blood thinners? -ASPRIN? (If yes- inform patient to follow up with PCP or provider for follow up instructions)     Informed patient they will need an adult  Y  Cannot take any type of public or medical transportation alone    Pre-Procedure Covid test to be completed at [facility] or Externally: 11/20 145 AN LAB    Confirmed Nurse will call to complete assessment Y    Additional comments:

## 2021-11-16 DIAGNOSIS — F43.23 ADJUSTMENT DISORDER WITH MIXED ANXIETY AND DEPRESSED MOOD: ICD-10-CM

## 2021-11-18 RX ORDER — DIAZEPAM 10 MG
TABLET ORAL
Qty: 10 TABLET | Refills: 1 | Status: SHIPPED | OUTPATIENT
Start: 2021-11-18 | End: 2022-07-06

## 2021-12-08 DIAGNOSIS — I10 ESSENTIAL HYPERTENSION WITH GOAL BLOOD PRESSURE LESS THAN 140/90: ICD-10-CM

## 2021-12-08 DIAGNOSIS — N95.2 ATROPHIC VAGINITIS: ICD-10-CM

## 2021-12-09 RX ORDER — LOSARTAN POTASSIUM AND HYDROCHLOROTHIAZIDE 25; 100 MG/1; MG/1
1 TABLET ORAL DAILY
Qty: 90 TABLET | Refills: 1 | Status: SHIPPED | OUTPATIENT
Start: 2021-12-09 | End: 2022-05-18

## 2021-12-09 RX ORDER — TRIAMCINOLONE ACETONIDE 1 MG/G
CREAM TOPICAL
Qty: 80 G | Refills: 1 | Status: SHIPPED | OUTPATIENT
Start: 2021-12-09 | End: 2023-04-20

## 2021-12-14 ENCOUNTER — HOSPITAL ENCOUNTER (OUTPATIENT)
Facility: AMBULATORY SURGERY CENTER | Age: 61
End: 2021-12-14
Attending: INTERNAL MEDICINE
Payer: COMMERCIAL

## 2021-12-14 ENCOUNTER — TELEPHONE (OUTPATIENT)
Dept: GASTROENTEROLOGY | Facility: CLINIC | Age: 61
End: 2021-12-14
Payer: COMMERCIAL

## 2021-12-14 DIAGNOSIS — Z12.11 COLON CANCER SCREENING: Primary | ICD-10-CM

## 2021-12-18 ENCOUNTER — HEALTH MAINTENANCE LETTER (OUTPATIENT)
Age: 61
End: 2021-12-18

## 2021-12-20 DIAGNOSIS — Z11.59 ENCOUNTER FOR SCREENING FOR OTHER VIRAL DISEASES: ICD-10-CM

## 2021-12-24 ENCOUNTER — NURSE TRIAGE (OUTPATIENT)
Dept: NURSING | Facility: CLINIC | Age: 61
End: 2021-12-24
Payer: COMMERCIAL

## 2021-12-24 ENCOUNTER — E-VISIT (OUTPATIENT)
Dept: URGENT CARE | Facility: CLINIC | Age: 61
End: 2021-12-24
Payer: COMMERCIAL

## 2021-12-24 DIAGNOSIS — N94.9 VAGINAL DISCOMFORT: Primary | ICD-10-CM

## 2021-12-24 PROCEDURE — 99421 OL DIG E/M SVC 5-10 MIN: CPT | Performed by: NURSE PRACTITIONER

## 2021-12-24 NOTE — TELEPHONE ENCOUNTER
Patient calling with on-going symptoms of vaginal infection. Has severe itching, redness and pain and burning with urination. Minimal vaginal discharge.    Stated she's done two over-the-counter vaginal treatments as well as using  triamcinolone cream and nothing has helped. PCP has referred her to OB-GYN for further treatment.    Per protocol, recommendations are for patient to see physician within 24 hours. Due to the holidays urgent care is not an option. Advised patient to schedule an e-visit through [employer]. Patient verbalizes understanding and agrees with plan of care.    Sumaya Flowers RN  12/24/21 3:14 PM  [facility] Nurse Advisor      Reason for Disposition    MODERATE-SEVERE itching (i.e., interferes with school, work, or sleep)    Additional Information    Negative: [1] SEVERE pain AND [2] not improved 2 hours after pain medicine    Negative: [1] Something is hanging out of the vagina AND [2] can't easily be pushed back inside    Negative: Sounds like a life-threatening emergency to the triager    Negative: Followed a genital area injury    Negative: Foreign body in vagina (e.g., tampon)    Negative: Vaginal bleeding is main symptom    Negative: Vaginal discharge is main symptom    Negative: Pain or burning with passing urine (urination) is main symptom    Negative: Menstrual cramps is main symptom    Negative: Abdomen pain is main symptom    Negative: Pubic lice suspected    Negative: Itching or rash of external female genital area (vulva)    Negative: Labor suspected    Negative: Patient sounds very sick or weak to the triager    Negative: [1] Genital area looks infected (e.g., draining sore, spreading redness) AND [2] fever    Protocols used: VAGINAL SYMPTOMS-A-AH  COVID 19 Nurse Triage Plan/Patient Instructions    Please be aware that novel coronavirus (COVID-19) may be circulating in the community. If you develop symptoms such as fever, cough, or SOB or if you have concerns about the presence  of another infection including coronavirus (COVID-19), please contact your health care provider or visit [email or web address].     Disposition/Instructions    Virtual Visit with provider recommended. Reference Visit Selection Guide.    Thank you for taking steps to prevent the spread of this virus.  o Limit your contact with others.  o Wear a simple mask to cover your cough.  o Wash your hands well and often.    Resources    [facility]: About COVID-19: [email or web address]    CDC: What to Do If You're Sick: www.cdc.gov/coronavirus/2019-ncov/about/steps-when-sick.html    CDC: Ending Home Isolation: www.cdc.gov/coronavirus/2019-ncov/hcp/disposition-in-home-patients.html     CDC: Caring for Someone: www.cdc.gov/coronavirus/2019-ncov/if-you-are-sick/care-for-someone.html     [facility]: Interim Guidance for Hospital Discharge to Home: www.[facility].[place]./diseases/coronavirus/hcp/hospdischarge.pdf    [facility] clinical trials (COVID-19 research studies): clinicalaffairs.[facility]-clinical-trials     Below are the COVID-19 hotlines at the [place] [facility] ([facility]). Interpreters are available.   o For health questions: Call 765-515-6802 or 1-576.295.1889 (7 a.m. to 7 p.m.)  o For questions about schools and childcare: Call 709-357-3701 or 1-816.659.9136 (7 a.m. to 7 p.m.)

## 2022-01-10 RX ORDER — BISACODYL 5 MG
2 TABLET, DELAYED RELEASE (ENTERIC COATED) ORAL SEE ADMIN INSTRUCTIONS
Qty: 2 TABLET | Refills: 0 | Status: SHIPPED | OUTPATIENT
Start: 2022-01-10 | End: 2022-01-14 | Stop reason: HOSPADM

## 2022-01-14 RX ORDER — ONDANSETRON 4 MG/1
4 TABLET, ORALLY DISINTEGRATING ORAL EVERY 6 HOURS PRN
Status: CANCELLED | OUTPATIENT
Start: 2022-01-14

## 2022-01-14 RX ORDER — ONDANSETRON 2 MG/ML
4 INJECTION INTRAMUSCULAR; INTRAVENOUS EVERY 6 HOURS PRN
Status: CANCELLED | OUTPATIENT
Start: 2022-01-14

## 2022-01-14 RX ORDER — NALOXONE HYDROCHLORIDE 0.4 MG/ML
0.2 INJECTION, SOLUTION INTRAMUSCULAR; INTRAVENOUS; SUBCUTANEOUS
Status: CANCELLED | OUTPATIENT
Start: 2022-01-14

## 2022-01-14 RX ORDER — NALOXONE HYDROCHLORIDE 0.4 MG/ML
0.4 INJECTION, SOLUTION INTRAMUSCULAR; INTRAVENOUS; SUBCUTANEOUS
Status: CANCELLED | OUTPATIENT
Start: 2022-01-14

## 2022-01-14 RX ORDER — FLUMAZENIL 0.1 MG/ML
0.2 INJECTION, SOLUTION INTRAVENOUS
Status: CANCELLED | OUTPATIENT
Start: 2022-01-14 | End: 2022-01-14

## 2022-01-14 RX ORDER — ONDANSETRON 2 MG/ML
4 INJECTION INTRAMUSCULAR; INTRAVENOUS
Status: CANCELLED | OUTPATIENT
Start: 2022-01-14

## 2022-01-14 RX ORDER — LIDOCAINE 40 MG/G
CREAM TOPICAL
Status: CANCELLED | OUTPATIENT
Start: 2022-01-14

## 2022-01-14 RX ORDER — PROCHLORPERAZINE MALEATE 10 MG
10 TABLET ORAL EVERY 6 HOURS PRN
Status: CANCELLED | OUTPATIENT
Start: 2022-01-14

## 2022-01-20 ENCOUNTER — VIRTUAL VISIT (OUTPATIENT)
Dept: NEUROLOGY | Facility: CLINIC | Age: 62
End: 2022-01-20
Payer: COMMERCIAL

## 2022-01-20 VITALS — DIASTOLIC BLOOD PRESSURE: 108 MMHG | SYSTOLIC BLOOD PRESSURE: 173 MMHG | OXYGEN SATURATION: 97 %

## 2022-01-20 DIAGNOSIS — R51.9 SCALP TENDERNESS: ICD-10-CM

## 2022-01-20 DIAGNOSIS — I73.9 PAD (PERIPHERAL ARTERY DISEASE) (H): Primary | ICD-10-CM

## 2022-01-20 DIAGNOSIS — I63.511 ACUTE ISCHEMIC RIGHT MCA STROKE (H): ICD-10-CM

## 2022-01-20 DIAGNOSIS — I66.01 MIDDLE CEREBRAL ARTERY STENOSIS, RIGHT: ICD-10-CM

## 2022-01-20 PROCEDURE — 99214 OFFICE O/P EST MOD 30 MIN: CPT | Mod: 95 | Performed by: PSYCHIATRY & NEUROLOGY

## 2022-01-20 NOTE — PROGRESS NOTES
Amie is a 61 year old who is being evaluated via a billable video visit.      How would you like to obtain your AVS? MyChart  If the video visit is dropped, the invitation should be resent by: Send to e-mail at: [email or web address]  Will anyone else be joining your video visit? No      Video Start Time: 1002  Video-Visit Details    Type of service:  Video Visit    Video End Time:1018    Originating Location (pt. Location): Home    Distant Location (provider location):  [facility] NEUROLOGY [facility]     Platform used for Video Visit: [employer]

## 2022-01-20 NOTE — LETTER
1/20/2022       RE: Georgette Pereira  [address]  [place] [address]     Dear Colleague,    Thank you for referring your patient, Georgette Pereira, to the [facility]UR[facility] [place] at [facility]. Please see a copy of my visit note below.      Assessment & Plan   Problem List Items Addressed This Visit     None      Visit Diagnoses     PAD (peripheral artery disease) (H)    -  Primary    Middle cerebral artery stenosis, right        Acute ischemic right MCA stroke (H)        Scalp tenderness        Relevant Orders    Erythrocyte sedimentation rate auto    CRP inflammation               38 minutes spent on the date of the encounter doing chart review, review of test results, interpretation of tests, patient visit and documentation            Return in about 6 months (around 7/20/2022) for Follow up.    Calvin Murphy MD  [facility] [place]    _________________________________________________    [facility]    at [facility]  _____________________________________________________________      Chief Complaint: Patient presents with:  RECHECK: UMP RETURN      History of Present Illness: Georgette Pereira is a 60 year old female presenting as a new patient for stroke.     She is  R handed female with past medical hx of COPD , Emphysema, HTN, HLD and fibromyalgia for evaluation of headache and vision changes along with abnormal MRI. According to the patient she started having headache 6 months ago. She describes it as stabbing and pressure like sensation in the middle of her head without any associated migrainous symptoms or other focal neurological symptoms as well. This headache was constant and would get better for 1 hour after taking aspirin . She also reported an interval of time where she noticed bilateral shoulder  weakness and heaviness to the point that it was difficult for her to lift her arms above her head that lasted for about 2 months. About 2 months ago she noticed episode of L eye vision loss which happened a total of 5 times lasting from 5 minutes to 1.5 hours. Majority were around 1 hour long. The vision change will start with a sensation of scratching in her L eye which will be followed by whitening of her vision . It happened about 5 time same presentation and in the same eye. She is confident that it was the L eye that was a problem rather than visual field issue. This last for about 2 months. Currently her headache and vision changes are resolved. She reports that she was not taking her BP medication during this period of time as she ran out of her insurance and her BP were high. The highest it has been was 230. She was seen by PCP in June and reports that at that time she was started on anti hypertensive medications.     She denies any other hx of TIA or stroke like symptoms. For her work up MRI was ordered which showed R semi ovale stroke ?watershed. MRA head showed R MCA and L PCA stenosis. US carotid did not show high grade carotid stenosis. She had LDL of 88.     She was seen in clinic for headache and abnormal MRI showing acute infarct with intracranial stenosis. Follow up CTA showed similar findings. No extracranial carotid disease. She reports her headache and vision changes has resolved since then. She started noticing L scalp tenderness starting 3 days ago. She denies any vision changes associated with it. She reports muscle ache but no night sweates or fevers. She reports compliance with her medications without any side effects.     Impression:   Problem List Items Addressed This Visit     None      Visit Diagnoses     PAD (peripheral artery disease) (H)    -  Primary    Middle cerebral artery stenosis, right        Acute ischemic right MCA stroke (H)        Scalp tenderness        Relevant Orders     "Erythrocyte sedimentation rate auto    CRP inflammation        60 year old female with past medical hx of COPD smoking HTN presents with headache and L eye vision changes. Her symptoms of headache and vision changes are interesting and she mentions some bilateral show weakness ( ? Polymyalgia rheumatica), her esr and crp were negative so the likelihood for it being GCA is low , plus she had an eye examination which was negative for papilledema or other findings suggestive of AION or PION. Given recurrent L eye changes , CTA was obtain which was neg for extracranial disease however showed similar intracranial stenosis.       Plan:     Take full doses 325 mg aspirin daily    Crestor at 10 mg daily    Echo test is unremarkable    Lab work for LDL and HbA1c to be completed today    30 day heart monitor is neg for Afib    Smoking cessation    LDL goal is <70    Follow up in 6 months    ESR CRP for scalp tenderness    If negative will refer to ophthalmology for evaluation and need for temporal artery biopsy      Stroke Education provided.  She will call us with any questions.  For any acute neurologic deficits she was advised to  go directly to the hospital rather than call the clinic.    Calvin Murphy MD  Neurology  01/20/2022 11:03 AM  To page me or covering stroke neurology team member, click here: AMCOM  Choose \"On Call\" tab at top, then search dropdown box for \"Neurology Adult\" & press Enter, look for Neuro ICU/Stroke    ___________________________________________________________________    Current Medications  Current Outpatient Medications   Medication Sig     albuterol (PROAIR HFA/PROVENTIL HFA/VENTOLIN HFA) 108 (90 Base) MCG/ACT inhaler Inhale 2 puffs into the lungs every 6 hours as needed for shortness of breath / dyspnea or wheezing     albuterol (PROVENTIL) (2.5 MG/3ML) 0.083% neb solution Take 1 vial (2.5 mg) by nebulization 4 times daily     diazepam (VALIUM) 10 MG tablet TAKE 1/2 TO 1 TABLET BY MOUTH " DAILY AS NEEDED FOR ANXIETY     DULoxetine (CYMBALTA) 30 MG capsule Take 1 capsule (30 mg) by mouth 2 times daily     estradiol (VAGIFEM) 10 MCG TABS vaginal tablet Place 1 tablet (10 mcg) vaginally twice a week     losartan-hydrochlorothiazide (HYZAAR) 100-25 MG tablet TAKE 1 TABLET BY MOUTH DAILY     metoprolol succinate ER (TOPROL-XL) 50 MG 24 hr tablet Take 1 tablet (50 mg) by mouth daily     mometasone-formoterol (DULERA) 200-5 MCG/ACT inhaler Inhale 2 puffs into the lungs 2 times daily     nicotine (NICODERM CQ) 21 MG/24HR 24 hr patch Place 1 patch onto the skin every 24 hours     rosuvastatin (CRESTOR) 10 MG tablet Take 2 tablets (20 mg) by mouth daily     tiotropium (SPIRIVA) 18 MCG inhaled capsule Inhale 1 capsule (18 mcg) into the lungs daily     triamcinolone (KENALOG) 0.1 % external cream APPLY TOPICALLY TO THE AFFECTED AREA TWICE DAILY     clopidogrel (PLAVIX) 75 MG tablet Take 1 tablet (75 mg) by mouth daily     No current facility-administered medications for this visit.       Past Medical History  Past Medical History:   Diagnosis Date     ACL (anterior cruciate ligament) tear 2012    Left      Chronic obstructive pulmonary disease with acute exacerbation (H) 2014    First hospitalization 14      CKD (chronic kidney disease) stage 3, GFR 30-59 ml/min 2020     CTS (carpal tunnel syndrome) 2010     Degenerative joint disease      Temporomandibular joint disorders, unspecified        Social History  Social History     Tobacco Use     Smoking status: Former Smoker     Packs/day: 1.00     Years: 32.00     Pack years: 32.00     Types: Cigarettes, Other     Quit date: 2012     Years since quittin.0     Smokeless tobacco: Never Used     Tobacco comment: using nicotine replacement: e cigarattes   Substance Use Topics     Alcohol use: No     Alcohol/week: 0.0 standard drinks     Drug use: No       Family History  Family History   Problem Relation Age of Onset     Breast Cancer  Maternal Grandmother      Coronary Artery Disease Maternal Grandmother         MI at 41     Cerebrovascular Disease Mother      Hypertension Mother      Heart Disease Mother      Cancer Mother         Lung     Substance Abuse Mother      Cerebrovascular Disease Father      Hypertension Father      Cancer Father         Metastatic, primary lung/Prostate     Substance Abuse Father      Cancer Other      Substance Abuse Brother      Depression Son      Schizophrenia Brother      Bipolar Disorder Brother      Substance Abuse Brother      Aneurysm Paternal Uncle         Brain Aneurysms       ROS: 10 point relevant ROS neg other than the symptoms noted above in the HPI.    Physical Exam    BP (!) 173/108   SpO2 97%     Neurologic  Mental Status:  alert, oriented x 3, follows commands, speech clear and fluent, naming and repetition normal  Cranial Nerves:  EOMI with normal smooth pursuit, facial movements symmetric, hearing not formally tested but intact to conversation, no dysarthria, shoulder shrug equal bilaterally, tongue protrusion midline  Motor:  no abnormal movements, able to move all limbs antigravity spontaneously with no signs of hemiparesis observed, no pronator drift  Reflexes:  Deferred due to telestroke  Sensory:  Deferred  Coordination: Deferred due to telestroke  Station/Gait:  Deferred    Neuroimaging: as per HPI. I personally reviewed those images and it shows R semioval stroke likely 14days old. MRA with RMCA and LPCA stenosis.    Labs:    No lab results found.     Recent Labs   Lab Test 06/15/21  0824 08/12/16  0851   CHOL 191 145   HDL 55 50   LDL 88 55   TRIG 240* 202*       No lab results found.    No lab results found.      Again, thank you for allowing me to participate in the care of your patient.      Sincerely,    Calvin Murphy MD

## 2022-01-20 NOTE — PROGRESS NOTES
Amie is a 61 year old who is being evaluated via a billable video visit.      How would you like to obtain your AVS? MyChart  If the video visit is dropped, the invitation should be resent by: Send to e-mail at: [email or web address]  Will anyone else be joining your video visit? No      Video Start Time: 1002  Video-Visit Details    Type of service:  Video Visit    Video End Time:1018    Originating Location (pt. Location): Home    Distant Location (provider location):  [facility] NEUROLOGY [facility]     Platform used for Video Visit: [employer]      Assessment & Plan   Problem List Items Addressed This Visit     None      Visit Diagnoses     PAD (peripheral artery disease) (H)    -  Primary    Middle cerebral artery stenosis, right        Acute ischemic right MCA stroke (H)        Scalp tenderness        Relevant Orders    Erythrocyte sedimentation rate auto    CRP inflammation               38 minutes spent on the date of the encounter doing chart review, review of test results, interpretation of tests, patient visit and documentation            Return in about 6 months (around 7/20/2022) for Follow up.    Calvin Murphy MD  [facility]UROLOG[facility]    _________________________________________________    [facility]    at [facility]  _____________________________________________________________      Chief Complaint: Patient presents with:  RECHECK: UMP RETURN      History of Present Illness: Georgette Pereira is a 60 year old female presenting as a new patient for stroke.     She is  R handed female with past medical hx of COPD , Emphysema, HTN, HLD and fibromyalgia for evaluation of headache and vision changes along with abnormal MRI. According to the patient she started having headache 6 months ago. She describes it as stabbing and pressure like sensation in the middle of her head without any  associated migrainous symptoms or other focal neurological symptoms as well. This headache was constant and would get better for 1 hour after taking aspirin . She also reported an interval of time where she noticed bilateral shoulder weakness and heaviness to the point that it was difficult for her to lift her arms above her head that lasted for about 2 months. About 2 months ago she noticed episode of L eye vision loss which happened a total of 5 times lasting from 5 minutes to 1.5 hours. Majority were around 1 hour long. The vision change will start with a sensation of scratching in her L eye which will be followed by whitening of her vision . It happened about 5 time same presentation and in the same eye. She is confident that it was the L eye that was a problem rather than visual field issue. This last for about 2 months. Currently her headache and vision changes are resolved. She reports that she was not taking her BP medication during this period of time as she ran out of her insurance and her BP were high. The highest it has been was 230. She was seen by PCP in June and reports that at that time she was started on anti hypertensive medications.     She denies any other hx of TIA or stroke like symptoms. For her work up MRI was ordered which showed R semi ovale stroke ?watershed. MRA head showed R MCA and L PCA stenosis. US carotid did not show high grade carotid stenosis. She had LDL of 88.     She was seen in clinic for headache and abnormal MRI showing acute infarct with intracranial stenosis. Follow up CTA showed similar findings. No extracranial carotid disease. She reports her headache and vision changes has resolved since then. She started noticing L scalp tenderness starting 3 days ago. She denies any vision changes associated with it. She reports muscle ache but no night sweates or fevers. She reports compliance with her medications without any side effects.     Impression:   Problem List Items  "Addressed This Visit     None      Visit Diagnoses     PAD (peripheral artery disease) (H)    -  Primary    Middle cerebral artery stenosis, right        Acute ischemic right MCA stroke (H)        Scalp tenderness        Relevant Orders    Erythrocyte sedimentation rate auto    CRP inflammation        60 year old female with past medical hx of COPD smoking HTN presents with headache and L eye vision changes. Her symptoms of headache and vision changes are interesting and she mentions some bilateral show weakness ( ? Polymyalgia rheumatica), her esr and crp were negative so the likelihood for it being GCA is low , plus she had an eye examination which was negative for papilledema or other findings suggestive of AION or PION. Given recurrent L eye changes , CTA was obtain which was neg for extracranial disease however showed similar intracranial stenosis.       Plan:     Take full doses 325 mg aspirin daily    Crestor at 10 mg daily    Echo test is unremarkable    Lab work for LDL and HbA1c to be completed today    30 day heart monitor is neg for Afib    Smoking cessation    LDL goal is <70    Follow up in 6 months    ESR CRP for scalp tenderness    If negative will refer to ophthalmology for evaluation and need for temporal artery biopsy      Stroke Education provided.  She will call us with any questions.  For any acute neurologic deficits she was advised to  go directly to the hospital rather than call the clinic.    Calvin Murphy MD  Neurology  01/20/2022 11:03 AM  To page me or covering stroke neurology team member, click here: AMCOM  Choose \"On Call\" tab at top, then search dropdown box for \"Neurology Adult\" & press Enter, look for Neuro ICU/Stroke    ___________________________________________________________________    Current Medications  Current Outpatient Medications   Medication Sig     albuterol (PROAIR HFA/PROVENTIL HFA/VENTOLIN HFA) 108 (90 Base) MCG/ACT inhaler Inhale 2 puffs into the lungs every 6 " hours as needed for shortness of breath / dyspnea or wheezing     albuterol (PROVENTIL) (2.5 MG/3ML) 0.083% neb solution Take 1 vial (2.5 mg) by nebulization 4 times daily     diazepam (VALIUM) 10 MG tablet TAKE 1/2 TO 1 TABLET BY MOUTH DAILY AS NEEDED FOR ANXIETY     DULoxetine (CYMBALTA) 30 MG capsule Take 1 capsule (30 mg) by mouth 2 times daily     estradiol (VAGIFEM) 10 MCG TABS vaginal tablet Place 1 tablet (10 mcg) vaginally twice a week     losartan-hydrochlorothiazide (HYZAAR) 100-25 MG tablet TAKE 1 TABLET BY MOUTH DAILY     metoprolol succinate ER (TOPROL-XL) 50 MG 24 hr tablet Take 1 tablet (50 mg) by mouth daily     mometasone-formoterol (DULERA) 200-5 MCG/ACT inhaler Inhale 2 puffs into the lungs 2 times daily     nicotine (NICODERM CQ) 21 MG/24HR 24 hr patch Place 1 patch onto the skin every 24 hours     rosuvastatin (CRESTOR) 10 MG tablet Take 2 tablets (20 mg) by mouth daily     tiotropium (SPIRIVA) 18 MCG inhaled capsule Inhale 1 capsule (18 mcg) into the lungs daily     triamcinolone (KENALOG) 0.1 % external cream APPLY TOPICALLY TO THE AFFECTED AREA TWICE DAILY     clopidogrel (PLAVIX) 75 MG tablet Take 1 tablet (75 mg) by mouth daily     No current facility-administered medications for this visit.       Past Medical History  Past Medical History:   Diagnosis Date     ACL (anterior cruciate ligament) tear 2012    Left      Chronic obstructive pulmonary disease with acute exacerbation (H) 2014    First hospitalization 14      CKD (chronic kidney disease) stage 3, GFR 30-59 ml/min 2020     CTS (carpal tunnel syndrome) 2010     Degenerative joint disease      Temporomandibular joint disorders, unspecified        Social History  Social History     Tobacco Use     Smoking status: Former Smoker     Packs/day: 1.00     Years: 32.00     Pack years: 32.00     Types: Cigarettes, Other     Quit date: 2012     Years since quittin.0     Smokeless tobacco: Never Used      Tobacco comment: using nicotine replacement: e cigarattes   Substance Use Topics     Alcohol use: No     Alcohol/week: 0.0 standard drinks     Drug use: No       Family History  Family History   Problem Relation Age of Onset     Breast Cancer Maternal Grandmother      Coronary Artery Disease Maternal Grandmother         MI at 41     Cerebrovascular Disease Mother      Hypertension Mother      Heart Disease Mother      Cancer Mother         Lung     Substance Abuse Mother      Cerebrovascular Disease Father      Hypertension Father      Cancer Father         Metastatic, primary lung/Prostate     Substance Abuse Father      Cancer Other      Substance Abuse Brother      Depression Son      Schizophrenia Brother      Bipolar Disorder Brother      Substance Abuse Brother      Aneurysm Paternal Uncle         Brain Aneurysms       ROS: 10 point relevant ROS neg other than the symptoms noted above in the HPI.    Physical Exam    BP (!) 173/108   SpO2 97%     Neurologic  Mental Status:  alert, oriented x 3, follows commands, speech clear and fluent, naming and repetition normal  Cranial Nerves:  EOMI with normal smooth pursuit, facial movements symmetric, hearing not formally tested but intact to conversation, no dysarthria, shoulder shrug equal bilaterally, tongue protrusion midline  Motor:  no abnormal movements, able to move all limbs antigravity spontaneously with no signs of hemiparesis observed, no pronator drift  Reflexes:  Deferred due to telestroke  Sensory:  Deferred  Coordination: Deferred due to telestroke  Station/Gait:  Deferred    Neuroimaging: as per HPI. I personally reviewed those images and it shows R semioval stroke likely 14days old. MRA with RMCA and LPCA stenosis.    Labs:    No lab results found.     Recent Labs   Lab Test 06/15/21  0824 08/12/16  0851   CHOL 191 145   HDL 55 50   LDL 88 55   TRIG 240* 202*       No lab results found.    No lab results found.

## 2022-01-21 ENCOUNTER — MYC MEDICAL ADVICE (OUTPATIENT)
Dept: INTERNAL MEDICINE | Facility: CLINIC | Age: 62
End: 2022-01-21

## 2022-01-21 ENCOUNTER — LAB (OUTPATIENT)
Dept: LAB | Facility: CLINIC | Age: 62
End: 2022-01-21
Payer: COMMERCIAL

## 2022-01-21 DIAGNOSIS — R51.9 SCALP TENDERNESS: ICD-10-CM

## 2022-01-21 DIAGNOSIS — I63.511 ACUTE ISCHEMIC RIGHT MCA STROKE (H): ICD-10-CM

## 2022-01-21 DIAGNOSIS — N89.8 VAGINAL ITCHING: Primary | ICD-10-CM

## 2022-01-21 LAB
CHOLEST SERPL-MCNC: 178 MG/DL
CRP SERPL-MCNC: <2.9 MG/L (ref 0–8)
ERYTHROCYTE [SEDIMENTATION RATE] IN BLOOD BY WESTERGREN METHOD: 10 MM/HR (ref 0–30)
FASTING STATUS PATIENT QL REPORTED: YES
HBA1C MFR BLD: 5.8 % (ref 0–5.6)
HDLC SERPL-MCNC: 49 MG/DL
LDLC SERPL CALC-MCNC: 95 MG/DL
NONHDLC SERPL-MCNC: 129 MG/DL
TRIGL SERPL-MCNC: 170 MG/DL

## 2022-01-21 PROCEDURE — 85652 RBC SED RATE AUTOMATED: CPT

## 2022-01-21 PROCEDURE — 83036 HEMOGLOBIN GLYCOSYLATED A1C: CPT

## 2022-01-21 PROCEDURE — 36415 COLL VENOUS BLD VENIPUNCTURE: CPT

## 2022-01-21 PROCEDURE — 80061 LIPID PANEL: CPT

## 2022-01-21 PROCEDURE — 86140 C-REACTIVE PROTEIN: CPT

## 2022-01-21 NOTE — TELEPHONE ENCOUNTER
Pt has attempted multiple e-visits for condition. No available appointments with PCP until end of next week. Will route for recommendations.    Silvia WAHL RN, BSN  [facility]

## 2022-02-14 ENCOUNTER — OFFICE VISIT (OUTPATIENT)
Dept: OBGYN | Facility: CLINIC | Age: 62
End: 2022-02-14
Payer: COMMERCIAL

## 2022-02-14 VITALS
HEART RATE: 114 BPM | WEIGHT: 134.6 LBS | DIASTOLIC BLOOD PRESSURE: 82 MMHG | BODY MASS INDEX: 21.73 KG/M2 | OXYGEN SATURATION: 99 % | SYSTOLIC BLOOD PRESSURE: 119 MMHG

## 2022-02-14 DIAGNOSIS — N76.0 BACTERIAL VAGINOSIS: ICD-10-CM

## 2022-02-14 DIAGNOSIS — Z12.11 SPECIAL SCREENING FOR MALIGNANT NEOPLASMS, COLON: Primary | ICD-10-CM

## 2022-02-14 DIAGNOSIS — N89.8 VAGINAL ITCHING: ICD-10-CM

## 2022-02-14 DIAGNOSIS — N76.2 ACUTE VULVITIS: ICD-10-CM

## 2022-02-14 DIAGNOSIS — B96.89 BACTERIAL VAGINOSIS: ICD-10-CM

## 2022-02-14 LAB
CLUE CELLS: PRESENT
TRICHOMONAS, WET PREP: ABNORMAL
WBC'S/HIGH POWER FIELD, WET PREP: ABNORMAL
YEAST, WET PREP: ABNORMAL

## 2022-02-14 PROCEDURE — 99203 OFFICE O/P NEW LOW 30 MIN: CPT | Performed by: OBSTETRICS & GYNECOLOGY

## 2022-02-14 PROCEDURE — 87210 SMEAR WET MOUNT SALINE/INK: CPT | Performed by: OBSTETRICS & GYNECOLOGY

## 2022-02-14 RX ORDER — CLOBETASOL PROPIONATE 0.5 MG/G
OINTMENT TOPICAL 2 TIMES DAILY
Qty: 15 G | Refills: 0 | Status: SHIPPED | OUTPATIENT
Start: 2022-02-14 | End: 2023-11-07

## 2022-02-14 RX ORDER — METRONIDAZOLE 500 MG/1
500 TABLET ORAL 2 TIMES DAILY
Qty: 10 TABLET | Refills: 0 | Status: SHIPPED | OUTPATIENT
Start: 2022-02-14 | End: 2022-03-31

## 2022-02-14 ASSESSMENT — PATIENT HEALTH QUESTIONNAIRE - PHQ9
SUM OF ALL RESPONSES TO PHQ QUESTIONS 1-9: 11
10. IF YOU CHECKED OFF ANY PROBLEMS, HOW DIFFICULT HAVE THESE PROBLEMS MADE IT FOR YOU TO DO YOUR WORK, TAKE CARE OF THINGS AT HOME, OR GET ALONG WITH OTHER PEOPLE: SOMEWHAT DIFFICULT
SUM OF ALL RESPONSES TO PHQ QUESTIONS 1-9: 11

## 2022-02-14 NOTE — PATIENT INSTRUCTIONS
I'm sorry for the delay.  I did find a bacterial vaginal infection.  I've sent a prescription for an antibiotic that should clear it up.  There is also quite a bit of vulvar inflammation.  I have also prescribed a topical steroid that should help with the intense itching.  Use a small amount (only on the outside) twice daily.  I would like to see you back in 2-3 weeks to make sure it is improving.  Kyree Larios MD FACOG

## 2022-02-14 NOTE — PROGRESS NOTES
Georgette is a 61 year old   is here today complaining of vulvar itching.  This has been a problem for several months.  There is also a discharge..       ROS: Pertinent ROS as above.    Gyn Hx:      Past Medical History:   Diagnosis Date     ACL (anterior cruciate ligament) tear 2012    Left      Chronic obstructive pulmonary disease with acute exacerbation (H) 2014    First hospitalization 14      CKD (chronic kidney disease) stage 3, GFR 30-59 ml/min (H) 2020     CTS (carpal tunnel syndrome) 2010     Degenerative joint disease      Temporomandibular joint disorders, unspecified      Past Surgical History:   Procedure Laterality Date     HYSTERECTOMY VAGINAL       ZZC TMJ ARTHROSCOPY/SURGERY       Patient Active Problem List   Diagnosis     Pulmonary nodules     Anxiety     Fibromyalgia     Knee pain     History of pneumonia, recurrent     Internal hemorrhoids with other complication     COPD, moderate (H)     Adult ADHD     Family history of early CAD     Hyperlipidemia LDL goal <130     Essential hypertension with goal blood pressure less than 140/90     Primary osteoarthritis involving multiple joints     Advanced directives, counseling/discussion     CKD (chronic kidney disease) stage 3, GFR 30-59 ml/min (H)     Chronic kidney disease, stage 3a (H)       ALL/Meds: Her medication and allergy histories were reviewed and are documented in their appropriate chart areas.    SH: Reviewed and documented in the appropriate area of the chart.  FH:  Her family history is reviewed and updated in the chart, today.  PMH: Her past medical, surgical, and obstetric histories were reviewed and updated today in the appropriate chart areas.    PE: /82 (BP Location: Left arm, Patient Position: Sitting, Cuff Size: Adult Regular)   Pulse 114   Wt 61.1 kg (134 lb 9.6 oz)   SpO2 99%   BMI 21.73 kg/m    Body mass index is 21.73 kg/m .    Pertinent Physical exam findings:    General  Appearance:  healthy, alert, active, no distress  Pelvic:       - Ext: Vulva and perineum are inflammed, erythematious, but with no discreet  lesions  Wet prep: + for BV          A/P:(Z12.11) Special screening for malignant neoplasms, colon  (primary encounter diagnosis)    Plan: Adult Gastro Ref - Procedure Only      (N89.8) Vaginal itching    Plan: Wet prep - Clinic Collect      (N76.0,  B96.89) Bacterial vaginosis    Plan: metroNIDAZOLE (FLAGYL) 500 MG tablet           (N76.2) Acute vulvitis  Comment:   Plan: clobetasol (TEMOVATE) 0.05 % external ointment                 -    -   Orders Placed This Encounter   Procedures     Adult Gastro Ref - Procedure Only       Answers for HPI/ROS submitted by the patient on 2/14/2022  If you checked off any problems, how difficult have these problems made it for you to do your work, take care of things at home, or get along with other people?: Somewhat difficult  PHQ9 TOTAL SCORE: 11

## 2022-02-15 ASSESSMENT — PATIENT HEALTH QUESTIONNAIRE - PHQ9: SUM OF ALL RESPONSES TO PHQ QUESTIONS 1-9: 11

## 2022-02-16 ENCOUNTER — TELEPHONE (OUTPATIENT)
Dept: GASTROENTEROLOGY | Facility: CLINIC | Age: 62
End: 2022-02-16
Payer: COMMERCIAL

## 2022-02-16 NOTE — TELEPHONE ENCOUNTER
Screening Questions  Blue=prep questions Red=location Green=sedation   1. Are you active on mychart? Y    2. What insurance is in the chart? [employer]     3.  Ordering/Referring Provider: Kyree Larios MD in [facility]    4. BMI 21.6, If greater than 40 review exclusion criteria also will need EXTENDED PREP    5.  Respiratory Screening (If yes to any of the following HOSPITAL setting only):     Do you use daily home oxygen? N  Do you have mod to severe Obstructive Sleep Apnea? N (can be seen at [facility] or hospital setting)    Do you have Pulmonary Hypertension? N   Do you have UNCONTROLLED asthma? N    6. Have you had a heart or lung transplant? N  (If yes, please review exclusion criteria)    7. Are you currently on dialysis?N  (If yes, schedule in HOSPITAL setting only)(If yes, please send Golytely prep)    8. Do you have chronic kidney disease? Y (If yes, please send Golytely prep)    9. Have you had a stroke or Transient ischemic attack (TIA) within 6 months? Y, JULY 2021 (If yes, do not schedule at [facility])    10. In the past 6 months, have you had any heart related issues including cardiomyopathy or heart attack? N (If yes, please review exclusion criteria)           If yes, did it require cardiac stenting or other implantable device?N  (If yes, please review exclusion criteria)      11. Do you have any implantable devices in your body (pacemaker, defib, LVAD)? N (If yes, schedule at [facility])    12. Do you take nitroglycerin? If yes, how often? N (if yes, schedule at HOSPITAL setting)    13. Are you currently taking any blood thinners?N (If yes- inform patient to follow up with PCP or provider for follow up instructions)     14. Are you a diabetic? N (If yes, please send Golytely prep)    15. (Females) Are you currently pregnant? N  If yes, how many weeks?      16. Are you taking any prescription pain medications on a routine schedule? N If yes, MAC sedation and patient will need EXTENDED PREP.    17. Do you have any  chemical dependencies such as alcohol, street drugs, or methadone? N If yes, MAC sedation     18. Do you have any history of post-traumatic stress syndrome, severe anxiety or history of psychosis? Y, SEVERE ANXIETY, VALIUM WHEN NEEDED  If yes, MAC sedation.     19. Do you transfer independently? Y    20.  Do you have any issues with constipation? Y   If yes, pt will need EXTENDED PREP     21. Preferred Pharmacy for Pre Prescription [id number] [address] [address]    Scheduling Details    Which Colonoscopy Prep was Sent?: extended  Type of Procedure Scheduled: Colonoscopy  Surgeon:   Date of Procedure:   Location:   Caller (Please ask for phone number if not scheduled by patient): [facility]      Sedation Type: MAC  Conscious Sedation- Needs  for 6 hours after the procedure  MAC/General-Needs  for 24 hours after procedure    Pre-op Required at [facility], [place], [place] and OR for MAC sedation: Y, will schedule w/ PCP  (if yes advise patient they will need a pre-op prior to procedure)      Informed patient they will need an adult  Y  Cannot take any type of public or medical transportation alone    Pre-Procedure Covid test to be completed at [facility] or Externally:     Confirmed Nurse will call to complete assessment     Additional comments: Will return call to pt to continue scheduling.

## 2022-02-24 ENCOUNTER — TELEPHONE (OUTPATIENT)
Dept: GASTROENTEROLOGY | Facility: CLINIC | Age: 62
End: 2022-02-24

## 2022-02-24 ENCOUNTER — HOSPITAL ENCOUNTER (OUTPATIENT)
Facility: CLINIC | Age: 62
End: 2022-02-24
Attending: SPECIALIST | Admitting: SPECIALIST
Payer: COMMERCIAL

## 2022-02-24 ENCOUNTER — OFFICE VISIT (OUTPATIENT)
Dept: FAMILY MEDICINE | Facility: CLINIC | Age: 62
End: 2022-02-24
Payer: COMMERCIAL

## 2022-02-24 VITALS
BODY MASS INDEX: 21.79 KG/M2 | HEART RATE: 89 BPM | OXYGEN SATURATION: 98 % | TEMPERATURE: 98.2 F | DIASTOLIC BLOOD PRESSURE: 86 MMHG | WEIGHT: 135 LBS | SYSTOLIC BLOOD PRESSURE: 136 MMHG

## 2022-02-24 DIAGNOSIS — F17.200 NICOTINE DEPENDENCE, UNCOMPLICATED, UNSPECIFIED NICOTINE PRODUCT TYPE: ICD-10-CM

## 2022-02-24 DIAGNOSIS — R32 URINARY INCONTINENCE, UNSPECIFIED TYPE: ICD-10-CM

## 2022-02-24 DIAGNOSIS — M54.50 MIDLINE LOW BACK PAIN WITHOUT SCIATICA, UNSPECIFIED CHRONICITY: ICD-10-CM

## 2022-02-24 DIAGNOSIS — F33.1 MODERATE RECURRENT MAJOR DEPRESSION (H): ICD-10-CM

## 2022-02-24 DIAGNOSIS — Z12.11 COLON CANCER SCREENING: Primary | ICD-10-CM

## 2022-02-24 DIAGNOSIS — T17.308A CHOKING, INITIAL ENCOUNTER: ICD-10-CM

## 2022-02-24 DIAGNOSIS — Z11.59 ENCOUNTER FOR SCREENING FOR OTHER VIRAL DISEASES: Primary | ICD-10-CM

## 2022-02-24 DIAGNOSIS — G44.52 NEW DAILY PERSISTENT HEADACHE: Primary | ICD-10-CM

## 2022-02-24 DIAGNOSIS — J44.9 COPD, MODERATE (H): ICD-10-CM

## 2022-02-24 DIAGNOSIS — J01.00 ACUTE NON-RECURRENT MAXILLARY SINUSITIS: ICD-10-CM

## 2022-02-24 LAB
ALBUMIN UR-MCNC: ABNORMAL MG/DL
APPEARANCE UR: ABNORMAL
BILIRUB UR QL STRIP: NEGATIVE
COLOR UR AUTO: YELLOW
GLUCOSE UR STRIP-MCNC: NEGATIVE MG/DL
HGB UR QL STRIP: ABNORMAL
KETONES UR STRIP-MCNC: NEGATIVE MG/DL
LEUKOCYTE ESTERASE UR QL STRIP: ABNORMAL
NITRATE UR QL: NEGATIVE
PH UR STRIP: 5.5 [PH] (ref 5–7)
RBC #/AREA URNS AUTO: ABNORMAL /HPF
SP GR UR STRIP: >=1.03 (ref 1–1.03)
SQUAMOUS #/AREA URNS AUTO: ABNORMAL /LPF
UROBILINOGEN UR STRIP-ACNC: 0.2 E.U./DL
WBC #/AREA URNS AUTO: ABNORMAL /HPF

## 2022-02-24 PROCEDURE — 81001 URINALYSIS AUTO W/SCOPE: CPT | Performed by: NURSE PRACTITIONER

## 2022-02-24 PROCEDURE — 87086 URINE CULTURE/COLONY COUNT: CPT | Performed by: NURSE PRACTITIONER

## 2022-02-24 PROCEDURE — 99215 OFFICE O/P EST HI 40 MIN: CPT | Performed by: NURSE PRACTITIONER

## 2022-02-24 PROCEDURE — 96127 BRIEF EMOTIONAL/BEHAV ASSMT: CPT | Performed by: NURSE PRACTITIONER

## 2022-02-24 RX ORDER — ASPIRIN 325 MG
325 TABLET ORAL DAILY
Status: CANCELLED | OUTPATIENT
Start: 2022-02-24

## 2022-02-24 RX ORDER — NICOTINE 21 MG/24HR
1 PATCH, TRANSDERMAL 24 HOURS TRANSDERMAL EVERY 24 HOURS
Qty: 28 PATCH | Refills: 0 | Status: SHIPPED | OUTPATIENT
Start: 2022-02-24 | End: 2023-10-19

## 2022-02-24 ASSESSMENT — ANXIETY QUESTIONNAIRES
4. TROUBLE RELAXING: NEARLY EVERY DAY
GAD7 TOTAL SCORE: 16
6. BECOMING EASILY ANNOYED OR IRRITABLE: MORE THAN HALF THE DAYS
5. BEING SO RESTLESS THAT IT IS HARD TO SIT STILL: NEARLY EVERY DAY
3. WORRYING TOO MUCH ABOUT DIFFERENT THINGS: SEVERAL DAYS
1. FEELING NERVOUS, ANXIOUS, OR ON EDGE: NEARLY EVERY DAY
7. FEELING AFRAID AS IF SOMETHING AWFUL MIGHT HAPPEN: MORE THAN HALF THE DAYS
2. NOT BEING ABLE TO STOP OR CONTROL WORRYING: MORE THAN HALF THE DAYS
GAD7 TOTAL SCORE: 16
GAD7 TOTAL SCORE: 16
7. FEELING AFRAID AS IF SOMETHING AWFUL MIGHT HAPPEN: MORE THAN HALF THE DAYS

## 2022-02-24 ASSESSMENT — PATIENT HEALTH QUESTIONNAIRE - PHQ9
10. IF YOU CHECKED OFF ANY PROBLEMS, HOW DIFFICULT HAVE THESE PROBLEMS MADE IT FOR YOU TO DO YOUR WORK, TAKE CARE OF THINGS AT HOME, OR GET ALONG WITH OTHER PEOPLE: EXTREMELY DIFFICULT
SUM OF ALL RESPONSES TO PHQ QUESTIONS 1-9: 18
SUM OF ALL RESPONSES TO PHQ QUESTIONS 1-9: 18

## 2022-02-24 ASSESSMENT — ENCOUNTER SYMPTOMS
COUGH: 1
HEADACHES: 1
CHOKING: 1
SHORTNESS OF BREATH: 1
BACK PAIN: 1
WHEEZING: 1

## 2022-02-24 NOTE — PATIENT INSTRUCTIONS
"Google \"FPA Inhalers\" to see the inhaler pictures.    Your Blue inhaler is Dulera. Take 2 puffs twice daily every day no matter what, even when your breathing is good.  Spiriva is the gray round one- you also need to take this every day no matter what.    The red one you only take when you are wheezing. You also can take this 15 minutes before triggers (I.e. walking to the school bus in the morning).    Nicotine Patch    Dosing:    >10 cigarettes per day Dose   Weeks 1-6 Use one 21 mg patch per day.   Weeks 7-8 Use one 14 mg patch per day.   Weeks 9-10 Use one 7 mg patch per day   <10 cigarettes per day  Weeks 1-6 Use one 14 mg patch per day   Weeks 7-8 Use one 7 mg patch per day       How to use the Nicotine Patch:    Apply a new patch to non-hairy, clean, dry skin on the upper body or upper outer arm.  Remove backing from patch and press on skin.  Hold for 10 seconds.    Apply patch around the same time every day.    Wash your hands after applying the patch.    Remove the patch at the end of the day before you go to bed.    Apply a new patch the next morning.    Do not apply the patch to an area where you have worn a patch in the last week.   This will help prevent or reduce skin irritation.    Some Tips:    Do not smoke while you are using the nicotine patch!    Do not cut the nicotine patch -it will be ineffective.    Remove the patch prior to receiving an MRI since the patch may contain some metal.    Do not put the patch on irritated, cut, or burned skin.    If the patch falls off and cannot be reapplied, put on a new patch.    Follow -up with your health care professional if you have any questions and also to help taper your nicotine patch dose.    Side Effects:  Some people experience some skin irritation where the patch was placed.  Moving around the site where you are putting the patch each day should help.  If you experience any other troublesome or unusual side effects, call your health care " professional.

## 2022-02-24 NOTE — TELEPHONE ENCOUNTER
FUTURE VISIT INFORMATION      SURGERY INFORMATION:    Date: 3/16/22    Location: [facility] or    Surgeon:  Ricardo Whaley MD    Anesthesia Type:  Conscious Sedation    Procedure: COLONOSCOPY, WITH CO2 INSUFFLATION    RECORDS REQUESTED FROM:        Primary Care Provider: Yue Bermudez MD- [facility]    Pertinent Medical History: COPD, pulmonary nodules, hypertension    Most recent EKG+ Tracin20- [facility]    Most recent ECHO: 21    Most recent Cardiac Stress Test: 2/13/15    Most recent PFT's: 21    Most recent Sleep Study:  3/23/16

## 2022-02-24 NOTE — TELEPHONE ENCOUNTER
[SCREENING QUESTIONS COMPLETED 02/16/2022]     VM LEFT FOR PT TO CALL BACK AND R/S PROCEDURE FOR A (MAC) DAY AT [facility] OR.      PT WILL NEED TO RESCHEDULE:    PROC: to a DAY with MAC @ [facility] OR   COVID test  PRE-OP: has to be IN person     [facility]         Scheduling Details      Caller : Georgette Pereira    (Please ask for phone number if not scheduled by patient)    Type of Procedure Scheduled: COLONOSCOPY   Which Colonoscopy Prep was Sent?:   GOLYTELY/ CHRONIC KIDNEY DISEASE   Date of Procedure: 03/16/2022  Location: [facility]       Sedation Type: =====> NEEDS (MAC)   Conscious Sedation- Needs  for 6 hours after the procedure  MAC/General-Needs  for 24 hours after procedure    Pre-op Required at [facility], [place], [place] and OR for MAC sedation: Y  (advise patient they will need a pre-op prior to procedure -)      Informed patient they will need an adult  Y  Cannot take any type of public or medical transportation alone    Pre-Procedure Covid test to be completed at [facility] or Externally: Y    Confirmed Nurse will call to complete assessment Y    Additional comments:     VM LEFT FOR PT TO CALL BACK AND R/S PROCEDURE FOR A (MAC) DAY AT [facility] OR.  [facility]         ______________________________________________      02/25/2022: Pt called back     Caller: Georgette Pereira      Procedure: Colonoscopy    Date, Location, and Surgeon of Procedure Cancelled: 03/11/2022, [facility], [place]     Ordering Provider: Jose Larios     Reason for cancel (please be detailed, any staff messages or encounters to note?):     Pt needed (MAC)     Rescheduled: yes      If rescheduled:    Date: 03/11/2022   Location:  GI    Note any change or update to original order/sedation:     Correct change, CS to MAC

## 2022-02-24 NOTE — PROGRESS NOTES
Assessment & Plan     (G44.52) New daily persistent headache  (primary encounter diagnosis)  Comment: Hx of stroke in July at which time she presented with similar headaches, which did resolve. Within the last couple of weeks, they have become more persistent. States they are not as severe as when she had her stroke. No neurological defects. Per pt, takes 325mg of Aspirin daily. Prudent to rule out new stroke given similar presentation.  Plan: MR Brain w/o Contrast   Urged patient to get in the next 1-2 days    (T17.308A) Choking, initial encounter  Comment: Choking and coughing while eating food. Possibly silent GERD with esophagitis.  Plan: omeprazole (PRILOSEC) 20 MG DR capsule        If no improvement, plan for possible EGD for evaluation.     (J44.9) COPD, moderate (H)  Comment: Worsening. Takes the wrong inhalers at incorrect time. Previous smoker but has been vaping for the last 13 years and is interested in quitting.   Plan: Nebulizer and Supplies Order for DME - ONLY FOR        DME, nicotine (NICODERM CQ) 21 MG/24HR 24 hr         patch        Pt unaware of how to properly take inhalers. Education was provided on what inhalers to take and when. Patient showed understanding and motivated to take inhalers properly to improve COPD symptoms. Plan to follow up with PCP in one month.     (R32) Urinary incontinence, unspecified type  Comment: New urinary incontinence and odor within the last two weeks. Suspect UTI.   Plan: UA Macro with Reflex to Micro and Culture - lab        collect, Urine Microscopic, CANCELED: UA Macro         with Reflex to Micro and Culture - lab collect    (M54.50) Midline low back pain without sciatica, unspecified chronicity  Comment: Chronic. Does have new urinary incontinence but suspect UTI, no radicular symptoms or groin anesthesia to raise concern for cauda equina. Offered Physical Therapy referral.  Plan: Follow up with PCP in one month    (F33.1) Moderate recurrent major  depression (H)  Comment: Worsening within the last two years due to family struggles. Declines any thoughts of harming self or others.   Plan: Adult Mental Health  Referral   Follow up with PCP in one month    (F17.200) Nicotine dependence, uncomplicated, unspecified nicotine product type  Comment: currently vapes and is interested in using Nicotine Patches. Has worked for patient in the past.     Review of prior external note(s) from - Outside records from Neurology  Review of the result(s) of each unique test - Brain MRI  Ordering of each unique test  Prescription drug management  45 minutes spent on the date of the encounter doing chart review, history and exam, documentation and further activities per the note       Depression Screening Follow Up    PHQ 2/24/2022   PHQ-9 Total Score 18   Q9: Thoughts of better off dead/self-harm past 2 weeks Not at all         Follow Up Actions Taken  Mental Health Referral placed  Referred patient back to PCP     See Patient Instructions    Return in about 1 month (around 3/24/2022) for Recheck with PCP.    Ciera Lewis RN, FNP-student    KATHRYN Rodríguez [facility] YVETTE Holloway is a 61 year old who presents for the following health issues  accompanied by her self.       Pt expresses persistent headache for 3 weeks.Headaches worsened with COVID-19 but states they have since improved. 2 asprins helps for 20 minutes but then headaches return. She describes the pain in the front of her head and that occasionally moves to the back of her head and down her neck and rarely has numbness and tingling that goes down her arms. Hx of stroke in July and was on plavix for only 90 days. She currently take 325 mg of Aspirin per neurology. Yesterday she got lost on her usual bus route which she states has happened a few times previously within the last year. She declines any neurological deficits or weakness in arms/legs.    Pt has Hx  of COPD and states that her breathing has worsened. Pt tested positive for COVID-19 5 weeks ago at which time she had worsening symptoms of shortness of breath. Patient does state that COPD symptoms have worsened in the last year. She describes having difficulty walking up stairs and reports wheezing. She also states symptoms get worse when its cold. Albuterol helps with symptoms and she is also taking dulera and spiriva currently. When describing how she take her medication, it was realized that she is taking them incorrectly and that she takes her dulera as a rescue inahler and only does her Spiriva once a day. Her current neb machine is also broken. Patient also has vaped for the last 13 years and has expressed vaping more within the last couple of months. She expressed interest in nicotine patches which have worked in the past.     Patient expresses feel like she is choking on spit and coughing throughout the day. Choking on liquids also. She declines any history of GERD, burning sensation in her throat or increased coughing at night or laying down but does state that she coughs while she eats. She has had these symptoms for many months.     Patient complains of chronic lower back pain. She declines any numbness or tingling that extends down her legs. She declines any improvement with medication and past mild improvement with message. Patient declines any pelvic numbness and changes in her bowels. She also declines any new weakness in her extremities.     2 week history of urinary incontinence and odor with mild abdominal and back pain. She declines any urinary frequency or dysuria. She was diagnosed with Bacterial Vaginosis 10 days ago and was prescribed Flagyl.     Depression has worsened within the last two years which was associated with the death of her mother. She declines thought of harming herself or others. She has tried counseling before with improvement and finds improvement with journaling. She has a  history of depression and states she has previously been on medication but does not remember when or what it was. Denies suicidal or homicidal ideation.    Review of Systems   Respiratory: Positive for cough, choking, shortness of breath and wheezing.    Musculoskeletal: Positive for back pain.   Neurological: Positive for headaches.   Psychiatric/Behavioral: Positive for mood changes.          Objective    /86 (BP Location: Left arm, Patient Position: Sitting, Cuff Size: Adult Regular)   Pulse 89   Temp 98.2  F (36.8  C) (Oral)   Wt 61.2 kg (135 lb)   SpO2 98%   BMI 21.79 kg/m    Body mass index is 21.79 kg/m .  Physical Exam   GENERAL: healthy, alert and no distress  EYES: Eyes grossly normal to inspection, PERRL and conjunctivae and sclerae normal  HENT: ear canals and TM's normal, nose and mouth without ulcers or lesions  NECK: no adenopathy, no asymmetry, masses, or scars   RESP: lungs clear to auscultation - no rales, rhonchi or wheezes  CV: regular rate and rhythm, normal S1 S2, no S3 or S4, no murmur, click or rub, no peripheral edema  ABDOMEN: soft, nontender, no hepatosplenomegaly, no masses and bowel sounds normal  MS: no gross musculoskeletal defects noted, no edema, tender in mid lower back, negative straight leg test bilaterally.  SKIN: no suspicious lesions or rashes  NEURO: Normal strength and tone, mentation intact and speech normal, CN II-XII intact, appropriate gate, finger-to-nose and heel-to-shin without concern.  PSYCH: mentation appears normal, sad    Results for orders placed or performed in visit on 02/24/22   UA Macro with Reflex to Micro and Culture - lab collect     Status: Abnormal    Specimen: Urine, Midstream   Result Value Ref Range    Color Urine Yellow Colorless, Straw, Light Yellow, Yellow    Appearance Urine Slightly Cloudy (A) Clear    Glucose Urine Negative Negative mg/dL    Bilirubin Urine Negative Negative    Ketones Urine Negative Negative mg/dL    Specific Gravity  Urine >=1.030 1.003 - 1.035    Blood Urine Small (A) Negative    pH Urine 5.5 5.0 - 7.0    Protein Albumin Urine Trace (A) Negative mg/dL    Urobilinogen Urine 0.2 0.2, 1.0 E.U./dL    Nitrite Urine Negative Negative    Leukocyte Esterase Urine Trace (A) Negative   Urine Microscopic     Status: Abnormal   Result Value Ref Range    RBC Urine 5-10 (A) 0-2 /HPF /HPF    WBC Urine 5-10 (A) 0-5 /HPF /HPF    Squamous Epithelials Urine Few (A) None Seen /LPF    Narrative    Urine Culture not indicated       Resident/Fellow Attestation   I, Angeles Murray, was present with the medical/ROLANDA student who participated in the service and in the documentation of the note.  I have verified the history and personally performed the physical exam and medical decision making.  I agree with the assessment and plan of care as documented in the note.      Angeles Murray, APRN CNP          Answers for HPI/ROS submitted by the patient on 2/24/2022  If you checked off any problems, how difficult have these problems made it for you to do your work, take care of things at home, or get along with other people?: Extremely difficult  PHQ9 TOTAL SCORE: 18  AGUSTIN 7 TOTAL SCORE: 16

## 2022-02-25 ENCOUNTER — MYC MEDICAL ADVICE (OUTPATIENT)
Dept: FAMILY MEDICINE | Facility: CLINIC | Age: 62
End: 2022-02-25
Payer: COMMERCIAL

## 2022-02-25 DIAGNOSIS — N76.0 BV (BACTERIAL VAGINOSIS): Primary | ICD-10-CM

## 2022-02-25 DIAGNOSIS — B96.89 BV (BACTERIAL VAGINOSIS): Primary | ICD-10-CM

## 2022-02-25 DIAGNOSIS — Z11.59 ENCOUNTER FOR SCREENING FOR OTHER VIRAL DISEASES: Primary | ICD-10-CM

## 2022-02-25 RX ORDER — METRONIDAZOLE 7.5 MG/G
1 GEL VAGINAL DAILY
Qty: 25 G | Refills: 0 | Status: SHIPPED | OUTPATIENT
Start: 2022-02-25 | End: 2022-03-02

## 2022-02-25 ASSESSMENT — ANXIETY QUESTIONNAIRES: GAD7 TOTAL SCORE: 16

## 2022-02-25 ASSESSMENT — PATIENT HEALTH QUESTIONNAIRE - PHQ9: SUM OF ALL RESPONSES TO PHQ QUESTIONS 1-9: 18

## 2022-02-26 LAB — BACTERIA UR CULT: NORMAL

## 2022-03-01 ENCOUNTER — PRE VISIT (OUTPATIENT)
Dept: SURGERY | Facility: CLINIC | Age: 62
End: 2022-03-01
Payer: COMMERCIAL

## 2022-03-02 ENCOUNTER — MYC MEDICAL ADVICE (OUTPATIENT)
Dept: INTERNAL MEDICINE | Facility: CLINIC | Age: 62
End: 2022-03-02
Payer: COMMERCIAL

## 2022-03-02 DIAGNOSIS — M79.7 FIBROMYALGIA: Chronic | ICD-10-CM

## 2022-03-03 ENCOUNTER — ANCILLARY PROCEDURE (OUTPATIENT)
Dept: MRI IMAGING | Facility: CLINIC | Age: 62
End: 2022-03-03
Attending: NURSE PRACTITIONER
Payer: COMMERCIAL

## 2022-03-03 DIAGNOSIS — G44.52 NEW DAILY PERSISTENT HEADACHE: ICD-10-CM

## 2022-03-03 PROCEDURE — 70551 MRI BRAIN STEM W/O DYE: CPT | Mod: TC | Performed by: RADIOLOGY

## 2022-03-03 RX ORDER — LIDOCAINE 50 MG/G
PATCH TOPICAL
Qty: 30 PATCH | Refills: 1 | Status: SHIPPED | OUTPATIENT
Start: 2022-03-03 | End: 2023-04-20

## 2022-03-03 NOTE — CONFIDENTIAL NOTE
Not on med list... please triage this request.   What is dx?    There IS salonpas otc  We will run into a wall if this is for dejenerative joint arthritis, as insurance companies tend not to cover for dx other than neuopathic pain...     And looking for gel? Pads?     Sorry, I need more help on this.  Thank you!!

## 2022-03-04 ENCOUNTER — TELEPHONE (OUTPATIENT)
Dept: FAMILY MEDICINE | Facility: CLINIC | Age: 62
End: 2022-03-04
Payer: COMMERCIAL

## 2022-03-04 ENCOUNTER — TELEPHONE (OUTPATIENT)
Dept: GASTROENTEROLOGY | Facility: CLINIC | Age: 62
End: 2022-03-04
Payer: COMMERCIAL

## 2022-03-04 DIAGNOSIS — Z11.59 ENCOUNTER FOR SCREENING FOR OTHER VIRAL DISEASES: Primary | ICD-10-CM

## 2022-03-04 RX ORDER — POLYETHYLENE GLYCOL 3350, SODIUM SULFATE ANHYDROUS, SODIUM BICARBONATE, SODIUM CHLORIDE, POTASSIUM CHLORIDE 236; 22.74; 6.74; 5.86; 2.97 G/4L; G/4L; G/4L; G/4L; G/4L
POWDER, FOR SOLUTION ORAL
COMMUNITY
Start: 2022-02-25 | End: 2023-04-20

## 2022-03-04 NOTE — TELEPHONE ENCOUNTER
Prior Authorization Retail Medication Request    Medication/Dose:   ICD code (if different than what is on RX):  M79.7  Previously Tried and Failed:    Rationale:     Insurance Name:  [facility]   Insurance ID:  [id number]      Pharmacy Information (if different than what is on RX)  Name:  [facility]  Phone:  [phone]

## 2022-03-04 NOTE — TELEPHONE ENCOUNTER
Caller: Leticia Pereira      Procedure: Colonoscopy     Date, Location, and Surgeon of Procedure Cancelled: 03/11/2022, [facility] , [place]       Reason for cancel (please be detailed, any staff messages or encounters to note?):     Per pt - work conflict         Rescheduled: no

## 2022-03-04 NOTE — TELEPHONE ENCOUNTER
Patient called back and wanted to be put back on schedule on 3/11 ph. I added her back for colon and covid

## 2022-03-08 ENCOUNTER — TELEPHONE (OUTPATIENT)
Dept: FAMILY MEDICINE | Facility: CLINIC | Age: 62
End: 2022-03-08

## 2022-03-08 ENCOUNTER — LAB (OUTPATIENT)
Dept: LAB | Facility: CLINIC | Age: 62
End: 2022-03-08
Payer: COMMERCIAL

## 2022-03-08 DIAGNOSIS — Z11.59 ENCOUNTER FOR SCREENING FOR OTHER VIRAL DISEASES: ICD-10-CM

## 2022-03-08 PROCEDURE — U0003 INFECTIOUS AGENT DETECTION BY NUCLEIC ACID (DNA OR RNA); SEVERE ACUTE RESPIRATORY SYNDROME CORONAVIRUS 2 (SARS-COV-2) (CORONAVIRUS DISEASE [COVID-19]), AMPLIFIED PROBE TECHNIQUE, MAKING USE OF HIGH THROUGHPUT TECHNOLOGIES AS DESCRIBED BY CMS-2020-01-R: HCPCS

## 2022-03-08 PROCEDURE — U0005 INFEC AGEN DETEC AMPLI PROBE: HCPCS

## 2022-03-08 NOTE — TELEPHONE ENCOUNTER
Prior Authorization Retail Medication Request    Medication/Dose: lidocaine (LIDODERM) 5 % patch  ICD code (if different than what is on RX):  M79.7      Insurance Name:  [facility]  Insurance ID:  [id number]       Pharmacy Information (if different than what is on RX)  Name:  [facility]  Phone:  [phone]

## 2022-03-09 LAB — SARS-COV-2 RNA RESP QL NAA+PROBE: NEGATIVE

## 2022-03-10 NOTE — H&P
[facility] History and Physical    Georgette Pereira MRN# [id number]   Age: 61 year old YOB: 1960     Date of Admission:  (Not on file)    Home clinic: [facility]  Primary care provider: Yue Bermudez          Impression and Plan:   Impression:   Special screening for malignant neoplasms, colon [Z12.11]  Family history of colon cancer in father in 60s  Last colonoscopy 2016 polyp      Plan:   Proceed to Colonoscopy as planned.  The procedure, risks(bleeding, perforation), benefits and alternatives were discussed and the patient agrees to proceed. Cleared for Anesthesia             Chief Complaint:   Special screening for malignant neoplasms, colon [Z12.11]    History is obtained from the patient          History of Present Illness:   This 61 year old female is being seen at this time for evaluation for colonoscopy.  No complaints.  Family hx of colon CA.           Past Medical History:     Past Medical History:   Diagnosis Date     ACL (anterior cruciate ligament) tear 2012    Left      Chronic obstructive pulmonary disease with acute exacerbation (H) 2014    First hospitalization 14      CKD (chronic kidney disease) stage 3, GFR 30-59 ml/min (H) 2020     CTS (carpal tunnel syndrome) 2010     Degenerative joint disease      Temporomandibular joint disorders, unspecified             Past Surgical History:     Past Surgical History:   Procedure Laterality Date     HYSTERECTOMY VAGINAL       ZZC TMJ ARTHROSCOPY/SURGERY              Social History:     Social History     Tobacco Use     Smoking status: Former Smoker     Packs/day: 1.00     Years: 32.00     Pack years: 32.00     Types: Cigarettes, Other     Quit date: 2012     Years since quittin.1     Smokeless tobacco: Never Used     Tobacco comment: using nicotine replacement: e cigarattes   Substance Use Topics     Alcohol use: No     Alcohol/week: 0.0 standard drinks             Family History:     Family History   Problem Relation Age of Onset     Breast Cancer Maternal Grandmother      Coronary Artery Disease Maternal Grandmother         MI at 41     Cerebrovascular Disease Mother      Hypertension Mother      Heart Disease Mother      Cancer Mother         Lung     Substance Abuse Mother      Cerebrovascular Disease Father      Hypertension Father      Cancer Father         Metastatic, primary lung/Prostate     Substance Abuse Father      Cancer Other      Substance Abuse Brother      Depression Son      Schizophrenia Brother      Bipolar Disorder Brother      Substance Abuse Brother      Aneurysm Paternal Uncle         Brain Aneurysms            Immunizations:     VACCINE/DOSE   Diptheria   DPT   DTAP   HBIG   Hepatitis A   Hepatitis B   HIB   Influenza   Measles   Meningococcal   MMR   Mumps   Pneumococcal   Polio   Rubella   Small Pox   TDAP   Varicella   Zoster            Allergies:     Allergies   Allergen Reactions     Acetaminophen      Buspirone Nausea     Hydrocodone      Lisinopril Cough     Vicodin [Hydrocodone-Acetaminophen] Nausea and Vomiting            Medications:     No current facility-administered medications for this encounter.     Current Outpatient Medications   Medication Sig     albuterol (PROAIR HFA/PROVENTIL HFA/VENTOLIN HFA) 108 (90 Base) MCG/ACT inhaler Inhale 2 puffs into the lungs every 6 hours as needed for shortness of breath / dyspnea or wheezing     albuterol (PROVENTIL) (2.5 MG/3ML) 0.083% neb solution Take 1 vial (2.5 mg) by nebulization 4 times daily     amoxicillin-clavulanate (AUGMENTIN) 875-125 MG tablet Take 1 tablet by mouth 2 times daily for 10 days     aspirin (ASA) 325 MG EC tablet Take 325 mg by mouth every 6 hours as needed     clobetasol (TEMOVATE) 0.05 % external ointment Apply topically 2 times daily     diazepam (VALIUM) 10 MG tablet TAKE 1/2 TO 1 TABLET BY MOUTH DAILY AS NEEDED FOR ANXIETY     DULoxetine (CYMBALTA) 30 MG  capsule Take 1 capsule (30 mg) by mouth 2 times daily     estradiol (VAGIFEM) 10 MCG TABS vaginal tablet Place 1 tablet (10 mcg) vaginally twice a week     lidocaine (LIDODERM) 5 % patch Apply up to 3 patches to painful area at once for up to 12 h within a 24 h period.  Remove after 12 hours.     losartan-hydrochlorothiazide (HYZAAR) 100-25 MG tablet TAKE 1 TABLET BY MOUTH DAILY     metoprolol succinate ER (TOPROL-XL) 50 MG 24 hr tablet Take 1 tablet (50 mg) by mouth daily     metroNIDAZOLE (FLAGYL) 500 MG tablet Take 1 tablet (500 mg) by mouth 2 times daily (Patient not taking: Reported on 2/24/2022)     mometasone-formoterol (DULERA) 200-5 MCG/ACT inhaler Inhale 2 puffs into the lungs 2 times daily     nicotine (NICODERM CQ) 21 MG/24HR 24 hr patch Place 1 patch onto the skin every 24 hours     omeprazole (PRILOSEC) 20 MG DR capsule Take 1 capsule (20 mg) by mouth daily     PEG 3350-KCl-NaBcb-NaCl-NaSulf (PEG-3350/ELECTROLYTES) 236 g SOLR TAKE 8000 ML BY MOUTH 1 TIME FOR 1 DOSE. FOLLOW PREP INSTRUCTIONS SENT VIA MY CHART     rosuvastatin (CRESTOR) 10 MG tablet Take 2 tablets (20 mg) by mouth daily     tiotropium (SPIRIVA) 18 MCG inhaled capsule Inhale 1 capsule (18 mcg) into the lungs daily     triamcinolone (KENALOG) 0.1 % external cream APPLY TOPICALLY TO THE AFFECTED AREA TWICE DAILY             Review of Systems:   The review of systems was positive for the following findings.  None.  The remainder of the review of systems was unremarkable.          Physical Exam:   All vitals have been reviewed  not currently breastfeeding.  No intake or output data in the 24 hours ending 03/10/22 0838  SHEENT examination revealed NC/aT, EOMI.  Examination of the chest revealed CTA.  Examination of the heart revealed RRR.  Examination of the abdomen revealed soft, non tender.  The neuromuscular examination was NL.          Data:   All laboratory data reviewed  No results found for any visits on 03/11/22.  Danielle Cai  MD [facility], FACS

## 2022-03-10 NOTE — TELEPHONE ENCOUNTER
PA Initiation    Medication: lidocaine (LIDODERM) 5 % patch  Insurance Company: [employer] - Phone [phone] Fax [phone]  Pharmacy Filling the Rx: [facility] #[id number] - [place] - [address] [address]  Filling Pharmacy Phone: [phone]  Filling Pharmacy Fax: [phone]  Start Date: 3/10/2022

## 2022-03-11 ENCOUNTER — ANESTHESIA (OUTPATIENT)
Dept: GASTROENTEROLOGY | Facility: CLINIC | Age: 62
End: 2022-03-11
Payer: COMMERCIAL

## 2022-03-11 ENCOUNTER — ANESTHESIA EVENT (OUTPATIENT)
Dept: GASTROENTEROLOGY | Facility: CLINIC | Age: 62
End: 2022-03-11
Payer: COMMERCIAL

## 2022-03-11 ENCOUNTER — HOSPITAL ENCOUNTER (OUTPATIENT)
Facility: CLINIC | Age: 62
Discharge: HOME | End: 2022-03-11
Attending: SPECIALIST | Admitting: SPECIALIST
Payer: COMMERCIAL

## 2022-03-11 VITALS
SYSTOLIC BLOOD PRESSURE: 148 MMHG | DIASTOLIC BLOOD PRESSURE: 86 MMHG | OXYGEN SATURATION: 98 % | TEMPERATURE: 98.3 F | RESPIRATION RATE: 16 BRPM

## 2022-03-11 LAB — COLONOSCOPY: NORMAL

## 2022-03-11 PROCEDURE — 370N000017 HC ANESTHESIA TECHNICAL FEE, PER MIN: Performed by: SPECIALIST

## 2022-03-11 PROCEDURE — G0105 COLORECTAL SCRN; HI RISK IND: HCPCS | Performed by: SPECIALIST

## 2022-03-11 PROCEDURE — 45378 DIAGNOSTIC COLONOSCOPY: CPT | Performed by: SPECIALIST

## 2022-03-11 PROCEDURE — 258N000003 HC RX IP 258 OP 636: Performed by: NURSE ANESTHETIST, CERTIFIED REGISTERED

## 2022-03-11 PROCEDURE — 250N000009 HC RX 250: Performed by: NURSE ANESTHETIST, CERTIFIED REGISTERED

## 2022-03-11 PROCEDURE — 250N000011 HC RX IP 250 OP 636: Performed by: NURSE ANESTHETIST, CERTIFIED REGISTERED

## 2022-03-11 RX ORDER — LIDOCAINE 40 MG/G
CREAM TOPICAL
Status: DISCONTINUED | OUTPATIENT
Start: 2022-03-11 | End: 2022-03-11 | Stop reason: HOSPADM

## 2022-03-11 RX ORDER — PROPOFOL 10 MG/ML
INJECTION, EMULSION INTRAVENOUS CONTINUOUS PRN
Status: DISCONTINUED | OUTPATIENT
Start: 2022-03-11 | End: 2022-03-11

## 2022-03-11 RX ORDER — LIDOCAINE HYDROCHLORIDE 20 MG/ML
INJECTION, SOLUTION INFILTRATION; PERINEURAL PRN
Status: DISCONTINUED | OUTPATIENT
Start: 2022-03-11 | End: 2022-03-11

## 2022-03-11 RX ORDER — SODIUM CHLORIDE, SODIUM LACTATE, POTASSIUM CHLORIDE, CALCIUM CHLORIDE 600; 310; 30; 20 MG/100ML; MG/100ML; MG/100ML; MG/100ML
INJECTION, SOLUTION INTRAVENOUS CONTINUOUS
Status: DISCONTINUED | OUTPATIENT
Start: 2022-03-11 | End: 2022-03-11 | Stop reason: HOSPADM

## 2022-03-11 RX ORDER — PROPOFOL 10 MG/ML
INJECTION, EMULSION INTRAVENOUS PRN
Status: DISCONTINUED | OUTPATIENT
Start: 2022-03-11 | End: 2022-03-11

## 2022-03-11 RX ADMIN — SODIUM CHLORIDE, POTASSIUM CHLORIDE, SODIUM LACTATE AND CALCIUM CHLORIDE: 600; 310; 30; 20 INJECTION, SOLUTION INTRAVENOUS at 14:06

## 2022-03-11 RX ADMIN — PROPOFOL 100 MG: 10 INJECTION, EMULSION INTRAVENOUS at 14:13

## 2022-03-11 RX ADMIN — LIDOCAINE HYDROCHLORIDE 1 ML: 10 INJECTION, SOLUTION EPIDURAL; INFILTRATION; INTRACAUDAL; PERINEURAL at 14:06

## 2022-03-11 RX ADMIN — LIDOCAINE HYDROCHLORIDE 60 MG: 20 INJECTION, SOLUTION INFILTRATION; PERINEURAL at 14:13

## 2022-03-11 RX ADMIN — PROPOFOL 150 MCG/KG/MIN: 10 INJECTION, EMULSION INTRAVENOUS at 14:13

## 2022-03-11 ASSESSMENT — COPD QUESTIONNAIRES
COPD: 1
CAT_SEVERITY: MODERATE

## 2022-03-11 ASSESSMENT — LIFESTYLE VARIABLES: TOBACCO_USE: 1

## 2022-03-11 NOTE — TELEPHONE ENCOUNTER
PRIOR AUTHORIZATION DENIED    Medication: lidocaine (LIDODERM) 5 % patch    Denial Date: 3/11/2022    Denial Rational: Lidoderm patches are only covered with the diagnosis of post-herpetic neuralgia, diabetic neuropathy, or cancer related pain. It will not be covered for the associated diagnosis.       Appeal Information:

## 2022-03-11 NOTE — DISCHARGE INSTRUCTIONS
[facility]    Home Care Following Endoscopy          Activity:    You have just undergone an endoscopic procedure usually performed with conscious sedation.  Do not work or operate machinery (including a car) for at least 12 hours.      I encourage you to walk and attempt to pass this air as soon as possible.    Diet:    Return to the diet you were on before your procedure but eat lightly for the first 12-24 hours.    Drink plenty of water.    Resume any regular medications unless otherwise advised by your physician.  Please begin any new medication prescribed as a result of your procedure as directed by your physician.     If you had any biopsy or polyp removed please refrain from aspirin or aspirin products for 2 days.    Pain:    You may take Tylenol as needed for pain.  Expected Recovery:    You can expect some mild abdominal fullness and/or discomfort due to the air used to inflate your intestinal tract.     Call Your Physician if You Have:    After Colonoscopy:  o Worsening persisting abdominal pain which is worse with activity.  o Fevers (>101 degrees F), chills or shakes.  o Passage of continued blood with bowel movements.   Any questions or concerns about your recovery, please call [phone] or after hours [phone] Nurse Advice Line.

## 2022-03-11 NOTE — ANESTHESIA PREPROCEDURE EVALUATION
Anesthesia Pre-Procedure Evaluation    Patient: Georgette Pereria   MRN: [id number] : 1960        Procedure : Procedure(s):  COLONOSCOPY          Past Medical History:   Diagnosis Date     ACL (anterior cruciate ligament) tear 2012    Left      Chronic obstructive pulmonary disease with acute exacerbation (H) 2014    First hospitalization 14      CKD (chronic kidney disease) stage 3, GFR 30-59 ml/min (H) 2020     CTS (carpal tunnel syndrome) 2010     Degenerative joint disease      Temporomandibular joint disorders, unspecified       Past Surgical History:   Procedure Laterality Date     HYSTERECTOMY VAGINAL       ZZC TMJ ARTHROSCOPY/SURGERY        Allergies   Allergen Reactions     Acetaminophen      Buspirone Nausea     Hydrocodone      Lisinopril Cough     Vicodin [Hydrocodone-Acetaminophen] Nausea and Vomiting      Social History     Tobacco Use     Smoking status: Former Smoker     Packs/day: 1.00     Years: 32.00     Pack years: 32.00     Types: Cigarettes, Other     Quit date: 2012     Years since quittin.1     Smokeless tobacco: Never Used     Tobacco comment: using nicotine replacement: e cigarattes   Substance Use Topics     Alcohol use: No     Alcohol/week: 0.0 standard drinks      Wt Readings from Last 1 Encounters:   22 61.2 kg (135 lb)        Anesthesia Evaluation            ROS/MED HX  ENT/Pulmonary: Comment: Quit Smokin12  Pulmonary nodules    (+) tobacco use, Past use, 1 packs/day, 32  Pack-Year Hx,  moderate,  COPD,     Neurologic: Comment: Adult ADHD      Cardiovascular:     (+) hypertension-range: < 140/90/ ----    METS/Exercise Tolerance:     Hematologic:  - neg hematologic  ROS     Musculoskeletal: Comment: Primary osteoarthritis involving multiple joints  (+) arthritis,     GI/Hepatic:       Renal/Genitourinary: Comment: Stage III CRI    (+) renal disease, type: CRI,     Endo:  - neg endo ROS     Psychiatric/Substance Use:     (+)  psychiatric history anxiety and depression     Infectious Disease:  - neg infectious disease ROS     Malignancy:  - neg malignancy ROS     Other:  - neg other ROS          Physical Exam    Airway  airway exam normal      Mallampati: II   TM distance: > 3 FB   Neck ROM: full   Mouth opening: > 3 cm    Respiratory Devices and Support         Dental  no notable dental history         Cardiovascular   cardiovascular exam normal       Rhythm and rate: regular and normal     Pulmonary   pulmonary exam normal        breath sounds clear to auscultation           OUTSIDE LABS:  CBC:   Lab Results   Component Value Date    WBC 8.2 06/15/2021    WBC 7.3 12/08/2016    HGB 15.8 (H) 06/15/2021    HGB 15.4 12/08/2016    HCT 49.5 (H) 06/15/2021    HCT 47.0 12/08/2016     06/15/2021     12/08/2016     BMP:   Lab Results   Component Value Date     06/18/2021     06/15/2021    POTASSIUM 4.0 06/18/2021    POTASSIUM 3.7 06/15/2021    CHLORIDE 102 06/18/2021    CHLORIDE 103 06/15/2021    CO2 32 06/18/2021    CO2 34 (H) 06/15/2021    BUN 20 06/18/2021    BUN 14 06/15/2021    CR 1.22 (H) 06/18/2021    CR 1.13 (H) 06/15/2021    GLC 87 06/18/2021    GLC 99 06/15/2021     COAGS: No results found for: PTT, INR, FIBR  POC: No results found for: BGM, HCG, HCGS  HEPATIC:   Lab Results   Component Value Date    ALBUMIN 3.6 03/02/2016    PROTTOTAL 6.9 03/02/2016    ALT 19 03/02/2016    AST 16 03/02/2016    ALKPHOS 147 03/02/2016    BILITOTAL 0.5 03/02/2016     OTHER:   Lab Results   Component Value Date    A1C 5.8 (H) 01/21/2022    MICHELLE 9.5 06/18/2021    TSH 1.73 06/15/2021    T4 1.15 05/13/2010    CRP <2.9 01/21/2022    SED 10 01/21/2022       Anesthesia Plan    ASA Status:  3   NPO Status:  NPO Appropriate    Anesthesia Type: MAC.     - Reason for MAC: straight local not clinically adequate   Induction: Intravenous, Propofol.   Maintenance: TIVA.        Consents    Anesthesia Plan(s) and associated risks, benefits, and  realistic alternatives discussed. Questions answered and patient/representative(s) expressed understanding.    - Discussed:     - Discussed with:  Patient      - Extended Intubation/Ventilatory Support Discussed: No.      - Patient is DNR/DNI Status: No    Use of blood products discussed: No .     Postoperative Care    Pain management: IV analgesics.        Comments:    Other Comments: The risks and benefits of anesthesia, and the alternatives where applicable, have been discussed with the patient, and they wish to proceed.            KATHRYN Wheeler CRNA

## 2022-03-11 NOTE — ANESTHESIA CARE TRANSFER NOTE
Patient: Georgette Pereira    Procedure: Procedure(s):  COLONOSCOPY       Diagnosis: Special screening for malignant neoplasms, colon [Z12.11]  Diagnosis Additional Information: No value filed.    Anesthesia Type:   MAC     Note:    Oropharynx: oropharynx clear of all foreign objects and spontaneously breathing    Oxygen Supplementation: room air    Independent Airway: airway patency satisfactory and stable  Dentition: dentition unchanged  Vital Signs Stable: post-procedure vital signs reviewed and stable  Report to RN Given: handoff report given  Patient transferred to: Phase II    Handoff Report: Identifed the Patient, Identified the Reponsible Provider, Reviewed the pertinent medical history, Discussed the surgical course, Reviewed Intra-OP anesthesia mangement and issues during anesthesia, Set expectations for post-procedure period and Allowed opportunity for questions and acknowledgement of understanding      Vitals:  Vitals Value Taken Time   /87 03/11/22 1439   Temp     Pulse     Resp 16 03/11/22 1439   SpO2 99 % 03/11/22 1439       Electronically Signed By: KATHRYN Wheeler CRNA  March 11, 2022  2:52 PM

## 2022-03-11 NOTE — TELEPHONE ENCOUNTER
Patient notified of provider's message as written. Patient verbalized understanding.     Francine Rich RN   [facility]

## 2022-03-11 NOTE — TELEPHONE ENCOUNTER
Notify patient Rx for lidoderm patches denied.  She can ask pharmacist if they have OTC equivalent.

## 2022-03-11 NOTE — ANESTHESIA POSTPROCEDURE EVALUATION
Patient: Georgette Pereira    Procedure: Procedure(s):  COLONOSCOPY       Anesthesia Type:  MAC    Note:  Disposition: Outpatient   Postop Pain Control: Uneventful            Sign Out: Well controlled pain   PONV: No   Neuro/Psych: Uneventful            Sign Out: Acceptable/Baseline neuro status   Airway/Respiratory: Uneventful            Sign Out: Acceptable/Baseline resp. status   CV/Hemodynamics: Uneventful            Sign Out: Acceptable CV status   Other NRE: NONE   DID A NON-ROUTINE EVENT OCCUR? No    Event details/Postop Comments:  Pt was happy with anesthesia care.  No complications.  I will follow up with the pt if needed.           Last vitals:  Vitals Value Taken Time   /87 03/11/22 1439   Temp     Pulse     Resp 16 03/11/22 1439   SpO2 99 % 03/11/22 1439       Electronically Signed By: KATHRYN Wheeler CRNA  March 11, 2022  2:53 PM

## 2022-03-14 NOTE — TELEPHONE ENCOUNTER
Central Prior Authorization Team   Phone: [phone]      PRIOR AUTHORIZATION DENIED    Medication: lidocaine 5 %--INITIATED    Denial Date: 3/14/2022    Denial Rational:      Appeal Information:  SEE ABOVE    IF NO APPEAL IS WANTED PLEASE CLOSE ENCOUNTER

## 2022-03-14 NOTE — TELEPHONE ENCOUNTER
Central Prior Authorization Team   Phone: [phone]      PA Initiation    Medication: lidocaine 5 %--INITIATED  Insurance Company: [employer] - Phone [phone] Fax [phone]  Pharmacy Filling the Rx: [id number] - [place] - [address] [address]  Filling Pharmacy Phone: [phone]  Filling Pharmacy Fax:    Start Date: 3/14/2022

## 2022-03-31 ENCOUNTER — VIRTUAL VISIT (OUTPATIENT)
Dept: INTERNAL MEDICINE | Facility: CLINIC | Age: 62
End: 2022-03-31
Payer: COMMERCIAL

## 2022-03-31 DIAGNOSIS — E78.5 HYPERLIPIDEMIA LDL GOAL <130: ICD-10-CM

## 2022-03-31 DIAGNOSIS — J44.9 COPD, MODERATE (H): ICD-10-CM

## 2022-03-31 DIAGNOSIS — Z86.73 HISTORY OF CVA (CEREBROVASCULAR ACCIDENT) WITHOUT RESIDUAL DEFICITS: ICD-10-CM

## 2022-03-31 DIAGNOSIS — F33.1 MODERATE RECURRENT MAJOR DEPRESSION (H): ICD-10-CM

## 2022-03-31 DIAGNOSIS — I10 ESSENTIAL HYPERTENSION WITH GOAL BLOOD PRESSURE LESS THAN 140/90: Primary | ICD-10-CM

## 2022-03-31 DIAGNOSIS — F43.23 ADJUSTMENT DISORDER WITH MIXED ANXIETY AND DEPRESSED MOOD: ICD-10-CM

## 2022-03-31 PROCEDURE — 99214 OFFICE O/P EST MOD 30 MIN: CPT | Mod: 95 | Performed by: INTERNAL MEDICINE

## 2022-03-31 NOTE — PROGRESS NOTES
"Amie is a 61 year old who is being evaluated via a billable video visit.      How would you like to obtain your AVS? MyChart  If the video visit is dropped, the invitation should be resent by: Text to cell phone: [phone]  Will anyone else be joining your video visit? No     Video Start Time: 5:29    Assessment & Plan     Essential hypertension with goal blood pressure less than 140/90  Controlled      COPD, moderate (H)   she is on inhalers  She quit smoking.     Hyperlipidemia LDL goal <130   continue current management       Moderate recurrent major depression (H)   she would like  referral. Wants to have prescriber evaluate and treat... wants to have therapy.   Adjustment disorder with mixed anxiety and depressed mood   - Adult Mental Health  Referral; Future  - Adult Mental Health  Referral; Future    History of CVA (cerebrovascular accident) without residual deficits  No sequelae.         I spent a total of 30 minutes on the day of the visit.   Time spent doing chart review, history and exam, documentation and further activities per the note        See Patient Instructions    Return in about 3 months (around 6/30/2022) for Physical Exam, Lab Work.    Yue Bermudez MD  [facility] [place]    Subjective   Amie is a 61 year old who presents for the following health issues     62 y/o F tel visit to \"review labs\"   h/o COPD, HTN, DJD, PAD, Catrachito use, pulmonary nodule (2mm solid LLL, 8/2019), CKD3a....     Last summer, she had R MCA/LPCA stroke.  Treated with dual Antiplatelet therapy for 90d, now on asa.  Last month, she saw colleague due to c/o recurrent headache (similer but less intense than the stroke) and MRI recently negative.   BP have been 140/85  Still has headaches.    Using tylenol alternating with ibuprofen.   Can help for 3h.          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  She likes it.           History of Present Illness       Reason for visit:  Dr." Ray requested this to go over test results  Symptom onset:  More than a month    She eats 0-1 servings of fruits and vegetables daily.She consumes 2 sweetened beverage(s) daily.She exercises with enough effort to increase her heart rate 9 or less minutes per day.  She exercises with enough effort to increase her heart rate 3 or less days per week. She is missing 2 dose(s) of medications per week.  She is not taking prescribed medications regularly due to remembering to take.             Review of Systems   Constitutional, HEENT, cardiovascular, pulmonary, gi and gu systems are negative, except as otherwise noted.      Objective           Vitals:  No vitals were obtained today due to virtual visit.    Physical Exam   GENERAL: Healthy, alert and no distress  EYES: Eyes grossly normal to inspection.  No discharge or erythema, or obvious scleral/conjunctival abnormalities.  RESP: No audible wheeze, cough, or visible cyanosis.  No visible retractions or increased work of breathing.    SKIN: Visible skin clear. No significant rash, abnormal pigmentation or lesions.  NEURO: Cranial nerves grossly intact.  Mentation and speech appropriate for age.  PSYCH: Mentation appears normal, affect normal/bright, judgement and insight intact, normal speech and appearance well-groomed.    Lab on 03/08/2022   Component Date Value Ref Range Status     SARS CoV2 PCR 03/08/2022 Negative  Negative, Testing sent to reference lab. Results will be returned via unsolicited result Final    NEGATIVE: SARS-CoV-2 (COVID-19) RNA not detected, presumed negative.               Video-Visit Details    Type of service:  Video Visit    Video End Time:6:00    Originating Location (pt. Location): Home    Distant Location (provider location):  [facility]     Platform used for Video Visit: [employer]

## 2022-03-31 NOTE — PATIENT INSTRUCTIONS
Work on stopping daily tylenol/ibuprofen  (Reboundf headaches)    Mental health referral:  They will call you.         Vaccines recommended:  1) Tdap (tetanus with pertussis booster)  2) Shingrix Series (2 shots 8 weeks apart)  3) Pfizer Booster (3 of 3)

## 2022-04-12 ENCOUNTER — OFFICE VISIT (OUTPATIENT)
Dept: NURSING | Facility: CLINIC | Age: 62
End: 2022-04-12
Payer: COMMERCIAL

## 2022-04-12 VITALS — WEIGHT: 139 LBS | HEART RATE: 69 BPM | BODY MASS INDEX: 22.44 KG/M2 | OXYGEN SATURATION: 99 %

## 2022-04-12 DIAGNOSIS — J44.9 COPD, MODERATE (H): Primary | ICD-10-CM

## 2022-04-12 DIAGNOSIS — R06.02 SHORTNESS OF BREATH: ICD-10-CM

## 2022-04-12 DIAGNOSIS — Z87.891 PERSONAL HISTORY OF TOBACCO USE, PRESENTING HAZARDS TO HEALTH: ICD-10-CM

## 2022-04-12 DIAGNOSIS — J44.9 CHRONIC OBSTRUCTIVE PULMONARY DISEASE, UNSPECIFIED COPD TYPE (H): ICD-10-CM

## 2022-04-12 LAB
DLCOUNC-%PRED-PRE: 67 %
DLCOUNC-PRE: 13.99 ML/MIN/MMHG
DLCOUNC-PRED: 20.83 ML/MIN/MMHG
ERV-%PRED-PRE: 104 %
ERV-PRE: 0.98 L
ERV-PRED: 0.93 L
EXPTIME-PRE: 11.04 SEC
FEF2575-%PRED-PRE: 24 %
FEF2575-PRE: 0.55 L/SEC
FEF2575-PRED: 2.28 L/SEC
FEFMAX-%PRED-PRE: 52 %
FEFMAX-PRE: 3.35 L/SEC
FEFMAX-PRED: 6.39 L/SEC
FEV1-%PRED-PRE: 58 %
FEV1-PRE: 1.48 L
FEV1FEV6-PRE: 53 %
FEV1FEV6-PRED: 81 %
FEV1FVC-PRE: 47 %
FEV1FVC-PRED: 79 %
FEV1SVC-PRE: 50 %
FEV1SVC-PRED: 76 %
FIFMAX-PRE: 3.92 L/SEC
FRCPLETH-%PRED-PRE: 152 %
FRCPLETH-PRE: 4.21 L
FRCPLETH-PRED: 2.76 L
FVC-%PRED-PRE: 97 %
FVC-PRE: 3.16 L
FVC-PRED: 3.25 L
IC-%PRED-PRE: 82 %
IC-PRE: 2.01 L
IC-PRED: 2.45 L
RVPLETH-%PRED-PRE: 164 %
RVPLETH-PRE: 3.24 L
RVPLETH-PRED: 1.96 L
TLCPLETH-%PRED-PRE: 121 %
TLCPLETH-PRE: 6.23 L
TLCPLETH-PRED: 5.11 L
VA-%PRED-PRE: 93 %
VA-PRE: 4.66 L
VC-%PRED-PRE: 88 %
VC-PRE: 2.99 L
VC-PRED: 3.38 L

## 2022-04-12 PROCEDURE — 94726 PLETHYSMOGRAPHY LUNG VOLUMES: CPT | Performed by: INTERNAL MEDICINE

## 2022-04-12 PROCEDURE — 94729 DIFFUSING CAPACITY: CPT | Performed by: INTERNAL MEDICINE

## 2022-04-12 PROCEDURE — 94375 RESPIRATORY FLOW VOLUME LOOP: CPT | Performed by: INTERNAL MEDICINE

## 2022-05-05 ENCOUNTER — OFFICE VISIT (OUTPATIENT)
Dept: PULMONOLOGY | Facility: CLINIC | Age: 62
End: 2022-05-05
Payer: COMMERCIAL

## 2022-05-05 VITALS
DIASTOLIC BLOOD PRESSURE: 80 MMHG | HEART RATE: 67 BPM | WEIGHT: 139.9 LBS | SYSTOLIC BLOOD PRESSURE: 126 MMHG | BODY MASS INDEX: 22.58 KG/M2 | OXYGEN SATURATION: 96 %

## 2022-05-05 DIAGNOSIS — Z87.891 PERSONAL HISTORY OF TOBACCO USE: Primary | ICD-10-CM

## 2022-05-05 DIAGNOSIS — Z87.891 PERSONAL HISTORY OF TOBACCO USE, PRESENTING HAZARDS TO HEALTH: ICD-10-CM

## 2022-05-05 DIAGNOSIS — J44.9 COPD, MODERATE (H): ICD-10-CM

## 2022-05-05 DIAGNOSIS — R06.02 SHORTNESS OF BREATH: ICD-10-CM

## 2022-05-05 DIAGNOSIS — J44.9 CHRONIC OBSTRUCTIVE PULMONARY DISEASE, UNSPECIFIED COPD TYPE (H): ICD-10-CM

## 2022-05-05 DIAGNOSIS — R91.8 PULMONARY NODULES: ICD-10-CM

## 2022-05-05 PROCEDURE — G0296 VISIT TO DETERM LDCT ELIG: HCPCS | Performed by: INTERNAL MEDICINE

## 2022-05-05 PROCEDURE — 99215 OFFICE O/P EST HI 40 MIN: CPT | Mod: 25 | Performed by: INTERNAL MEDICINE

## 2022-05-05 RX ORDER — TIOTROPIUM BROMIDE 18 UG/1
18 CAPSULE ORAL; RESPIRATORY (INHALATION) DAILY
Qty: 90 CAPSULE | Refills: 3 | Status: SHIPPED | OUTPATIENT
Start: 2022-05-05 | End: 2023-10-19

## 2022-05-05 RX ORDER — ALBUTEROL SULFATE 90 UG/1
2 AEROSOL, METERED RESPIRATORY (INHALATION) EVERY 6 HOURS PRN
Qty: 18 G | Refills: 4 | Status: SHIPPED | OUTPATIENT
Start: 2022-05-05 | End: 2023-06-02

## 2022-05-05 NOTE — PROGRESS NOTES
Pulmonary Clinic New Patient Consult  Reason for Consult: COPD  History of Present Illness  I had the pleasure of seeing Amie Pereira, who is a pleasant 61 yof with history of COPD with moderately severe obstruction on PFTs who presents to clinic for follow up for same. She was last seen in clinic by my colleague- Dr. Blas  To briefly review, Amie has severe COPD with considerable SOB and exercise limitation. Her COPD care was complicated with some confusion as to her maintenance vs rescue inhalers which was clarified during the last visit.    She is on a regimen of Spiriva and Dulera as well as albuterol as needed.  Today, she is doing somewhat better.  At present she would be able to walk 3 - 4  blocks before she would need to stop and rest which a slight improvement from before.  She denies any episodes of pneumonia or AECOPD since the last visit.  She continues to vape - a few puffs per day.  She previously smoked up to 1/2 pack per day cigarettes for 30 years and quit about 13 years ago and has vaped since that time. She uses her albuterol inhaler every day. No orthopnea, pedal swellings or weight loss.    Review of Systems:  10 of 14 systems reviewed and are negative unless otherwise stated in HPI.    Past Medical History:   Diagnosis Date     ACL (anterior cruciate ligament) tear 2/27/2012    Left      Chronic obstructive pulmonary disease with acute exacerbation (H) 11/23/2014    First hospitalization 11/23/14      CKD (chronic kidney disease) stage 3, GFR 30-59 ml/min (H) 6/30/2020     CTS (carpal tunnel syndrome) 5/2010     Degenerative joint disease      Temporomandibular joint disorders, unspecified        Past Surgical History:   Procedure Laterality Date     COLONOSCOPY N/A 3/11/2022    Procedure: COLONOSCOPY;  Surgeon: Trell Courtney MD;  Location:  GI     HYSTERECTOMY VAGINAL       [facility] ARTHROSCOPY/SURGERY  2000       Family History   Problem Relation Age of Onset     Breast Cancer  Maternal Grandmother      Coronary Artery Disease Maternal Grandmother         MI at 41     Cerebrovascular Disease Mother      Hypertension Mother      Heart Disease Mother      Cancer Mother         Lung     Substance Abuse Mother      Cerebrovascular Disease Father      Hypertension Father      Cancer Father         Metastatic, primary lung/Prostate     Substance Abuse Father      Cancer Other      Substance Abuse Brother      Depression Son      Schizophrenia Brother      Bipolar Disorder Brother      Substance Abuse Brother      Aneurysm Paternal Uncle         Brain Aneurysms       Social History     Socioeconomic History     Marital status:      Spouse name: None     Number of children: None     Years of education: None     Highest education level: None   Tobacco Use     Smoking status: Former Smoker     Packs/day: 1.00     Years: 32.00     Pack years: 32.00     Types: Cigarettes, Other     Quit date: 2012     Years since quittin.3     Smokeless tobacco: Never Used     Tobacco comment: using nicotine replacement: e cigarattes   Vaping Use     Vaping Use: Never used   Substance and Sexual Activity     Alcohol use: No     Alcohol/week: 0.0 standard drinks     Drug use: No     Sexual activity: Never     Partners: Male     Birth control/protection: Surgical   Other Topics Concern     Parent/sibling w/ CABG, MI or angioplasty before 65F 55M? Yes   Social History Narrative    Lives in apartment, single ().  2 kids.  Drives [employer].          The patient has a 30 pk yr tobacco hx.  She has no active use but using e -cig. Quit actual cig Oct 2012.  Alcohol use is 0 alcoholic drinks per week.  She denies use of recreational drugs.          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          The patient is single.  Has 2 children.        Hot Tube Exposure: NO    Recent Travel: NO     Hx of incarceration:  NO    Bird Exposure:   NO    Animal Exposure:  NO     Inhalation Exposure:  NO                 Allergies   Allergen Reactions     Acetaminophen      Buspirone Nausea     Hydrocodone      Lisinopril Cough     Vicodin [Hydrocodone-Acetaminophen] Nausea and Vomiting         Current Outpatient Medications:      albuterol (PROAIR HFA/PROVENTIL HFA/VENTOLIN HFA) 108 (90 Base) MCG/ACT inhaler, Inhale 2 puffs into the lungs every 6 hours as needed for shortness of breath / dyspnea or wheezing, Disp: 18 g, Rfl: 4     albuterol (PROVENTIL) (2.5 MG/3ML) 0.083% neb solution, Take 1 vial (2.5 mg) by nebulization 4 times daily, Disp: 100 mL, Rfl: 4     aspirin (ASA) 325 MG EC tablet, Take 325 mg by mouth every 6 hours as needed, Disp: , Rfl:      clobetasol (TEMOVATE) 0.05 % external ointment, Apply topically 2 times daily, Disp: 15 g, Rfl: 0     diazepam (VALIUM) 10 MG tablet, TAKE 1/2 TO 1 TABLET BY MOUTH DAILY AS NEEDED FOR ANXIETY, Disp: 10 tablet, Rfl: 1     DULoxetine (CYMBALTA) 30 MG capsule, Take 1 capsule (30 mg) by mouth 2 times daily, Disp: 180 capsule, Rfl: 3     estradiol (VAGIFEM) 10 MCG TABS vaginal tablet, Place 1 tablet (10 mcg) vaginally twice a week, Disp: 24 tablet, Rfl: 4     lidocaine (LIDODERM) 5 % patch, Apply up to 3 patches to painful area at once for up to 12 h within a 24 h period.  Remove after 12 hours., Disp: 30 patch, Rfl: 1     losartan-hydrochlorothiazide (HYZAAR) 100-25 MG tablet, TAKE 1 TABLET BY MOUTH DAILY, Disp: 90 tablet, Rfl: 1     metoprolol succinate ER (TOPROL-XL) 50 MG 24 hr tablet, Take 1 tablet (50 mg) by mouth daily, Disp: 90 tablet, Rfl: 3     mometasone-formoterol (DULERA) 200-5 MCG/ACT inhaler, Inhale 2 puffs into the lungs 2 times daily, Disp: 13 g, Rfl: 6     nicotine (NICODERM CQ) 21 MG/24HR 24 hr patch, Place 1 patch onto the skin every 24 hours, Disp: 28 patch, Rfl: 0     PEG 3350-KCl-NaBcb-NaCl-NaSulf (PEG-3350/ELECTROLYTES) 236 g SOLR, TAKE 8000 ML BY MOUTH 1 TIME FOR 1 DOSE. FOLLOW PREP INSTRUCTIONS SENT VIA MY CHART, Disp:  , Rfl:      rosuvastatin (CRESTOR) 10 MG tablet, Take 2 tablets (20 mg) by mouth daily, Disp: 90 tablet, Rfl: 3     tiotropium (SPIRIVA) 18 MCG inhaled capsule, Inhale 1 capsule (18 mcg) into the lungs daily, Disp: 90 capsule, Rfl: 3     triamcinolone (KENALOG) 0.1 % external cream, APPLY TOPICALLY TO THE AFFECTED AREA TWICE DAILY, Disp: 80 g, Rfl: 1      Physical Exam:  /80 (BP Location: Left arm, Patient Position: Sitting, Cuff Size: Adult Regular)   Pulse 67   Wt 63.5 kg (139 lb 14.4 oz)   SpO2 96%   BMI 22.58 kg/m    GENERAL: Well developed, well nourished, alert, and in no apparent distress.  HEENT: Normocephalic, atraumatic. PERRL, EOMI. Oral mucosa is moist. No perioral cyanosis.  NECK: supple, no masses, no thyromegaly.  RESP:  Normal respiratory effort.  CTAB.  No rales, wheezes, rhonchi.  No cyanosis or clubbing.  CV: Normal S1, S2, regular rhythm, normal rate. No murmur.  No LE edema.   ABDOMEN:  Soft, non-tender, non-distended.   SKIN: warm and dry. No rash.  NEURO: AAOx3.  Normal gait.  Fluent speech.  PSYCH: mentation appears normal.     Results:  PFTs: Discussed and reviewed with patient- moderate obstruction with impaired diffusion capacity  Most Recent [facility]monary Function Testing    FVC-Pred   Date Value Ref Range Status   04/12/2022 3.25 L      FVC-Pre   Date Value Ref Range Status   04/12/2022 3.16 L      FVC-%Pred-Pre   Date Value Ref Range Status   04/12/2022 97 %      FEV1-Pre   Date Value Ref Range Status   04/12/2022 1.48 L      FEV1-%Pred-Pre   Date Value Ref Range Status   04/12/2022 58 %      FEV1FVC-Pred   Date Value Ref Range Status   04/12/2022 79 %      FEV1FVC-Pre   Date Value Ref Range Status   04/12/2022 47 %      No results found for: 20029  FEFMax-Pred   Date Value Ref Range Status   04/12/2022 6.39 L/sec      FEFMax-Pre   Date Value Ref Range Status   04/12/2022 3.35 L/sec      FEFMax-%Pred-Pre   Date Value Ref Range Status   04/12/2022 52 %      ExpTime-Pre    Date Value Ref Range Status   04/12/2022 11.04 sec      FIFMax-Pre   Date Value Ref Range Status   04/12/2022 3.92 L/sec      FEV1FEV6-Pred   Date Value Ref Range Status   04/12/2022 81 %      FEV1FEV6-Pre   Date Value Ref Range Status   04/12/2022 53 %      No results found for: 20055  Imaging (personally reviewed in clinic today):    Assessment and Plan:   COPD (Group D)    PFTs show moderately severe obstruction, stable with slight improvement compared with previous studies.  Ms. Pereira continues to experience functionally limiting dyspnea but has been without AECOPD since our last visit. She is on triple inhaled regimen with high dose Dulera and Spiriva as well as albuterol as needed. She will benefit from pulmonary rehab and I will refer her to one today to improve her functionality and exercise tolerance. Low suspicion for pulmonary hypertension.  Prescriptions for all three inhalers listed above were renewed today.    History of smoking  Lung Cancer Screening Shared Decision Making Visit   Georgette Pereira, a 61 year old female, is eligible for lung cancer screening  History   Smoking Status     Former Smoker     Packs/day: 1.00     Years: 32.00     Types: Cigarettes, Other     Quit date: 12/31/2012   Smokeless Tobacco     Never Used     Comment: using nicotine replacement: e cigarattes     I have discussed with patient the risks and benefits of screening for lung cancer with low-dose CT.   The risks include:  radiation exposure: one low dose chest CT has as much ionizing radiation as about 15 chest x-rays, or 6 months of background radiation living in [place]    false positives: most findings/nodules are NOT cancer, but some might still require additional diagnostic evaluation, including biopsy  over-diagnosis: some slow growing cancers that might never have been clinically significant will be detected and treated unnecessarily   The benefit of early detection of lung cancer is contingent upon  adherence to annual screening or more frequent follow up if indicated.   Furthermore, to benefit from screening, Georgette must be willing and able to undergo diagnostic procedures, if indicated. Although no specific guide is available for determining severity of comorbidities, it is reasonable to withhold screening in patients who have greater mortality risk from other diseases.   We did discuss that the best way to prevent lung cancer is to not smoke.  Some patients may value a numeric estimation of lung cancer risk when evaluating if lung cancer screening is right for them, here is one calculator:  Questions and concerns were answered to the patient's satisfaction.  she was provided with my contact information should new questions or concerns arise in the interim.  she should return to clinic in 12 months   Up to date on vaccinations (due for shingles vaccine)  I spent a total of 60 minutes face to face with Georgette Pereira during today's office visit excluding time spent on lung cancer screening. Over 50% of this time was spent counseling the patient and/or coordinating care regarding their pulmonary disease.    Lucia Reyes MD  Pulmonary, Critical Care and Sleep Medicine  [facility]  Pager: [phone]        The above note was dictated using voice recognition software and may include typographical errors. Please contact the author for any clarifications.

## 2022-05-05 NOTE — PATIENT INSTRUCTIONS
Lung Cancer Screening   Frequently Asked Questions  If you are at high-risk for lung cancer, getting screened with low-dose computed tomography (LDCT) every year can help save your life. This handout offers answers to some of the most common questions about lung cancer screening. If you have other questions, please call [phone] ([phone]).     What is it?  Lung cancer screening uses special X-ray technology to create an image of your lung tissue. The exam is quick and easy and takes less than 10 seconds. We don t give you any medicine or use any needles. You can eat before and after the exam. You don t need to change your clothes as long as the clothing on your chest doesn t contain metal. But, you do need to be able to hold your breath for at least 6 seconds during the exam.    What is the goal of lung cancer screening?  The goal of lung cancer screening is to save lives. Many times, lung cancer is not found until a person starts having physical symptoms. Lung cancer screening can help detect lung cancer in the earliest stages when it may be easier to treat.    Who should be screened for lung cancer?  We suggest lung cancer screening for anyone who is at high-risk for lung cancer. You are in the high-risk group if you:      are between the ages of 55 and 79, and    have smoked at least 1 pack of cigarettes a day for 20 or more years, and    still smoke or have quit within the past 15 years.    However, if you have a new cough or shortness of breath, you should talk to your doctor before being screened.    Why does it matter if I have symptoms?  Certain symptoms can be a sign that you have a condition in your lungs that should be checked and treated by your doctor. These symptoms include fever, chest pain, a new or changing cough, shortness of breath that you have never felt before, coughing up blood or unexplained weight loss. Having any of these symptoms can greatly affect the results of lung  cancer screening.       Should all smokers get an LDCT lung cancer screening exam?  It depends. Lung cancer screening is for a very specific group of men and women who have a history of heavy smoking over a long period of time (see  Who should be screened for lung cancer  above).  I am in the high-risk group, but have been diagnosed with cancer in the past. Is LDCT lung cancer screening right for me?  In some cases, you should not have LDCT lung screening, such as when your doctor is already following your cancer with CT scan studies. Your doctor will help you decide if LDCT lung screening is right for you.  Do I need to have a screening exam every year?  Yes. If you are in the high-risk group described earlier, you should get an LDCT lung cancer screening exam every year until you are 79, or are no longer willing or able to undergo screening and possible procedures to diagnose and treat lung cancer.  How effective is LDCT at preventing death from lung cancer?  Studies have shown that LDCT lung cancer screening can lower the risk of death from lung cancer by 20 percent in people who are at high-risk.  What are the risks?  There are some risks and limitations of LDCT lung cancer screening. We want to make sure you understand the risks and benefits, so please let us know if you have any questions. Your doctor may want to talk with you more about these risks.    Radiation exposure: As with any exam that uses radiation, there is a very small increased risk of cancer. The amount of radiation in LDCT is small--about the same amount a person would get from a mammogram. Your doctor orders the exam when he or she feels the potential benefits outweigh the risks.    False negatives: No test is perfect, including LDCT. It is possible that you may have a medical condition, including lung cancer, that is not found during your exam. This is called a false negative result.    False positives and more testing: LDCT very often finds  something in the lung that could be cancer, but in fact is not. This is called a false positive result. False positive tests often cause anxiety. To make sure these findings are not cancer, you may need to have more tests. These tests will be done only if you give us permission. Sometimes patients need a treatment that can have side effects, such as a biopsy. For more information on false positives, see  What can I expect from the results?     Findings not related to lung cancer: Your LDCT exam also takes pictures of areas of your body next to your lungs. In a very small number of cases, the CT scan will show an abnormal finding in one of these areas, such as your kidneys, adrenal glands, liver or thyroid. This finding may not be serious, but you may need more tests. Your doctor can help you decide what other tests you may need, if any.  What can I expect from the results?  About 1 out of 4 LDCT exams will find something that may need more tests. Most of the time, these findings are lung nodules. Lung nodules are very small collections of tissue in the lung. These nodules are very common, and the vast majority--more than 97 percent--are not cancer (benign). Most are normal lymph nodes or small areas of scarring from past infections.  But, if a small lung nodule is found to be cancer, the cancer can be cured more than 90 percent of the time. To know if the nodule is cancer, we may need to get more images before your next yearly screening exam. If the nodule has suspicious features (for example, it is large, has an odd shape or grows over time), we will refer you to a specialist for further testing.  Will my doctor also get the results?  Yes. Your doctor will get a copy of your results.  Is it okay to keep smoking now that there s a cancer screening exam?  No. Tobacco is one of the strongest cancer-causing agents. It causes not only lung cancer, but other cancers and cardiovascular (heart) diseases as well. The damage  caused by smoking builds over time. This means that the longer you smoke, the higher your risk of disease. While it is never too late to quit, the sooner you quit, the better.  Where can I find help to quit smoking?  The best way to prevent lung cancer is to stop smoking. If you have already quit smoking, congratulations and keep it up! For help on quitting smoking, please call [employer] at 9-892-QUITNOW ([phone]) or the American Cancer Society at [phone] to find local resources near you.  One-on-one health coaching:  If you d prefer to work individually with a health care provider on tobacco cessation, we offer:      Medication Therapy Management:  Our specially trained pharmacists work closely with you and your doctor to help you quit smoking.  Call [phone] or [phone] (toll free).

## 2022-05-05 NOTE — PROGRESS NOTES
Georgette Pereira's goals for this visit include: Has been struggling with her breathing. Few months. Sporadic chest pain twice.     She requests these members of her care team be copied on today's visit information: PCP    PCP: Yue Bermudez    Referring Provider:  No referring provider defined for this encounter.    /80 (BP Location: Left arm, Patient Position: Sitting, Cuff Size: Adult Regular)   Pulse 67   Wt 63.5 kg (139 lb 14.4 oz)   SpO2 96%   BMI 22.58 kg/m      Do you need any medication refills at today's visit? No.    Arnold Schmidt, EMT  Clinic Support  [facility]    [phone]    Employed by [facility]

## 2022-05-17 DIAGNOSIS — I10 ESSENTIAL HYPERTENSION WITH GOAL BLOOD PRESSURE LESS THAN 140/90: ICD-10-CM

## 2022-05-18 RX ORDER — METOPROLOL SUCCINATE 50 MG/1
TABLET, EXTENDED RELEASE ORAL
Qty: 90 TABLET | Refills: 3 | Status: SHIPPED | OUTPATIENT
Start: 2022-05-18 | End: 2023-03-29

## 2022-05-18 RX ORDER — LOSARTAN POTASSIUM AND HYDROCHLOROTHIAZIDE 25; 100 MG/1; MG/1
1 TABLET ORAL DAILY
Qty: 90 TABLET | Refills: 0 | Status: SHIPPED | OUTPATIENT
Start: 2022-05-18 | End: 2022-08-18

## 2022-05-18 NOTE — TELEPHONE ENCOUNTER
"Requested Prescriptions   Pending Prescriptions Disp Refills     losartan-hydrochlorothiazide (HYZAAR) 100-25 MG tablet [Pharmacy Med Name: LOSARTAN/HCTZ 100/25MG TABLETS] 90 tablet 1     Sig: TAKE 1 TABLET BY MOUTH DAILY       Angiotensin-II Receptors Failed - 5/17/2022  5:10 PM        Failed - Normal serum creatinine on file in past 12 months     Recent Labs   Lab Test 06/18/21  0819   CR 1.22*       Ok to refill medication if creatinine is low          Passed - Last blood pressure under 140/90 in past 12 months     BP Readings from Last 3 Encounters:   05/05/22 126/80   03/11/22 (!) 148/86   02/24/22 136/86                 Passed - Recent (12 mo) or future (30 days) visit within the authorizing provider's specialty     Patient has had an office visit with the authorizing provider or a provider within the authorizing providers department within the previous 12 mos or has a future within next 30 days. See \"Patient Info\" tab in inbasket, or \"Choose Columns\" in Meds & Orders section of the refill encounter.              Passed - Medication is active on med list        Passed - Patient is age 18 or older        Passed - No active pregnancy on record        Passed - Normal serum potassium on file in past 12 months     Recent Labs   Lab Test 06/18/21  0819   POTASSIUM 4.0                    Passed - No positive pregnancy test in past 12 months       Diuretics (Including Combos) Protocol Failed - 5/17/2022  5:10 PM        Failed - Normal serum creatinine on file in past 12 months     Recent Labs   Lab Test 06/18/21  0819   CR 1.22*              Passed - Blood pressure under 140/90 in past 12 months     BP Readings from Last 3 Encounters:   05/05/22 126/80   03/11/22 (!) 148/86   02/24/22 136/86                 Passed - Recent (12 mo) or future (30 days) visit within the authorizing provider's specialty     Patient has had an office visit with the authorizing provider or a provider within the authorizing providers " "department within the previous 12 mos or has a future within next 30 days. See \"Patient Info\" tab in inbasket, or \"Choose Columns\" in Meds & Orders section of the refill encounter.              Passed - Medication is active on med list        Passed - Patient is age 18 or older        Passed - No active pregancy on record        Passed - Normal serum potassium on file in past 12 months     Recent Labs   Lab Test 06/18/21  0819   POTASSIUM 4.0                    Passed - Normal serum sodium on file in past 12 months     Recent Labs   Lab Test 06/18/21  0819                 Passed - No positive pregnancy test in past 12 months         Signed Prescriptions Disp Refills    metoprolol succinate ER (TOPROL XL) 50 MG 24 hr tablet 90 tablet 3     Sig: TAKE 1 TABLET(50 MG) BY MOUTH DAILY       Beta-Blockers Protocol Passed - 5/17/2022  5:10 PM        Passed - Blood pressure under 140/90 in past 12 months     BP Readings from Last 3 Encounters:   05/05/22 126/80   03/11/22 (!) 148/86   02/24/22 136/86                 Passed - Patient is age 6 or older        Passed - Recent (12 mo) or future (30 days) visit within the authorizing provider's specialty     Patient has had an office visit with the authorizing provider or a provider within the authorizing providers department within the previous 12 mos or has a future within next 30 days. See \"Patient Info\" tab in inbasket, or \"Choose Columns\" in Meds & Orders section of the refill encounter.              Passed - Medication is active on med list           Routing refill request to provider for review/approval because:  Labs not current:  Casandra Melvin RN  [facility]           "

## 2022-07-05 DIAGNOSIS — F43.23 ADJUSTMENT DISORDER WITH MIXED ANXIETY AND DEPRESSED MOOD: ICD-10-CM

## 2022-07-05 NOTE — TELEPHONE ENCOUNTER
Routing refill request to provider for review/approval because:  Drug not on the G refill protocol     Requested Prescriptions   Pending Prescriptions Disp Refills    diazepam (VALIUM) 10 MG tablet [Pharmacy Med Name: DIAZEPAM 10MG TABLETS] 10 tablet      Sig: TAKE 1/2 TO 1 TABLET BY MOUTH DAILY AS NEEDED FOR ANXIETY        There is no refill protocol information for this order            Francine Rich RN   [facility] [place] [facility]

## 2022-07-06 RX ORDER — DIAZEPAM 10 MG
TABLET ORAL
Qty: 10 TABLET | Refills: 1 | Status: SHIPPED | OUTPATIENT
Start: 2022-07-06 | End: 2022-09-28

## 2022-07-20 ENCOUNTER — VIRTUAL VISIT (OUTPATIENT)
Dept: NEUROLOGY | Facility: CLINIC | Age: 62
End: 2022-07-20
Payer: COMMERCIAL

## 2022-07-20 DIAGNOSIS — I66.01 MIDDLE CEREBRAL ARTERY STENOSIS, RIGHT: Primary | ICD-10-CM

## 2022-07-20 PROCEDURE — 99214 OFFICE O/P EST MOD 30 MIN: CPT | Mod: 95 | Performed by: PSYCHIATRY & NEUROLOGY

## 2022-07-20 NOTE — PROGRESS NOTES
Amie is a 61 year old who is being evaluated via a billable video visit.        How would you like to obtain your AVS? MyChart  If the video visit is dropped, the invitation should be resent by: Send to e-mail at: [email or web address]  Will anyone else be joining your video visit? No    Video-Visit Details    Video Start Time: 11:42 AM    Type of service:  Video Visit    Video End Time:11:53 AM    Originating Location (pt. Location): Home    Distant Location (provider location):  [facility]     Platform used for Video Visit: [facility]    Chief Complaint   Patient presents with     RECHECK                34 minutes spent on the date of the encounter doing chart review, review of test results, interpretation of tests, patient visit and documentation            No follow-ups on file.    Calvin Murphy MD  [facility]    _________________________________________________    [facility]    at [facility] and Surgery [facility]  _____________________________________________________________      Chief Complaint: Patient presents with:  RECHECK      History of Present Illness: Georgette Pereira is a 60 year old female presenting as a new patient for stroke.     She is  R handed female with past medical hx of COPD , Emphysema, HTN, HLD and fibromyalgia for evaluation of headache and vision changes along with abnormal MRI. According to the patient she started having headache 6 months ago. She describes it as stabbing and pressure like sensation in the middle of her head without any associated migrainous symptoms or other focal neurological symptoms as well. This headache was constant and would get better for 1 hour after taking aspirin . She also reported an interval of time where she noticed bilateral shoulder weakness and heaviness to the point that it was difficult for her to lift her arms above her head  that lasted for about 2 months. About 2 months ago she noticed episode of L eye vision loss which happened a total of 5 times lasting from 5 minutes to 1.5 hours. Majority were around 1 hour long. The vision change will start with a sensation of scratching in her L eye which will be followed by whitening of her vision . It happened about 5 time same presentation and in the same eye. She is confident that it was the L eye that was a problem rather than visual field issue. This last for about 2 months. Currently her headache and vision changes are resolved. She reports that she was not taking her BP medication during this period of time as she ran out of her insurance and her BP were high. The highest it has been was 230. She was seen by PCP in June and reports that at that time she was started on anti hypertensive medications.     She denies any other hx of TIA or stroke like symptoms. For her work up MRI was ordered which showed R semi ovale stroke ?watershed. MRA head showed R MCA and L PCA stenosis. US carotid did not show high grade carotid stenosis. She had LDL of 88.     She was seen in clinic for headache and abnormal MRI showing acute infarct with intracranial stenosis. Follow up CTA showed similar findings. No extracranial carotid disease. She reports her headache and vision changes has resolved since then. She started noticing L scalp tenderness starting 3 days ago. She denies any vision changes associated with it. She reports muscle ache but no night sweates or fevers. She reports compliance with her medications without any side effects. She denies any new episodes in the interim.     Impression:   Problem List Items Addressed This Visit    None           Plan:     Aspirin  325 mg aspirin daily    Crestor at 10 mg daily    Echo test is unremarkable    Lab work for LDL and HbA1c to be completed today    30 day heart monitor is neg for Afib    Smoking cessation    LDL goal is <70        Stroke Education  "provided.  She will call us with any questions.  For any acute neurologic deficits she was advised to  go directly to the hospital rather than call the clinic.    Calvin Murphy MD  Neurology  07/20/2022 11:44 AM  To page me or covering stroke neurology team member, click here: AMCOM  Choose \"On Call\" tab at top, then search dropdown box for \"Neurology Adult\" & press Enter, look for Neuro ICU/Stroke    ___________________________________________________________________    Current Medications  Current Outpatient Medications   Medication Sig     albuterol (PROAIR HFA/PROVENTIL HFA/VENTOLIN HFA) 108 (90 Base) MCG/ACT inhaler Inhale 2 puffs into the lungs every 6 hours as needed for shortness of breath / dyspnea or wheezing     albuterol (PROVENTIL) (2.5 MG/3ML) 0.083% neb solution Take 1 vial (2.5 mg) by nebulization 4 times daily     aspirin (ASA) 325 MG EC tablet Take 325 mg by mouth every 6 hours as needed     clobetasol (TEMOVATE) 0.05 % external ointment Apply topically 2 times daily     diazepam (VALIUM) 10 MG tablet TAKE 1/2 TO 1 TABLET BY MOUTH DAILY AS NEEDED FOR ANXIETY     DULERA 200-5 MCG/ACT inhaler INHALE 2 PUFFS INTO THE LUNGS TWICE DAILY     DULoxetine (CYMBALTA) 30 MG capsule TAKE 1 CAPSULE BY MOUTH 2 TIMES A DAY     estradiol (VAGIFEM) 10 MCG TABS vaginal tablet Place 1 tablet (10 mcg) vaginally twice a week     lidocaine (LIDODERM) 5 % patch Apply up to 3 patches to painful area at once for up to 12 h within a 24 h period.  Remove after 12 hours.     losartan-hydrochlorothiazide (HYZAAR) 100-25 MG tablet Take 1 tablet by mouth daily +++LABS OVERDUE, PLEASE SCHEDULE LAB APPOINTMENT++++     metoprolol succinate ER (TOPROL XL) 50 MG 24 hr tablet TAKE 1 TABLET(50 MG) BY MOUTH DAILY     nicotine (NICODERM CQ) 21 MG/24HR 24 hr patch Place 1 patch onto the skin every 24 hours     PEG 3350-KCl-NaBcb-NaCl-NaSulf (PEG-3350/ELECTROLYTES) 236 g SOLR TAKE 8000 ML BY MOUTH 1 TIME FOR 1 DOSE. FOLLOW PREP " INSTRUCTIONS SENT VIA MY CHART     rosuvastatin (CRESTOR) 10 MG tablet Take 2 tablets (20 mg) by mouth daily     tiotropium (SPIRIVA) 18 MCG inhaled capsule Inhale 1 capsule (18 mcg) into the lungs daily     triamcinolone (KENALOG) 0.1 % external cream APPLY TOPICALLY TO THE AFFECTED AREA TWICE DAILY     No current facility-administered medications for this visit.       Past Medical History  Past Medical History:   Diagnosis Date     ACL (anterior cruciate ligament) tear 2012    Left      Chronic obstructive pulmonary disease with acute exacerbation (H) 2014    First hospitalization 14      CKD (chronic kidney disease) stage 3, GFR 30-59 ml/min (H) 2020     CTS (carpal tunnel syndrome) 2010     Degenerative joint disease      Temporomandibular joint disorders, unspecified        Social History  Social History     Tobacco Use     Smoking status: Former Smoker     Packs/day: 1.00     Years: 32.00     Pack years: 32.00     Types: Cigarettes, Other     Quit date: 2012     Years since quittin.5     Smokeless tobacco: Never Used     Tobacco comment: using nicotine replacement: e cigarattes   Vaping Use     Vaping Use: Never used   Substance Use Topics     Alcohol use: No     Alcohol/week: 0.0 standard drinks     Drug use: No       Family History  Family History   Problem Relation Age of Onset     Breast Cancer Maternal Grandmother      Coronary Artery Disease Maternal Grandmother         MI at 41     Cerebrovascular Disease Mother      Hypertension Mother      Heart Disease Mother      Cancer Mother         Lung     Substance Abuse Mother      Cerebrovascular Disease Father      Hypertension Father      Cancer Father         Metastatic, primary lung/Prostate     Substance Abuse Father      Cancer Other      Substance Abuse Brother      Depression Son      Schizophrenia Brother      Bipolar Disorder Brother      Substance Abuse Brother      Aneurysm Paternal Uncle         Brain Aneurysms        ROS: 10 point relevant ROS neg other than the symptoms noted above in the HPI.    Physical Exam    There were no vitals taken for this visit.    Neurologic  Mental Status:  alert, oriented x 3, follows commands, speech clear and fluent, naming and repetition normal  Cranial Nerves:  EOMI with normal smooth pursuit, facial movements symmetric, hearing not formally tested but intact to conversation, no dysarthria, shoulder shrug equal bilaterally, tongue protrusion midline  Motor:  no abnormal movements, able to move all limbs antigravity spontaneously with no signs of hemiparesis observed, no pronator drift  Reflexes:  Deferred due to telestroke  Sensory:  Deferred  Coordination: Deferred due to telestroke  Station/Gait:  Deferred    Neuroimaging: as per HPI. I personally reviewed those images and it shows R semioval stroke likely 14days old. MRA with RMCA and LPCA stenosis.    Labs:    No lab results found.     Recent Labs   Lab Test 01/21/22  1156 06/15/21  0824   CHOL 178 191   HDL 49* 55   LDL 95 88   TRIG 170* 240*       Recent Labs   Lab Test 01/21/22  1156   A1C 5.8*       No lab results found.

## 2022-07-20 NOTE — LETTER
7/20/2022       RE: Georgette Pereira  [id number]  [place] [address]     Dear Colleague,    Thank you for referring your patient, Georgette Pereira, to the [facility] NEUROLOGY CLINIC [place] at [facility]. Please see a copy of my visit note below.    _________________________________________________    [facility]    at [facility] and Surgery [facility]  _____________________________________________________________      Chief Complaint: Patient presents with:  RECHECK      History of Present Illness: Georgette Pereira is a 60 year old female presenting as a new patient for stroke.     She is  R handed female with past medical hx of COPD , Emphysema, HTN, HLD and fibromyalgia for evaluation of headache and vision changes along with abnormal MRI. According to the patient she started having headache 6 months ago. She describes it as stabbing and pressure like sensation in the middle of her head without any associated migrainous symptoms or other focal neurological symptoms as well. This headache was constant and would get better for 1 hour after taking aspirin . She also reported an interval of time where she noticed bilateral shoulder weakness and heaviness to the point that it was difficult for her to lift her arms above her head that lasted for about 2 months. About 2 months ago she noticed episode of L eye vision loss which happened a total of 5 times lasting from 5 minutes to 1.5 hours. Majority were around 1 hour long. The vision change will start with a sensation of scratching in her L eye which will be followed by whitening of her vision . It happened about 5 time same presentation and in the same eye. She is confident that it was the L eye that was a problem rather than visual field issue. This last for about 2 months. Currently her headache and vision changes are resolved.  "She reports that she was not taking her BP medication during this period of time as she ran out of her insurance and her BP were high. The highest it has been was 230. She was seen by PCP in June and reports that at that time she was started on anti hypertensive medications.     She denies any other hx of TIA or stroke like symptoms. For her work up MRI was ordered which showed R semi ovale stroke ?watershed. MRA head showed R MCA and L PCA stenosis. US carotid did not show high grade carotid stenosis. She had LDL of 88.     She was seen in clinic for headache and abnormal MRI showing acute infarct with intracranial stenosis. Follow up CTA showed similar findings. No extracranial carotid disease. She reports her headache and vision changes has resolved since then. She started noticing L scalp tenderness starting 3 days ago. She denies any vision changes associated with it. She reports muscle ache but no night sweates or fevers. She reports compliance with her medications without any side effects. She denies any new episodes in the interim.     Impression:   Problem List Items Addressed This Visit    None           Plan:     Aspirin  325 mg aspirin daily    Crestor at 10 mg daily    Echo test is unremarkable    Lab work for LDL and HbA1c to be completed today    30 day heart monitor is neg for Afib    Smoking cessation    LDL goal is <70        Stroke Education provided.  She will call us with any questions.  For any acute neurologic deficits she was advised to  go directly to the hospital rather than call the clinic.    Calvin Muprhy MD  Neurology  07/20/2022 11:44 AM  To page me or covering stroke neurology team member, click here: AMCOM  Choose \"On Call\" tab at top, then search dropdown box for \"Neurology Adult\" & press Enter, look for Neuro ICU/Stroke    ___________________________________________________________________    Current Medications  Current Outpatient Medications   Medication Sig     albuterol " (PROAIR HFA/PROVENTIL HFA/VENTOLIN HFA) 108 (90 Base) MCG/ACT inhaler Inhale 2 puffs into the lungs every 6 hours as needed for shortness of breath / dyspnea or wheezing     albuterol (PROVENTIL) (2.5 MG/3ML) 0.083% neb solution Take 1 vial (2.5 mg) by nebulization 4 times daily     aspirin (ASA) 325 MG EC tablet Take 325 mg by mouth every 6 hours as needed     clobetasol (TEMOVATE) 0.05 % external ointment Apply topically 2 times daily     diazepam (VALIUM) 10 MG tablet TAKE 1/2 TO 1 TABLET BY MOUTH DAILY AS NEEDED FOR ANXIETY     DULERA 200-5 MCG/ACT inhaler INHALE 2 PUFFS INTO THE LUNGS TWICE DAILY     DULoxetine (CYMBALTA) 30 MG capsule TAKE 1 CAPSULE BY MOUTH 2 TIMES A DAY     estradiol (VAGIFEM) 10 MCG TABS vaginal tablet Place 1 tablet (10 mcg) vaginally twice a week     lidocaine (LIDODERM) 5 % patch Apply up to 3 patches to painful area at once for up to 12 h within a 24 h period.  Remove after 12 hours.     losartan-hydrochlorothiazide (HYZAAR) 100-25 MG tablet Take 1 tablet by mouth daily +++LABS OVERDUE, PLEASE SCHEDULE LAB APPOINTMENT++++     metoprolol succinate ER (TOPROL XL) 50 MG 24 hr tablet TAKE 1 TABLET(50 MG) BY MOUTH DAILY     nicotine (NICODERM CQ) 21 MG/24HR 24 hr patch Place 1 patch onto the skin every 24 hours     PEG 3350-KCl-NaBcb-NaCl-NaSulf (PEG-3350/ELECTROLYTES) 236 g SOLR TAKE 8000 ML BY MOUTH 1 TIME FOR 1 DOSE. FOLLOW PREP INSTRUCTIONS SENT VIA MY CHART     rosuvastatin (CRESTOR) 10 MG tablet Take 2 tablets (20 mg) by mouth daily     tiotropium (SPIRIVA) 18 MCG inhaled capsule Inhale 1 capsule (18 mcg) into the lungs daily     triamcinolone (KENALOG) 0.1 % external cream APPLY TOPICALLY TO THE AFFECTED AREA TWICE DAILY     No current facility-administered medications for this visit.       Past Medical History  Past Medical History:   Diagnosis Date     ACL (anterior cruciate ligament) tear 2/27/2012    Left      Chronic obstructive pulmonary disease with acute exacerbation (H)  2014    First hospitalization 14      CKD (chronic kidney disease) stage 3, GFR 30-59 ml/min (H) 2020     CTS (carpal tunnel syndrome) 2010     Degenerative joint disease      Temporomandibular joint disorders, unspecified        Social History  Social History     Tobacco Use     Smoking status: Former Smoker     Packs/day: 1.00     Years: 32.00     Pack years: 32.00     Types: Cigarettes, Other     Quit date: 2012     Years since quittin.5     Smokeless tobacco: Never Used     Tobacco comment: using nicotine replacement: e cigarattes   Vaping Use     Vaping Use: Never used   Substance Use Topics     Alcohol use: No     Alcohol/week: 0.0 standard drinks     Drug use: No       Family History  Family History   Problem Relation Age of Onset     Breast Cancer Maternal Grandmother      Coronary Artery Disease Maternal Grandmother         MI at 41     Cerebrovascular Disease Mother      Hypertension Mother      Heart Disease Mother      Cancer Mother         Lung     Substance Abuse Mother      Cerebrovascular Disease Father      Hypertension Father      Cancer Father         Metastatic, primary lung/Prostate     Substance Abuse Father      Cancer Other      Substance Abuse Brother      Depression Son      Schizophrenia Brother      Bipolar Disorder Brother      Substance Abuse Brother      Aneurysm Paternal Uncle         Brain Aneurysms       ROS: 10 point relevant ROS neg other than the symptoms noted above in the HPI.    Physical Exam    There were no vitals taken for this visit.    Neurologic  Mental Status:  alert, oriented x 3, follows commands, speech clear and fluent, naming and repetition normal  Cranial Nerves:  EOMI with normal smooth pursuit, facial movements symmetric, hearing not formally tested but intact to conversation, no dysarthria, shoulder shrug equal bilaterally, tongue protrusion midline  Motor:  no abnormal movements, able to move all limbs antigravity spontaneously  with no signs of hemiparesis observed, no pronator drift  Reflexes:  Deferred due to telestroke  Sensory:  Deferred  Coordination: Deferred due to telestroke  Station/Gait:  Deferred    Neuroimaging: as per HPI. I personally reviewed those images and it shows R semioval stroke likely 14days old. MRA with RMCA and LPCA stenosis.    Labs:    No lab results found.     Recent Labs   Lab Test 01/21/22  1156 06/15/21  0824   CHOL 178 191   HDL 49* 55   LDL 95 88   TRIG 170* 240*       Recent Labs   Lab Test 01/21/22  1156   A1C 5.8*       No lab results found.        Sincerely,    Calvin Murphy MD

## 2022-07-20 NOTE — PROGRESS NOTES
Amie is a 61 year old who is being evaluated via a billable video visit.        How would you like to obtain your AVS? MyChart  If the video visit is dropped, the invitation should be resent by: Send to e-mail at: [email or web address]  Will anyone else be joining your video visit? No    Video-Visit Details    Video Start Time: 11:42 AM    Type of service:  Video Visit    Video End Time:11:53 AM    Originating Location (pt. Location): Home    Distant Location (provider location):  [facility] NEUROLOGY CLINIC [place]     Platform used for Video Visit: [facility]    Chief Complaint   Patient presents with     RACHNA Kirkland

## 2022-07-27 ENCOUNTER — TELEPHONE (OUTPATIENT)
Dept: FAMILY MEDICINE | Facility: CLINIC | Age: 62
End: 2022-07-27

## 2022-07-30 ENCOUNTER — HEALTH MAINTENANCE LETTER (OUTPATIENT)
Age: 62
End: 2022-07-30

## 2022-08-03 ENCOUNTER — TELEPHONE (OUTPATIENT)
Dept: INTERNAL MEDICINE | Facility: CLINIC | Age: 62
End: 2022-08-03

## 2022-08-03 NOTE — TELEPHONE ENCOUNTER
PA is needed for the medication, Duloxetine 30mg. Would you like to start PA or Rx alternative medication? If PA, please list all medications this patient has tried along with any contraindications that may have been experienced in the past. Thank you.    Pharmacy: [facility]  Phone: [phone]    Insurance Plan: [facility]  Phone:   Pt ID: [id number]   Group:     Forwarded to SAM ZAFAR  for review.

## 2022-08-03 NOTE — TELEPHONE ENCOUNTER
Central Prior Authorization Team   Phone: [phone]      PA Initiation    Medication: Duloxetine 30mg  Insurance Company: [employer] - Phone [phone] Fax [phone]  Pharmacy Filling the Rx: [id number] - [place] - [address]  Filling Pharmacy Phone: [phone]  Filling Pharmacy Fax:    Start Date: 8/3/2022

## 2022-08-03 NOTE — TELEPHONE ENCOUNTER
Prior Authorization Approval    Authorization Effective Date: 8/2/2022  Authorization Expiration Date: 8/3/2023  Medication: Duloxetine 30mg-APPROVED  Approved Dose/Quantity:   Reference #:     Insurance Company: [employer] - Phone [phone] Fax [phone]  Expected CoPay:       CoPay Card Available:      Foundation Assistance Needed:    Which Pharmacy is filling the prescription (Not needed for infusion/clinic administered): [id number] - [place] - [address]  Pharmacy Notified: Yes  Patient Notified: No    **Instructed pharmacy to notify patient when script is ready to /ship.**

## 2022-08-18 DIAGNOSIS — I10 ESSENTIAL HYPERTENSION WITH GOAL BLOOD PRESSURE LESS THAN 140/90: ICD-10-CM

## 2022-08-18 RX ORDER — LOSARTAN POTASSIUM AND HYDROCHLOROTHIAZIDE 25; 100 MG/1; MG/1
1 TABLET ORAL DAILY
Qty: 60 TABLET | Refills: 0 | Status: SHIPPED | OUTPATIENT
Start: 2022-08-18 | End: 2023-03-15

## 2022-09-15 ENCOUNTER — MYC MEDICAL ADVICE (OUTPATIENT)
Dept: INTERNAL MEDICINE | Facility: CLINIC | Age: 62
End: 2022-09-15

## 2022-09-27 DIAGNOSIS — F43.23 ADJUSTMENT DISORDER WITH MIXED ANXIETY AND DEPRESSED MOOD: ICD-10-CM

## 2022-09-28 RX ORDER — DIAZEPAM 10 MG
TABLET ORAL
Qty: 10 TABLET | Refills: 1 | Status: SHIPPED | OUTPATIENT
Start: 2022-09-28 | End: 2022-12-01

## 2022-10-05 NOTE — TELEPHONE ENCOUNTER
Physician Pre-Sedation Assessment    Pre-Sedation Assessment:    Sedation History: Previous Sedation with No Complications and Airway Assessed    Cardiac: normal S1, S2  Respiratory: breath sounds clear bilaterally   Abdomen: soft, BS (+), non-tender    ASA Classification: 3.  Patient with severe systemic disease    Plan: mac Sedation Reason for Call:  Other Heart monitor    Detailed comments: Patient would like you to request the heart monitor to be picked up at the [place] location. Once it has arrived please call patient to pick it up    Phone Number Patient can be reached at: Home number on file [phone] (home)    Best Time: any    Can we leave a detailed message on this number? NO    Call taken on 8/4/2021 at 1:44 PM by Leora Kruger

## 2022-10-09 ENCOUNTER — HEALTH MAINTENANCE LETTER (OUTPATIENT)
Age: 62
End: 2022-10-09

## 2022-11-08 ENCOUNTER — TELEPHONE (OUTPATIENT)
Dept: PULMONOLOGY | Facility: CLINIC | Age: 62
End: 2022-11-08

## 2022-11-08 NOTE — TELEPHONE ENCOUNTER
Left Voicemail (1st Attempt) for the patient to call back and schedule the following:    Appointment type: return   Provider: dr. quezada  Return date: 5/2/2023   Specialty phone number: [phone]   Additonal Notes: Return in about 1 year (around 5/5/2023) for Follow up.    [facility]   Orthopedics, Podiatry, Sports Medicine, Ent ,Eye , Audiology, Adult Endocrine & Diabetes, Nutrition & Medication Therapy Management Specialties   [facility]

## 2022-11-14 NOTE — TELEPHONE ENCOUNTER
Left Voicemail (2nd Attempt) for the patient to call back and schedule the following:    Appointment type: Return Pulm  Provider: Dr. Reyes  Return date: 5/2/2023  Specialty phone number: [phone]  Additional appointment(s) needed: none  Additonal Notes: patient needs to schedule a 1 year follow up with Dr. Amy Ring R/Procedure    [facility]   Neurology, NeuroSurgery, NeuroPsychology and Pain Management Specialties  Medical/Surgical Adult Specialties

## 2022-12-01 DIAGNOSIS — F43.23 ADJUSTMENT DISORDER WITH MIXED ANXIETY AND DEPRESSED MOOD: ICD-10-CM

## 2022-12-01 RX ORDER — DIAZEPAM 10 MG
TABLET ORAL
Qty: 10 TABLET | Refills: 1 | Status: SHIPPED | OUTPATIENT
Start: 2022-12-01 | End: 2023-01-17

## 2022-12-05 ENCOUNTER — E-VISIT (OUTPATIENT)
Dept: FAMILY MEDICINE | Facility: CLINIC | Age: 62
End: 2022-12-05
Payer: COMMERCIAL

## 2022-12-05 ENCOUNTER — TELEPHONE (OUTPATIENT)
Dept: FAMILY MEDICINE | Facility: CLINIC | Age: 62
End: 2022-12-05

## 2022-12-05 DIAGNOSIS — R55 VASOVAGAL SYNCOPE: Primary | ICD-10-CM

## 2022-12-05 PROCEDURE — 99207 PR NON-BILLABLE SERV PER CHARTING: CPT | Performed by: INTERNAL MEDICINE

## 2022-12-05 NOTE — TELEPHONE ENCOUNTER
Call patient:  She sent me e-visit with syncopal symptoms      Needs to go to ER.  Ensure understanding.   Thank you

## 2022-12-05 NOTE — TELEPHONE ENCOUNTER
Called patient and notified her of message from Dr. Bermudez. Pt verbalized and agreement with recommendations. Will probably go to [facility]. States that she is familiar with [facility]. Notified her that he closest ERs are [facility] or [facility].     Ciera Hightower RN   [facility]

## 2023-01-17 DIAGNOSIS — F43.23 ADJUSTMENT DISORDER WITH MIXED ANXIETY AND DEPRESSED MOOD: ICD-10-CM

## 2023-01-17 RX ORDER — DIAZEPAM 10 MG
TABLET ORAL
Qty: 10 TABLET | Refills: 1 | Status: SHIPPED | OUTPATIENT
Start: 2023-01-17 | End: 2023-03-27

## 2023-01-26 DIAGNOSIS — R91.8 PULMONARY NODULES: ICD-10-CM

## 2023-01-26 DIAGNOSIS — J44.9 COPD, MODERATE (H): Chronic | ICD-10-CM

## 2023-01-26 RX ORDER — ALBUTEROL SULFATE 0.83 MG/ML
2.5 SOLUTION RESPIRATORY (INHALATION) 4 TIMES DAILY
Qty: 100 ML | Refills: 4 | Status: SHIPPED | OUTPATIENT
Start: 2023-01-26 | End: 2023-07-31

## 2023-01-26 NOTE — TELEPHONE ENCOUNTER
Requested Prescriptions   Pending Prescriptions Disp Refills     albuterol (PROVENTIL) (2.5 MG/3ML) 0.083% neb solution 100 mL 4     Sig: Take 1 vial (2.5 mg) by nebulization 4 times daily       There is no refill protocol information for this order        Last Written Prescription Date:  7/19/2021  Last Fill Quantity: 100 mL,  # refills: 4  Last office visit: Visit date not found with prescribing provider:   Future Office Visit:  N/A

## 2023-03-15 DIAGNOSIS — I10 ESSENTIAL HYPERTENSION WITH GOAL BLOOD PRESSURE LESS THAN 140/90: ICD-10-CM

## 2023-03-15 RX ORDER — LOSARTAN POTASSIUM AND HYDROCHLOROTHIAZIDE 25; 100 MG/1; MG/1
TABLET ORAL
Qty: 90 TABLET | Refills: 1 | Status: SHIPPED | OUTPATIENT
Start: 2023-03-15 | End: 2023-09-21

## 2023-03-27 DIAGNOSIS — F43.23 ADJUSTMENT DISORDER WITH MIXED ANXIETY AND DEPRESSED MOOD: ICD-10-CM

## 2023-03-27 RX ORDER — DIAZEPAM 10 MG
TABLET ORAL
Qty: 10 TABLET | Refills: 1 | Status: SHIPPED | OUTPATIENT
Start: 2023-03-27 | End: 2023-05-04

## 2023-03-29 ENCOUNTER — MYC MEDICAL ADVICE (OUTPATIENT)
Dept: NEUROLOGY | Facility: CLINIC | Age: 63
End: 2023-03-29
Payer: COMMERCIAL

## 2023-03-29 DIAGNOSIS — I10 ESSENTIAL HYPERTENSION WITH GOAL BLOOD PRESSURE LESS THAN 140/90: ICD-10-CM

## 2023-03-29 RX ORDER — METOPROLOL SUCCINATE 50 MG/1
TABLET, EXTENDED RELEASE ORAL
Qty: 90 TABLET | Refills: 3 | Status: SHIPPED | OUTPATIENT
Start: 2023-03-29 | End: 2024-03-01

## 2023-03-31 ENCOUNTER — OFFICE VISIT (OUTPATIENT)
Dept: FAMILY MEDICINE | Facility: CLINIC | Age: 63
End: 2023-03-31
Payer: COMMERCIAL

## 2023-03-31 VITALS
TEMPERATURE: 97.5 F | HEIGHT: 66 IN | HEART RATE: 87 BPM | OXYGEN SATURATION: 98 % | DIASTOLIC BLOOD PRESSURE: 87 MMHG | BODY MASS INDEX: 19.93 KG/M2 | SYSTOLIC BLOOD PRESSURE: 130 MMHG | RESPIRATION RATE: 32 BRPM | WEIGHT: 124 LBS

## 2023-03-31 DIAGNOSIS — E78.5 HYPERLIPIDEMIA LDL GOAL <130: ICD-10-CM

## 2023-03-31 DIAGNOSIS — Z71.6 ENCOUNTER FOR SMOKING CESSATION COUNSELING: ICD-10-CM

## 2023-03-31 DIAGNOSIS — J41.1 MUCOPURULENT CHRONIC BRONCHITIS (H): ICD-10-CM

## 2023-03-31 DIAGNOSIS — N18.31 CHRONIC KIDNEY DISEASE, STAGE 3A (H): ICD-10-CM

## 2023-03-31 DIAGNOSIS — E78.5 HYPERLIPIDEMIA LDL GOAL <100: Primary | ICD-10-CM

## 2023-03-31 PROCEDURE — 99214 OFFICE O/P EST MOD 30 MIN: CPT | Performed by: INTERNAL MEDICINE

## 2023-03-31 RX ORDER — NICOTINE 21 MG/24HR
1 PATCH, TRANSDERMAL 24 HOURS TRANSDERMAL EVERY 24 HOURS
Qty: 90 PATCH | Refills: 4 | Status: SHIPPED | OUTPATIENT
Start: 2023-03-31 | End: 2023-10-19

## 2023-03-31 RX ORDER — ROSUVASTATIN CALCIUM 20 MG/1
20 TABLET, COATED ORAL DAILY
Qty: 90 TABLET | Refills: 4 | Status: SHIPPED | OUTPATIENT
Start: 2023-03-31 | End: 2024-03-01

## 2023-03-31 RX ORDER — DOXYCYCLINE 100 MG/1
100 CAPSULE ORAL 2 TIMES DAILY
Qty: 20 CAPSULE | Refills: 0 | Status: SHIPPED | OUTPATIENT
Start: 2023-03-31 | End: 2023-04-10

## 2023-03-31 RX ORDER — PREDNISONE 20 MG/1
TABLET ORAL
Qty: 20 TABLET | Refills: 0 | Status: SHIPPED | OUTPATIENT
Start: 2023-03-31 | End: 2023-04-20

## 2023-03-31 ASSESSMENT — PATIENT HEALTH QUESTIONNAIRE - PHQ9
SUM OF ALL RESPONSES TO PHQ QUESTIONS 1-9: 4
10. IF YOU CHECKED OFF ANY PROBLEMS, HOW DIFFICULT HAVE THESE PROBLEMS MADE IT FOR YOU TO DO YOUR WORK, TAKE CARE OF THINGS AT HOME, OR GET ALONG WITH OTHER PEOPLE: NOT DIFFICULT AT ALL
SUM OF ALL RESPONSES TO PHQ QUESTIONS 1-9: 4

## 2023-03-31 NOTE — PROGRESS NOTES
Assessment & Plan   Problem List Items Addressed This Visit     Hyperlipidemia LDL goal <130 (Chronic)    Relevant Medications    rosuvastatin (CRESTOR) 20 MG tablet    Chronic kidney disease, stage 3a (H)   Other Visit Diagnoses     Hyperlipidemia LDL goal <100    -  Primary    Relevant Medications    rosuvastatin (CRESTOR) 20 MG tablet    Mucopurulent chronic bronchitis (H)        Relevant Medications    predniSONE (DELTASONE) 20 MG tablet    doxycycline hyclate (VIBRAMYCIN) 100 MG capsule    Encounter for smoking cessation counseling        Relevant Medications    nicotine (NICODERM CQ) 21 MG/24HR 24 hr patch         Reviewed previous stress test - if no response will need stress evaluation and additional work up and would benefot from ongoing pulmonary assessments  Reviewed some basic information on COPD     Is working on reducing vaping        MED REC REQUIRED  Post Medication Reconciliation Status:       Work on weight loss  Regular exercise    David Baeza MD  [facility] YVETTE Holloway is a 62 year old, presenting for the following health issues:  Hospital F/U  No flowsheet data found.  [facility]       Hospital Follow-up Visit:    Hospital/Nursing Home/IP Rehab Facility: [facility]  Date of Admission: 3/1/23  Date of Discharge: 3/4/23  Reason(s) for Admission: COPD Exacerbation    Was your hospitalization related to COVID-19? No   Problems taking medications regularly:  None  Medication changes since discharge: None  Problems adhering to non-medication therapy:  None    Patches vaping .     Heavy chest and coming on   Both times in the hospital   Prednisone ( needed)   7 weeks of headache back neck frontal area   No sinus drainage           Summary of hospitalization:  [facility] discharge summary reviewed  Diagnostic Tests/Treatments reviewed.  Follow up needed: none  Other Healthcare Providers Involved in Patient s Care:         None  Update since discharge:  "improved.         Plan of care communicated with patient                 Review of Systems   Constitutional, HEENT, cardiovascular, pulmonary, gi and gu systems are negative, except as otherwise noted.      Objective    /87 (BP Location: Right arm, Patient Position: Chair, Cuff Size: Adult Regular)   Pulse 87   Temp 97.5  F (36.4  C)   Resp (!) 32   Ht 1.676 m (5' 6\")   Wt 56.2 kg (124 lb)   SpO2 98%   BMI 20.01 kg/m    Body mass index is 20.01 kg/m .  Physical Exam   GENERAL: healthy, alert and no distress  EYES: Eyes grossly normal to inspection, PERRL and conjunctivae and sclerae normal  HENT: ear canals and TM's normal, nose and mouth without ulcers or lesions  NECK: no adenopathy, no asymmetry, masses, or scars and thyroid normal to palpation  RESP: lungs clear to auscultation - no rales, rhonchi or wheezes  CV: regular rate and rhythm, normal S1 S2, no S3 or S4, no murmur, click or rub, no peripheral edema and peripheral pulses strong  ABDOMEN: soft, nontender, no hepatosplenomegaly, no masses and bowel sounds normal  MS: no gross musculoskeletal defects noted, no edema  SKIN: no suspicious lesions or rashes  NEURO: Normal strength and tone, mentation intact and speech normal  BACK: no CVA tenderness, no paralumbar tenderness  PSYCH: mentation appears normal, affect normal/bright    No results found for any visits on 03/31/23.                Answers for HPI/ROS submitted by the patient on 3/31/2023  If you checked off any problems, how difficult have these problems made it for you to do your work, take care of things at home, or get along with other people?: Not difficult at all  PHQ9 TOTAL SCORE: 4      "

## 2023-03-31 NOTE — TELEPHONE ENCOUNTER
Spoke with patient. Saw primary care provider today. Thought patient may have a sinus infection - prescribed steroids and antibiotic. No s/s stroke. Patient will continue to follow with primary care provider for headaches. Patient verbalized understanding. Madeline Middleton RN 3/31/2023 12:50 PM

## 2023-04-20 ENCOUNTER — VIRTUAL VISIT (OUTPATIENT)
Dept: INTERNAL MEDICINE | Facility: CLINIC | Age: 63
End: 2023-04-20
Payer: COMMERCIAL

## 2023-04-20 DIAGNOSIS — Z86.73 HISTORY OF CVA (CEREBROVASCULAR ACCIDENT) WITHOUT RESIDUAL DEFICITS: ICD-10-CM

## 2023-04-20 DIAGNOSIS — G44.52 NEW DAILY PERSISTENT HEADACHE: Primary | ICD-10-CM

## 2023-04-20 DIAGNOSIS — I73.9 PAD (PERIPHERAL ARTERY DISEASE) (H): ICD-10-CM

## 2023-04-20 DIAGNOSIS — J44.9 COPD, MODERATE (H): Chronic | ICD-10-CM

## 2023-04-20 DIAGNOSIS — F33.1 MODERATE RECURRENT MAJOR DEPRESSION (H): ICD-10-CM

## 2023-04-20 PROCEDURE — 99214 OFFICE O/P EST MOD 30 MIN: CPT | Mod: VID | Performed by: INTERNAL MEDICINE

## 2023-04-20 RX ORDER — PREDNISONE 10 MG/1
TABLET ORAL
Qty: 90 TABLET | Refills: 1 | Status: SHIPPED | OUTPATIENT
Start: 2023-04-20 | End: 2023-05-15

## 2023-04-20 NOTE — PROGRESS NOTES
"Amie is a 62 year old who is being evaluated via a billable video visit.      How would you like to obtain your AVS? MyChart  If the video visit is dropped, the invitation should be resent by: Text to cell phone: [phone]  Will anyone else be joining your video visit? No          Assessment & Plan     New daily persistent headache  H/o CVA  8 weeks.   Vertigo symptoms.  Awakening her at night.   >>> consider Rebound:  Stop ibuprofen and tylenol  >>> advanced imaging.     OTHER CONSIDERATIONS: SHE has advanced dejenerative joint arthritis neck, per cervical CT scan   This could be culprit.     Prednisone taper (slow) may help symptoms and help her avoid the OTC meds (rebound?)  Neuro referral     - MR Brain w/o Contrast; Future  - predniSONE (DELTASONE) 10 MG tablet; Take four tabs (40 mg) daily       Reduce by 1/2 tab (5 mg) every 5 days, until at \"0\" mg daily  - Adult Neurology  Referral; Future    History of CVA (cerebrovascular accident) without residual deficits  See above.   - MR Brain w/o Contrast; Future  - Adult Neurology  Referral; Future    PAD (peripheral artery disease) (H)   h/o     Moderate recurrent major depression (H)       COPD, moderate (H)   she feels her breathing has not recovered all the way since 3/2023 intervention (zpack and 5d predniisone) \"My breathing and headache felt great for those 4 days\"  >>> Slower prednisone taper.   - predniSONE (DELTASONE) 10 MG tablet; Take four tabs (40 mg) daily       Reduce by 1/2 tab (5 mg) every 5 days, until at \"0\" mg daily      I spent a total of 30 minutes on the day of the visit.   Time spent by me doing chart review, history and exam, documentation and further activities per the note             Yue Bermudez MD  [facility]    Subjective   Amie is a 62 year old, presenting for the following health issues:  No chief complaint on file.      61 y/o F, video visit, to discuss headaches.    Missed work on " "Tuesday.  Using ibu, asa..   Will help for 3h  Back of neck, then a \"band\" around head.   The headache will awaken her at night.     She has vertigo with turning head quickly.    Using ibuprofen about 6 tabs per day.   Alternates with tylenol  Had CVA 2 Y ago:   Visual symptoms at that time.    No residual.      Hospitalized 3/1-3/4/23 for COPD exacerbation w/acute bronchitis   Never \"fuilly recovered from that\"    .... Required supplemental oxygen     Hospitalezed 11/27-12/1/22 with Acute hypercapnic respiratory failure secondary to influenza A and probable community acquired pneumonia    Has been on cymbalta for 4-5 years           3/31/2023    10:44 AM   Additional Questions   Roomed by Ronda Shah  Onset: 8 weeks  Description: headache  Intensity: moderate, severe  Progression of Symptoms:  constant  Accompanying Signs & Symptoms: Dizziness  Previous history of similar problem:   Precipitating factors:        Worsened by: nothing  Alleviating factors:        Improved by: lying down   Therapies tried and outcome: ibuprofen, tylenol           Review of Systems   Constitutional, HEENT, cardiovascular, pulmonary, gi and gu systems are negative, except as otherwise noted.      Objective           Vitals:  No vitals were obtained today due to virtual visit.    Physical Exam   GENERAL: Healthy, alert and no distress   She is in a car, on way to a concert.    EYES: Eyes grossly normal to inspection.  No discharge or erythema, or obvious scleral/conjunctival abnormalities.  RESP: No audible wheeze, cough, or visible cyanosis.  No visible retractions or increased work of breathing.    SKIN: Visible skin clear. No significant rash, abnormal pigmentation or lesions.  NEURO: Cranial nerves grossly intact.  Mentation and speech appropriate for age.  PSYCH: Mentation appears normal, affect normal/bright, judgement and insight intact, normal speech and appearance well-groomed.    Office Visit on 04/12/2022 "   Component Date Value Ref Range Status     FVC-Pred 04/12/2022 3.25  L Final     FVC-Pre 04/12/2022 3.16  L Final     FVC-%Pred-Pre 04/12/2022 97  % Final     FEV1-Pre 04/12/2022 1.48  L Final     FEV1-%Pred-Pre 04/12/2022 58  % Final     FEV1FVC-Pred 04/12/2022 79  % Final     FEV1FVC-Pre 04/12/2022 47  % Final     FEFMax-Pred 04/12/2022 6.39  L/sec Final     FEFMax-Pre 04/12/2022 3.35  L/sec Final     FEFMax-%Pred-Pre 04/12/2022 52  % Final     FEF2575-Pred 04/12/2022 2.28  L/sec Final     FEF2575-Pre 04/12/2022 0.55  L/sec Final     GHC7185-%Pred-Pre 04/12/2022 24  % Final     ExpTime-Pre 04/12/2022 11.04  sec Final     FIFMax-Pre 04/12/2022 3.92  L/sec Final     VC-Pred 04/12/2022 3.38  L Final     VC-Pre 04/12/2022 2.99  L Final     VC-%Pred-Pre 04/12/2022 88  % Final     IC-Pred 04/12/2022 2.45  L Final     IC-Pre 04/12/2022 2.01  L Final     IC-%Pred-Pre 04/12/2022 82  % Final     ERV-Pred 04/12/2022 0.93  L Final     ERV-Pre 04/12/2022 0.98  L Final     ERV-%Pred-Pre 04/12/2022 104  % Final     FEV1FEV6-Pred 04/12/2022 81  % Final     FEV1FEV6-Pre 04/12/2022 53  % Final     FRCPleth-Pred 04/12/2022 2.76  L Final     FRCPleth-Pre 04/12/2022 4.21  L Final     FRCPleth-%Pred-Pre 04/12/2022 152  % Final     RVPleth-Pred 04/12/2022 1.96  L Final     RVPleth-Pre 04/12/2022 3.24  L Final     RVPleth-%Pred-Pre 04/12/2022 164  % Final     TLCPleth-Pred 04/12/2022 5.11  L Final     TLCPleth-Pre 04/12/2022 6.23  L Final     TLCPleth-%Pred-Pre 04/12/2022 121  % Final     DLCOunc-Pred 04/12/2022 20.83  ml/min/mmHg Final     DLCOunc-Pre 04/12/2022 13.99  ml/min/mmHg Final     DLCOunc-%Pred-Pre 04/12/2022 67  % Final     VA-Pre 04/12/2022 4.66  L Final     VA-%Pred-Pre 04/12/2022 93  % Final     FEV1SVC-Pred 04/12/2022 76  % Final     FEV1SVC-Pre 04/12/2022 50  % Final               Video-Visit Details    Type of service:  Video Visit   Video Start Time: 5:30   Video End Time:5:59 PM    Originating Location (pt.  Location): Other she was in a car, in [facility]    Distant Location (provider location):  On-site  Platform used for Video Visit: Kayden

## 2023-04-20 NOTE — PATIENT INSTRUCTIONS
Prednisone taper as instructed  Hold ibuprofen/aspirin    Call to schedule imaging       MRI brain.

## 2023-05-03 ENCOUNTER — ANCILLARY PROCEDURE (OUTPATIENT)
Dept: MRI IMAGING | Facility: CLINIC | Age: 63
End: 2023-05-03
Attending: INTERNAL MEDICINE
Payer: COMMERCIAL

## 2023-05-03 DIAGNOSIS — Z86.73 HISTORY OF CVA (CEREBROVASCULAR ACCIDENT) WITHOUT RESIDUAL DEFICITS: ICD-10-CM

## 2023-05-03 DIAGNOSIS — G44.52 NEW DAILY PERSISTENT HEADACHE: ICD-10-CM

## 2023-05-03 PROCEDURE — 70551 MRI BRAIN STEM W/O DYE: CPT | Mod: TC | Performed by: RADIOLOGY

## 2023-05-03 NOTE — LETTER
May 4, 2023      Amie Pereira  [address] [address] [place] [id number]        Dear Amie:    We are writing to inform you of your test results.    MRI shows no pathology.  There are age related changes only.    Resulted Orders   MR Brain w/o Contrast    Narrative    MRI BRAIN WITHOUT CONTRAST  5/3/2023 11:07 AM    HISTORY:  Headache; Chronic HA (>= 3 months); HA with  refractory/debilitating pain; No known/automatically detected  potential contraindications to MRI; New daily persistent headache.  History of CVA (cerebrovascular accident) without residual deficits.    TECHNIQUE:  Multiplanar, multisequence MRI of the brain without  gadolinium IV contrast material.      COMPARISON:  Head MRI 3/3/2022.    FINDINGS: Mild generalized parenchymal volume loss is present. Patchy  nonspecific white matter T2 hyperintensities likely represent moderate  chronic small vessel ischemic change. No evidence of recent ischemia,  hemorrhage, mass, mass effect, or hydrocephalus.    Marrow signal is within normal limits. Trace paranasal sinus mucosal  thickening. Trace fluid signal within the right mastoid cavity.      Impression    IMPRESSION:  1. No acute abnormality.  2. Volume loss and scattered nonspecific white matter T2  hyperintensities which presumably represent chronic small vessel  ischemic change, unchanged.     GLO TERRAZAS MD         SYSTEM ID:  [id number]       If you have any questions or concerns, please call the clinic at the number listed above.       Sincerely,      Yue Bermudez MD/marta

## 2023-05-04 DIAGNOSIS — F43.23 ADJUSTMENT DISORDER WITH MIXED ANXIETY AND DEPRESSED MOOD: ICD-10-CM

## 2023-05-04 RX ORDER — DIAZEPAM 10 MG
TABLET ORAL
Qty: 10 TABLET | Refills: 1 | Status: SHIPPED | OUTPATIENT
Start: 2023-05-04 | End: 2023-06-29

## 2023-05-04 NOTE — RESULT ENCOUNTER NOTE
Georgette MATA Randall    MRI shows no pathology.  There are age related changes only    Sincerely,       MENDEZ RUFFIN M.D.

## 2023-05-15 ENCOUNTER — MYC MEDICAL ADVICE (OUTPATIENT)
Dept: INTERNAL MEDICINE | Facility: CLINIC | Age: 63
End: 2023-05-15
Payer: COMMERCIAL

## 2023-05-15 DIAGNOSIS — J44.9 COPD, MODERATE (H): Chronic | ICD-10-CM

## 2023-05-15 DIAGNOSIS — G44.52 NEW DAILY PERSISTENT HEADACHE: ICD-10-CM

## 2023-05-15 RX ORDER — PREDNISONE 10 MG/1
TABLET ORAL
Qty: 90 TABLET | Refills: 1 | Status: SHIPPED | OUTPATIENT
Start: 2023-05-15 | End: 2023-11-07

## 2023-05-15 NOTE — TELEPHONE ENCOUNTER
"Routing to provider to advise. Per Prednisone rx Sig: Take four tabs (40 mg) daily       Reduce by 1/2 tab (5 mg) every 5 days, until at \"0\" mg daily    Patrica Wick RN  [facility]    "

## 2023-05-17 DIAGNOSIS — R91.8 PULMONARY NODULES: ICD-10-CM

## 2023-05-17 DIAGNOSIS — J44.9 COPD, MODERATE (H): ICD-10-CM

## 2023-05-17 RX ORDER — ALBUTEROL SULFATE 90 UG/1
2 AEROSOL, METERED RESPIRATORY (INHALATION) EVERY 6 HOURS PRN
Qty: 18 G | Refills: 4 | OUTPATIENT
Start: 2023-05-17

## 2023-05-17 RX ORDER — TIOTROPIUM BROMIDE 18 UG/1
18 CAPSULE ORAL; RESPIRATORY (INHALATION) DAILY
Qty: 90 CAPSULE | Refills: 3 | OUTPATIENT
Start: 2023-05-17

## 2023-05-17 NOTE — TELEPHONE ENCOUNTER
Rx refill requests received from [facility] for albuterol (PROAIR HFA/PROVENTIL HFA/VENTOLIN HFA) 108 (90 Base) MCG/ACT inhaler and tiotropium (SPIRIVA) 18 MCG inhaled capsule. Patient is overdue for follow up in the pulmonary clinic. My Chart message has been sent to the patient requesting that she schedule a follow up appointment so that her medicines can be refilled.      Steven Grant LPN  Pulmonary Medicine:  [facility]  Phone: [phone] Fax: [phone]

## 2023-05-23 ENCOUNTER — PATIENT OUTREACH (OUTPATIENT)
Dept: CARE COORDINATION | Facility: CLINIC | Age: 63
End: 2023-05-23
Payer: COMMERCIAL

## 2023-06-02 DIAGNOSIS — J44.9 COPD, MODERATE (H): ICD-10-CM

## 2023-06-02 RX ORDER — ALBUTEROL SULFATE 90 UG/1
AEROSOL, METERED RESPIRATORY (INHALATION)
Qty: 18 G | Refills: 0 | Status: SHIPPED | OUTPATIENT
Start: 2023-06-02 | End: 2023-06-22

## 2023-06-22 DIAGNOSIS — J44.9 COPD, MODERATE (H): ICD-10-CM

## 2023-06-22 RX ORDER — ALBUTEROL SULFATE 90 UG/1
AEROSOL, METERED RESPIRATORY (INHALATION)
Qty: 18 G | Refills: 3 | Status: SHIPPED | OUTPATIENT
Start: 2023-06-22 | End: 2023-12-06

## 2023-06-23 ENCOUNTER — MYC MEDICAL ADVICE (OUTPATIENT)
Dept: INTERNAL MEDICINE | Facility: CLINIC | Age: 63
End: 2023-06-23
Payer: COMMERCIAL

## 2023-06-23 DIAGNOSIS — B37.0 THRUSH: Primary | ICD-10-CM

## 2023-06-23 RX ORDER — FLUCONAZOLE 100 MG/1
100 TABLET ORAL DAILY
Qty: 15 TABLET | Refills: 0 | Status: SHIPPED | OUTPATIENT
Start: 2023-06-23 | End: 2023-07-08

## 2023-06-29 DIAGNOSIS — F43.23 ADJUSTMENT DISORDER WITH MIXED ANXIETY AND DEPRESSED MOOD: ICD-10-CM

## 2023-06-29 RX ORDER — DIAZEPAM 10 MG
TABLET ORAL
Qty: 10 TABLET | Refills: 0 | Status: SHIPPED | OUTPATIENT
Start: 2023-06-29 | End: 2023-08-02

## 2023-07-28 DIAGNOSIS — M79.7 FIBROMYALGIA: ICD-10-CM

## 2023-07-28 DIAGNOSIS — F43.23 ADJUSTMENT DISORDER WITH MIXED ANXIETY AND DEPRESSED MOOD: ICD-10-CM

## 2023-07-28 RX ORDER — DULOXETIN HYDROCHLORIDE 60 MG/1
CAPSULE, DELAYED RELEASE ORAL
Qty: 90 CAPSULE | Refills: 1 | OUTPATIENT
Start: 2023-07-28

## 2023-07-31 ENCOUNTER — OFFICE VISIT (OUTPATIENT)
Dept: NURSING | Facility: CLINIC | Age: 63
End: 2023-07-31
Payer: COMMERCIAL

## 2023-07-31 ENCOUNTER — OFFICE VISIT (OUTPATIENT)
Dept: PULMONOLOGY | Facility: CLINIC | Age: 63
End: 2023-07-31
Payer: COMMERCIAL

## 2023-07-31 VITALS — HEART RATE: 85 BPM | BODY MASS INDEX: 21.1 KG/M2 | OXYGEN SATURATION: 98 % | WEIGHT: 130.7 LBS

## 2023-07-31 VITALS
HEART RATE: 83 BPM | WEIGHT: 130 LBS | BODY MASS INDEX: 20.98 KG/M2 | DIASTOLIC BLOOD PRESSURE: 83 MMHG | OXYGEN SATURATION: 97 % | SYSTOLIC BLOOD PRESSURE: 130 MMHG

## 2023-07-31 DIAGNOSIS — R91.8 PULMONARY NODULES: ICD-10-CM

## 2023-07-31 DIAGNOSIS — Z87.891 PERSONAL HISTORY OF TOBACCO USE, PRESENTING HAZARDS TO HEALTH: ICD-10-CM

## 2023-07-31 DIAGNOSIS — J44.9 COPD, MODERATE (H): Primary | Chronic | ICD-10-CM

## 2023-07-31 DIAGNOSIS — Z87.891 PERSONAL HISTORY OF TOBACCO USE: Primary | ICD-10-CM

## 2023-07-31 DIAGNOSIS — J44.9 COPD, MODERATE (H): Chronic | ICD-10-CM

## 2023-07-31 DIAGNOSIS — R06.02 SHORTNESS OF BREATH: ICD-10-CM

## 2023-07-31 DIAGNOSIS — J44.9 CHRONIC OBSTRUCTIVE PULMONARY DISEASE, UNSPECIFIED COPD TYPE (H): ICD-10-CM

## 2023-07-31 PROCEDURE — 94729 DIFFUSING CAPACITY: CPT | Performed by: INTERNAL MEDICINE

## 2023-07-31 PROCEDURE — G0296 VISIT TO DETERM LDCT ELIG: HCPCS | Performed by: INTERNAL MEDICINE

## 2023-07-31 PROCEDURE — 99215 OFFICE O/P EST HI 40 MIN: CPT | Mod: 25 | Performed by: INTERNAL MEDICINE

## 2023-07-31 PROCEDURE — 94375 RESPIRATORY FLOW VOLUME LOOP: CPT | Performed by: INTERNAL MEDICINE

## 2023-07-31 RX ORDER — FLUTICASONE PROPIONATE AND SALMETEROL 500; 50 UG/1; UG/1
1 POWDER RESPIRATORY (INHALATION) EVERY 12 HOURS
Qty: 3 EACH | Refills: 11 | Status: SHIPPED | OUTPATIENT
Start: 2023-07-31 | End: 2024-07-31

## 2023-07-31 RX ORDER — TIOTROPIUM BROMIDE 18 UG/1
18 CAPSULE ORAL; RESPIRATORY (INHALATION) DAILY
Qty: 90 CAPSULE | Refills: 3 | Status: SHIPPED | OUTPATIENT
Start: 2023-07-31 | End: 2024-07-31

## 2023-07-31 RX ORDER — MOMETASONE FUROATE AND FORMOTEROL FUMARATE DIHYDRATE 200; 5 UG/1; UG/1
2 AEROSOL RESPIRATORY (INHALATION) 2 TIMES DAILY
Qty: 13 G | Refills: 11 | Status: CANCELLED | OUTPATIENT
Start: 2023-07-31

## 2023-07-31 RX ORDER — AZITHROMYCIN 250 MG/1
250 TABLET, FILM COATED ORAL
Qty: 12 TABLET | Refills: 11 | Status: SHIPPED | OUTPATIENT
Start: 2023-07-31 | End: 2023-11-07

## 2023-07-31 RX ORDER — ALBUTEROL SULFATE 0.83 MG/ML
2.5 SOLUTION RESPIRATORY (INHALATION) 4 TIMES DAILY
Qty: 100 ML | Refills: 4 | Status: SHIPPED | OUTPATIENT
Start: 2023-07-31 | End: 2024-07-31

## 2023-07-31 ASSESSMENT — PAIN SCALES - GENERAL: PAINLEVEL: NO PAIN (0)

## 2023-07-31 NOTE — PATIENT INSTRUCTIONS
Lung Cancer Screening   Frequently Asked Questions  If you are at high-risk for lung cancer, getting screened with low-dose computed tomography (LDCT) every year can help save your life. This handout offers answers to some of the most common questions about lung cancer screening. If you have other questions, please call [phone] ([phone]).     What is it?  Lung cancer screening uses special X-ray technology to create an image of your lung tissue. The exam is quick and easy and takes less than 10 seconds. We don t give you any medicine or use any needles. You can eat before and after the exam. You don t need to change your clothes as long as the clothing on your chest doesn t contain metal. But, you do need to be able to hold your breath for at least 6 seconds during the exam.    What is the goal of lung cancer screening?  The goal of lung cancer screening is to save lives. Many times, lung cancer is not found until a person starts having physical symptoms. Lung cancer screening can help detect lung cancer in the earliest stages when it may be easier to treat.    Who should be screened for lung cancer?  We suggest lung cancer screening for anyone who is at high-risk for lung cancer. You are in the high-risk group if you:      are between the ages of 55 and 79, and    have smoked at least 1 pack of cigarettes a day for 20 or more years, and    still smoke or have quit within the past 15 years.    However, if you have a new cough or shortness of breath, you should talk to your doctor before being screened.    Why does it matter if I have symptoms?  Certain symptoms can be a sign that you have a condition in your lungs that should be checked and treated by your doctor. These symptoms include fever, chest pain, a new or changing cough, shortness of breath that you have never felt before, coughing up blood or unexplained weight loss. Having any of these symptoms can greatly affect the results of lung  cancer screening.       Should all smokers get an LDCT lung cancer screening exam?  It depends. Lung cancer screening is for a very specific group of men and women who have a history of heavy smoking over a long period of time (see  Who should be screened for lung cancer  above).  I am in the high-risk group, but have been diagnosed with cancer in the past. Is LDCT lung cancer screening right for me?  In some cases, you should not have LDCT lung screening, such as when your doctor is already following your cancer with CT scan studies. Your doctor will help you decide if LDCT lung screening is right for you.  Do I need to have a screening exam every year?  Yes. If you are in the high-risk group described earlier, you should get an LDCT lung cancer screening exam every year until you are 79, or are no longer willing or able to undergo screening and possible procedures to diagnose and treat lung cancer.  How effective is LDCT at preventing death from lung cancer?  Studies have shown that LDCT lung cancer screening can lower the risk of death from lung cancer by 20 percent in people who are at high-risk.  What are the risks?  There are some risks and limitations of LDCT lung cancer screening. We want to make sure you understand the risks and benefits, so please let us know if you have any questions. Your doctor may want to talk with you more about these risks.    Radiation exposure: As with any exam that uses radiation, there is a very small increased risk of cancer. The amount of radiation in LDCT is small--about the same amount a person would get from a mammogram. Your doctor orders the exam when he or she feels the potential benefits outweigh the risks.    False negatives: No test is perfect, including LDCT. It is possible that you may have a medical condition, including lung cancer, that is not found during your exam. This is called a false negative result.    False positives and more testing: LDCT very often finds  something in the lung that could be cancer, but in fact is not. This is called a false positive result. False positive tests often cause anxiety. To make sure these findings are not cancer, you may need to have more tests. These tests will be done only if you give us permission. Sometimes patients need a treatment that can have side effects, such as a biopsy. For more information on false positives, see  What can I expect from the results?     Findings not related to lung cancer: Your LDCT exam also takes pictures of areas of your body next to your lungs. In a very small number of cases, the CT scan will show an abnormal finding in one of these areas, such as your kidneys, adrenal glands, liver or thyroid. This finding may not be serious, but you may need more tests. Your doctor can help you decide what other tests you may need, if any.  What can I expect from the results?  About 1 out of 4 LDCT exams will find something that may need more tests. Most of the time, these findings are lung nodules. Lung nodules are very small collections of tissue in the lung. These nodules are very common, and the vast majority--more than 97 percent--are not cancer (benign). Most are normal lymph nodes or small areas of scarring from past infections.  But, if a small lung nodule is found to be cancer, the cancer can be cured more than 90 percent of the time. To know if the nodule is cancer, we may need to get more images before your next yearly screening exam. If the nodule has suspicious features (for example, it is large, has an odd shape or grows over time), we will refer you to a specialist for further testing.  Will my doctor also get the results?  Yes. Your doctor will get a copy of your results.  Is it okay to keep smoking now that there s a cancer screening exam?  No. Tobacco is one of the strongest cancer-causing agents. It causes not only lung cancer, but other cancers and cardiovascular (heart) diseases as well. The damage  caused by smoking builds over time. This means that the longer you smoke, the higher your risk of disease. While it is never too late to quit, the sooner you quit, the better.  Where can I find help to quit smoking?  The best way to prevent lung cancer is to stop smoking. If you have already quit smoking, congratulations and keep it up! For help on quitting smoking, please call [employer] at 1-600-QUITNOW (1-145.710.3709) or the American [employer] Society at 1-815.774.2617 to find local resources near you.  One-on-one health coaching:  If you d prefer to work individually with a health care provider on tobacco cessation, we offer:      Medication Therapy Management:  Our specially trained pharmacists work closely with you and your doctor to help you quit smoking.  Call [phone] or [phone] (toll free).

## 2023-07-31 NOTE — PROGRESS NOTES
Pulmonary Clinic New Patient Consult  Reason for Consult: COPD  History of Present Illness  Amie Pereira is a pleasant 62 yof with history of COPD with moderately severe obstruction on PFTs who presents to clinic for follow up for same. She was last seen in clinic in 5/2022.  To briefly review, Amie has severe COPD with considerable SOB and exercise limitation and she is on a regimen of high dose Dulera, Spiriva and scheduled nebulized albuterol in the morning. She has had frequent hospitalizations for COPD exacerbations at [facility] in 3/2023 and 5/2023. The initial hospitalization appeared to have been triggered by influenza pneumonia.  Today, she is doing somewhat better but continues to be very symptomatic.  At present she would be able to walk 3 - 4  blocks before she would need to stop and rest.  She previously smoked up to 1/2 pack per day cigarettes for 30 years and quit about 14 years ago and has vaped since that time. She uses her albuterol inhaler every day. No orthopnea, pedal swellings or weight loss.    Review of Systems:  10 of 14 systems reviewed and are negative unless otherwise stated in HPI.    Past Medical History:   Diagnosis Date    ACL (anterior cruciate ligament) tear 2/27/2012    Left     Chronic obstructive pulmonary disease with acute exacerbation (H) 11/23/2014    First hospitalization 11/23/14     CKD (chronic kidney disease) stage 3, GFR 30-59 ml/min (H) 6/30/2020    CTS (carpal tunnel syndrome) 5/2010    Degenerative joint disease     Temporomandibular joint disorders, unspecified        Past Surgical History:   Procedure Laterality Date    COLONOSCOPY N/A 3/11/2022    Procedure: COLONOSCOPY;  Surgeon: Trell Courtney MD;  Location: [facility]    HYSTERECTOMY VAGINAL      [facility] ARTHROSCOPY/SURGERY  2000       Family History   Problem Relation Age of Onset    Breast Cancer Maternal Grandmother     Coronary Artery Disease Maternal Grandmother         MI at 41    Cerebrovascular  Disease Mother     Hypertension Mother     Heart Disease Mother     Cancer Mother         Lung    Substance Abuse Mother     Cerebrovascular Disease Father     Hypertension Father     Cancer Father         Metastatic, primary lung/Prostate    Substance Abuse Father     Cancer Other     Substance Abuse Brother     Depression Son     Schizophrenia Brother     Bipolar Disorder Brother     Substance Abuse Brother     Aneurysm Paternal Uncle         Brain Aneurysms       Social History     Socioeconomic History    Marital status:      Spouse name: None    Number of children: None    Years of education: None    Highest education level: None   Tobacco Use    Smoking status: Former Smoker     Packs/day: 1.00     Years: 32.00     Pack years: 32.00     Types: Cigarettes, Other     Quit date: 2012     Years since quittin.3    Smokeless tobacco: Never Used    Tobacco comment: using nicotine replacement: e cigarattes   Vaping Use    Vaping Use: Never used   Substance and Sexual Activity    Alcohol use: No     Alcohol/week: 0.0 standard drinks    Drug use: No    Sexual activity: Never     Partners: Male     Birth control/protection: Surgical   Other Topics Concern    Parent/sibling w/ CABG, MI or angioplasty before 65F 55M? Yes   Social History Narrative    Lives in apartment, single ().  2 kids.  Drives [employer].          The patient has a 30 pk yr tobacco hx.  She has no active use but using e -cig. Quit actual cig Oct 2012.  Alcohol use is 0 alcoholic drinks per week.  She denies use of recreational drugs.          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          The patient is single.  Has 2 children.        Hot Tube Exposure: NO    Recent Travel: NO     Hx of incarceration:  NO    Bird Exposure:   NO    Animal Exposure:  NO    Inhalation Exposure:  NO                 Allergies   Allergen Reactions    Acetaminophen     Buspirone Nausea    Hydrocodone     Lisinopril  "Cough    Vicodin [Hydrocodone-Acetaminophen] Nausea and Vomiting         Current Outpatient Medications:     albuterol (PROVENTIL) (2.5 MG/3ML) 0.083% neb solution, Take 1 vial (2.5 mg) by nebulization 4 times daily, Disp: 100 mL, Rfl: 4    aspirin (ASA) 325 MG EC tablet, Take 325 mg by mouth every 6 hours as needed, Disp: , Rfl:     azithromycin (ZITHROMAX) 250 MG tablet, Take 1 tablet (250 mg) by mouth Every Mon, Wed, Fri Morning for 336 days, Disp: 12 tablet, Rfl: 11    clobetasol (TEMOVATE) 0.05 % external ointment, Apply topically 2 times daily, Disp: 15 g, Rfl: 0    diazepam (VALIUM) 10 MG tablet, TAKE 1/2 TO 1 TABLET BY MOUTH DAILY AS NEEDED FOR ANXIETY, Disp: 10 tablet, Rfl: 0    DULERA 200-5 MCG/ACT inhaler, INHALE 2 PUFFS INTO THE LUNGS TWICE DAILY, Disp: 13 g, Rfl: 11    DULoxetine (CYMBALTA) 60 MG capsule, TAKE 1 CAPSULE(60 MG) BY MOUTH DAILY, Disp: 90 capsule, Rfl: 1    estradiol (VAGIFEM) 10 MCG TABS vaginal tablet, Place 1 tablet (10 mcg) vaginally twice a week, Disp: 24 tablet, Rfl: 4    fluticasone-salmeterol (ADVAIR) 500-50 MCG/ACT inhaler, Inhale 1 puff into the lungs every 12 hours for 90 days, Disp: 3 each, Rfl: 11    losartan-hydrochlorothiazide (HYZAAR) 100-25 MG tablet, TAKE ONE TABLET BY MOUTH DAILY, Disp: 90 tablet, Rfl: 1    metoprolol succinate ER (TOPROL XL) 50 MG 24 hr tablet, TAKE 1 TABLET(50 MG) BY MOUTH DAILY, Disp: 90 tablet, Rfl: 3    nicotine (NICODERM CQ) 21 MG/24HR 24 hr patch, Place 1 patch onto the skin every 24 hours, Disp: 90 patch, Rfl: 4    nicotine (NICODERM CQ) 21 MG/24HR 24 hr patch, Place 1 patch onto the skin every 24 hours, Disp: 28 patch, Rfl: 0    predniSONE (DELTASONE) 10 MG tablet, Take four tabs (40 mg) daily       Reduce by 1/2 tab (5 mg) every 5 days, until at \"0\" mg daily, Disp: 90 tablet, Rfl: 1    rosuvastatin (CRESTOR) 20 MG tablet, Take 1 tablet (20 mg) by mouth daily, Disp: 90 tablet, Rfl: 4    tiotropium (SPIRIVA) 18 MCG inhaled capsule, Inhale 1 " capsule (18 mcg) into the lungs daily for 360 days, Disp: 90 capsule, Rfl: 3    tiotropium (SPIRIVA) 18 MCG inhaled capsule, Inhale 1 capsule (18 mcg) into the lungs daily, Disp: 90 capsule, Rfl: 3    VENTOLIN  (90 Base) MCG/ACT inhaler, INHALE 2 PUFFS INTO THE LUNGS EVERY 6 HOURS AS NEEDED FOR SHORTNESS OF BREATH OR DIFFICULT BREATHING OR WHEEZING, Disp: 18 g, Rfl: 3      Physical Exam:  /83 (BP Location: Left arm, Patient Position: Sitting, Cuff Size: Adult Regular)   Pulse 83   Wt 59 kg (130 lb)   SpO2 97%   BMI 20.98 kg/m    GENERAL: Well developed, well nourished, alert, and in no apparent distress.  HEENT: Normocephalic, atraumatic. PERRL, EOMI. Oral mucosa is moist. No perioral cyanosis.  NECK: supple, no masses, no thyromegaly.  RESP:  Normal respiratory effort.  CTAB.  No rales, wheezes, rhonchi.  No cyanosis or clubbing.  CV: Normal S1, S2, regular rhythm, normal rate. No murmur.  No LE edema.   ABDOMEN:  Soft, non-tender, non-distended.   SKIN: warm and dry. No rash.  NEURO: AAOx3.  Normal gait.  Fluent speech.  PSYCH: mentation appears normal.     Results:  PFTs: Discussed and reviewed with patient- moderate obstruction with impaired diffusion capacity  Most Recent [facility] Pulmonary Function Testing    FVC-Pred   Date Value Ref Range Status   07/31/2023 2.96 L      FVC-Pre   Date Value Ref Range Status   07/31/2023 2.64 L      FVC-%Pred-Pre   Date Value Ref Range Status   07/31/2023 89 %      FEV1-Pre   Date Value Ref Range Status   07/31/2023 1.19 L      FEV1-%Pred-Pre   Date Value Ref Range Status   07/31/2023 50 %      FEV1FVC-Pred   Date Value Ref Range Status   07/31/2023 80 %      FEV1FVC-Pre   Date Value Ref Range Status   07/31/2023 45 %      No results found for: 20029  FEFMax-Pred   Date Value Ref Range Status   07/31/2023 6.34 L/sec      FEFMax-Pre   Date Value Ref Range Status   07/31/2023 2.84 L/sec      FEFMax-%Pred-Pre   Date Value Ref Range Status   07/31/2023 44 %       ExpTime-Pre   Date Value Ref Range Status   07/31/2023 9.65 sec      FIFMax-Pre   Date Value Ref Range Status   07/31/2023 4.34 L/sec      FEV1FEV6-Pred   Date Value Ref Range Status   07/31/2023 80 %      FEV1FEV6-Pre   Date Value Ref Range Status   07/31/2023 50 %      No results found for: 20055  Imaging (personally reviewed in clinic today):    Assessment and Plan:   COPD (Group D)    PFTs show moderately severe obstruction and there appears to be a drop in FEV1 since last visit. CAT score of 30 suggestive significant symptoms. There has also been frequent exacerbations.  She is already on triple inhaler regimen (Dulera/Spiriva). I will try to switch her Dulera to Advair for better insurance coverage. We will continue with the remainder of her regimen and prescriptions were given today.   She will benefit from chronic azithromycin 250 mg po on M/W/F. Her recent Qtc is < 450 mS. Prescription was given. We will discuss pulmonary rehab during the next clinic visit, several referrals have been placed but I don't see that she has performed one yet. Low suspicion for pulmonary hypertension.  Prescriptions for all three inhalers listed above were renewed today.    History of smoking  Lung Cancer Screening Shared Decision Making Visit   Georgette Pereira, a 61 year old female, is eligible for lung cancer screening  History   Smoking Status    Former Smoker    Packs/day: 1.00    Years: 32.00    Types: Cigarettes, Other    Quit date: 12/31/2012   Smokeless Tobacco    Never Used     Comment: using nicotine replacement: e cigarattes     I have discussed with patient the risks and benefits of screening for lung cancer with low-dose CT.   The risks include:  radiation exposure: one low dose chest CT has as much ionizing radiation as about 15 chest x-rays, or 6 months of background radiation living in [place]    false positives: most findings/nodules are NOT cancer, but some might still require additional diagnostic  evaluation, including biopsy  over-diagnosis: some slow growing cancers that might never have been clinically significant will be detected and treated unnecessarily   The benefit of early detection of lung cancer is contingent upon adherence to annual screening or more frequent follow up if indicated.   Furthermore, to benefit from screening, Georgette must be willing and able to undergo diagnostic procedures, if indicated. Although no specific guide is available for determining severity of comorbidities, it is reasonable to withhold screening in patients who have greater mortality risk from other diseases.   We did discuss that the best way to prevent lung cancer is to not smoke.  Some patients may value a numeric estimation of lung cancer risk when evaluating if lung cancer screening is right for them, here is one calculator:  Questions and concerns were answered to the patient's satisfaction.  she was provided with my contact information should new questions or concerns arise in the interim.  she should return to clinic in 12 months   Up to date on vaccinations (due for shingles vaccine)  I spent a total of 40 minutes face to face with Georgette Pereira during today's office visit excluding time spent on lung cancer screening. Over 50% of this time was spent counseling the patient and/or coordinating care regarding their pulmonary disease.    Lucia Reyes MD  Pulmonary, Critical Care and Sleep Medicine  [facility]  Pager: [phone]        The above note was dictated using voice recognition software and may include typographical errors. Please contact the author for any clarifications.                              Lung Cancer Screening Shared Decision Making Visit     Georgette Pereira, a 62 year old female, is eligible for lung cancer screening    History   Smoking Status    Former    Packs/day: 1.00    Years: 32.00    Types: Cigarettes, Other    Quit date: 12/31/2012   Smokeless  Tobacco    Never       I have discussed with patient the risks and benefits of screening for lung cancer with low-dose CT.     The risks include:    radiation exposure: one low dose chest CT has as much ionizing radiation as about 15 chest x-rays, or 6 months of background radiation living in [place]      false positives: most findings/nodules are NOT cancer, but some might still require additional diagnostic evaluation, including biopsy    over-diagnosis: some slow growing cancers that might never have been clinically significant will be detected and treated unnecessarily     The benefit of early detection of lung cancer is contingent upon adherence to annual screening or more frequent follow up if indicated.     Furthermore, to benefit from screening, Georgette must be willing and able to undergo diagnostic procedures, if indicated. Although no specific guide is available for determining severity of comorbidities, it is reasonable to withhold screening in patients who have greater mortality risk from other diseases.     We did discuss that the best way to prevent lung cancer is to not smoke.    Some patients may value a numeric estimation of lung cancer risk when evaluating if lung cancer screening is right for them, here is one calculator:    ShouldIScreen

## 2023-08-01 LAB
DLCOUNC-%PRED-PRE: 78 %
DLCOUNC-PRE: 15.86 ML/MIN/MMHG
DLCOUNC-PRED: 20.09 ML/MIN/MMHG
ERV-%PRED-PRE: 101 %
ERV-PRE: 0.86 L
ERV-PRED: 0.85 L
EXPTIME-PRE: 9.65 SEC
FEF2575-%PRED-PRE: 22 %
FEF2575-PRE: 0.47 L/SEC
FEF2575-PRED: 2.12 L/SEC
FEFMAX-%PRED-PRE: 44 %
FEFMAX-PRE: 2.84 L/SEC
FEFMAX-PRED: 6.34 L/SEC
FEV1-%PRED-PRE: 50 %
FEV1-PRE: 1.19 L
FEV1FEV6-PRE: 50 %
FEV1FEV6-PRED: 80 %
FEV1FVC-PRE: 45 %
FEV1FVC-PRED: 80 %
FEV1SVC-PRE: 45 %
FEV1SVC-PRED: 68 %
FIFMAX-PRE: 4.34 L/SEC
FVC-%PRED-PRE: 89 %
FVC-PRE: 2.64 L
FVC-PRED: 2.96 L
IC-%PRED-PRE: 69 %
IC-PRE: 1.75 L
IC-PRED: 2.51 L
VA-%PRED-PRE: 97 %
VA-PRE: 4.7 L
VC-%PRED-PRE: 75 %
VC-PRE: 2.61 L
VC-PRED: 3.44 L

## 2023-08-02 DIAGNOSIS — F43.23 ADJUSTMENT DISORDER WITH MIXED ANXIETY AND DEPRESSED MOOD: ICD-10-CM

## 2023-08-02 RX ORDER — DIAZEPAM 10 MG
TABLET ORAL
Qty: 10 TABLET | Refills: 0 | Status: SHIPPED | OUTPATIENT
Start: 2023-08-02 | End: 2023-09-08

## 2023-08-19 ENCOUNTER — HEALTH MAINTENANCE LETTER (OUTPATIENT)
Age: 63
End: 2023-08-19

## 2023-08-28 DIAGNOSIS — J44.9 COPD, MODERATE (H): ICD-10-CM

## 2023-08-28 DIAGNOSIS — J44.9 CHRONIC OBSTRUCTIVE PULMONARY DISEASE, UNSPECIFIED COPD TYPE (H): ICD-10-CM

## 2023-08-28 DIAGNOSIS — R06.02 SHORTNESS OF BREATH: ICD-10-CM

## 2023-08-28 DIAGNOSIS — Z87.891 PERSONAL HISTORY OF TOBACCO USE, PRESENTING HAZARDS TO HEALTH: ICD-10-CM

## 2023-08-29 RX ORDER — MOMETASONE FUROATE AND FORMOTEROL FUMARATE DIHYDRATE 200; 5 UG/1; UG/1
AEROSOL RESPIRATORY (INHALATION)
Qty: 13 G | Refills: 11 | Status: SHIPPED | OUTPATIENT
Start: 2023-08-29 | End: 2024-03-01 | Stop reason: ALTCHOICE

## 2023-09-08 DIAGNOSIS — F43.23 ADJUSTMENT DISORDER WITH MIXED ANXIETY AND DEPRESSED MOOD: ICD-10-CM

## 2023-09-08 RX ORDER — DIAZEPAM 10 MG
TABLET ORAL
Qty: 2 TABLET | Refills: 0 | Status: SHIPPED | OUTPATIENT
Start: 2023-09-08 | End: 2023-10-19

## 2023-09-21 DIAGNOSIS — I10 ESSENTIAL HYPERTENSION WITH GOAL BLOOD PRESSURE LESS THAN 140/90: ICD-10-CM

## 2023-09-21 RX ORDER — LOSARTAN POTASSIUM AND HYDROCHLOROTHIAZIDE 25; 100 MG/1; MG/1
1 TABLET ORAL DAILY
Qty: 90 TABLET | Refills: 1 | Status: SHIPPED | OUTPATIENT
Start: 2023-09-21 | End: 2024-03-01

## 2023-09-22 DIAGNOSIS — F43.23 ADJUSTMENT DISORDER WITH MIXED ANXIETY AND DEPRESSED MOOD: ICD-10-CM

## 2023-09-22 RX ORDER — DIAZEPAM 10 MG
TABLET ORAL
OUTPATIENT
Start: 2023-09-22

## 2023-10-07 DIAGNOSIS — B37.0 THRUSH: ICD-10-CM

## 2023-10-09 RX ORDER — FLUCONAZOLE 100 MG/1
TABLET ORAL
OUTPATIENT
Start: 2023-10-09

## 2023-10-10 ENCOUNTER — MYC MEDICAL ADVICE (OUTPATIENT)
Dept: INTERNAL MEDICINE | Facility: CLINIC | Age: 63
End: 2023-10-10
Payer: COMMERCIAL

## 2023-10-14 DIAGNOSIS — G44.52 NEW DAILY PERSISTENT HEADACHE: ICD-10-CM

## 2023-10-14 DIAGNOSIS — J44.9 COPD, MODERATE (H): Chronic | ICD-10-CM

## 2023-10-16 RX ORDER — PREDNISONE 10 MG/1
TABLET ORAL
Qty: 90 TABLET | Refills: 1 | OUTPATIENT
Start: 2023-10-16

## 2023-10-18 ASSESSMENT — ANXIETY QUESTIONNAIRES
2. NOT BEING ABLE TO STOP OR CONTROL WORRYING: NEARLY EVERY DAY
GAD7 TOTAL SCORE: 18
IF YOU CHECKED OFF ANY PROBLEMS ON THIS QUESTIONNAIRE, HOW DIFFICULT HAVE THESE PROBLEMS MADE IT FOR YOU TO DO YOUR WORK, TAKE CARE OF THINGS AT HOME, OR GET ALONG WITH OTHER PEOPLE: EXTREMELY DIFFICULT
3. WORRYING TOO MUCH ABOUT DIFFERENT THINGS: NEARLY EVERY DAY
4. TROUBLE RELAXING: NEARLY EVERY DAY
1. FEELING NERVOUS, ANXIOUS, OR ON EDGE: NEARLY EVERY DAY
GAD7 TOTAL SCORE: 18
5. BEING SO RESTLESS THAT IT IS HARD TO SIT STILL: NEARLY EVERY DAY
7. FEELING AFRAID AS IF SOMETHING AWFUL MIGHT HAPPEN: MORE THAN HALF THE DAYS
6. BECOMING EASILY ANNOYED OR IRRITABLE: SEVERAL DAYS

## 2023-10-18 ASSESSMENT — PATIENT HEALTH QUESTIONNAIRE - PHQ9
10. IF YOU CHECKED OFF ANY PROBLEMS, HOW DIFFICULT HAVE THESE PROBLEMS MADE IT FOR YOU TO DO YOUR WORK, TAKE CARE OF THINGS AT HOME, OR GET ALONG WITH OTHER PEOPLE: EXTREMELY DIFFICULT
SUM OF ALL RESPONSES TO PHQ QUESTIONS 1-9: 20
SUM OF ALL RESPONSES TO PHQ QUESTIONS 1-9: 20

## 2023-10-19 ENCOUNTER — OFFICE VISIT (OUTPATIENT)
Dept: FAMILY MEDICINE | Facility: CLINIC | Age: 63
End: 2023-10-19
Payer: COMMERCIAL

## 2023-10-19 VITALS
WEIGHT: 129 LBS | DIASTOLIC BLOOD PRESSURE: 89 MMHG | BODY MASS INDEX: 20.73 KG/M2 | RESPIRATION RATE: 20 BRPM | HEART RATE: 74 BPM | TEMPERATURE: 97.6 F | SYSTOLIC BLOOD PRESSURE: 128 MMHG | OXYGEN SATURATION: 98 % | HEIGHT: 66 IN

## 2023-10-19 DIAGNOSIS — F41.9 ANXIETY: Chronic | ICD-10-CM

## 2023-10-19 DIAGNOSIS — G44.209 TENSION HEADACHE: Primary | ICD-10-CM

## 2023-10-19 PROCEDURE — 90472 IMMUNIZATION ADMIN EACH ADD: CPT | Performed by: PHYSICIAN ASSISTANT

## 2023-10-19 PROCEDURE — 90677 PCV20 VACCINE IM: CPT | Performed by: PHYSICIAN ASSISTANT

## 2023-10-19 PROCEDURE — 90715 TDAP VACCINE 7 YRS/> IM: CPT | Performed by: PHYSICIAN ASSISTANT

## 2023-10-19 PROCEDURE — 99213 OFFICE O/P EST LOW 20 MIN: CPT | Mod: 25 | Performed by: PHYSICIAN ASSISTANT

## 2023-10-19 PROCEDURE — 90480 ADMN SARSCOV2 VAC 1/ONLY CMP: CPT | Performed by: PHYSICIAN ASSISTANT

## 2023-10-19 PROCEDURE — 90682 RIV4 VACC RECOMBINANT DNA IM: CPT | Performed by: PHYSICIAN ASSISTANT

## 2023-10-19 PROCEDURE — 91320 SARSCV2 VAC 30MCG TRS-SUC IM: CPT | Performed by: PHYSICIAN ASSISTANT

## 2023-10-19 PROCEDURE — 90471 IMMUNIZATION ADMIN: CPT | Performed by: PHYSICIAN ASSISTANT

## 2023-10-19 RX ORDER — METHOCARBAMOL 500 MG/1
500 TABLET, FILM COATED ORAL 3 TIMES DAILY PRN
Qty: 30 TABLET | Refills: 0 | Status: SHIPPED | OUTPATIENT
Start: 2023-10-19 | End: 2024-05-06

## 2023-10-19 RX ORDER — BUPROPION HYDROCHLORIDE 150 MG/1
150 TABLET ORAL EVERY MORNING
Qty: 90 TABLET | Refills: 0 | Status: SHIPPED | OUTPATIENT
Start: 2023-10-19 | End: 2024-01-16

## 2023-10-19 ASSESSMENT — ENCOUNTER SYMPTOMS: HEADACHES: 1

## 2023-10-19 ASSESSMENT — PAIN SCALES - GENERAL: PAINLEVEL: NO PAIN (1)

## 2023-10-19 NOTE — PROGRESS NOTES
Assessment & Plan       ICD-10-CM    1. Tension headache  G44.209 methocarbamol (ROBAXIN) 500 MG tablet     Physical Therapy Referral      2. Anxiety  F41.9 buPROPion (WELLBUTRIN XL) 150 MG 24 hr tablet     Adult Mental Health  Referral        Talk to patient about her concerns she appears to be under a lot of stress I believe she is got some tension headaches.  We will get her started on methocarbamol.  She can continue with Tylenol ibuprofen.  We will get her set up with physical therapy.  Warning signs were discussed.  Follow-up in 2 weeks as needed.  Her anxiety has increased also up.  We will add Wellbutrin.  Warning signs side effects were discussed.  Recheck in 2 weeks.    Ivan Benitez PA-C  [facility]   Amie is a 63 year old, presenting for the following health issues:  Headache (Per pt getting worse, radiating to jaw and spine.), [facility] Care Coordination - PCP/PCC Contact, and Establish Care (Per pt looking to establish care )        10/19/2023     4:06 PM   Additional Questions   Roomed by Dilcia   Accompanied by n/a       History of Present Illness       CKD: She uses over the counter pain medication, including Tynelol iphrophin, two times daily.    Mental Health Follow-up:  Patient presents to follow-up on Depression & Anxiety.Patient's depression since last visit has been:  Worse  The patient is not having other symptoms associated with depression.  Patient's anxiety since last visit has been:  Worse  The patient is having other symptoms associated with anxiety.  Any significant life events: financial concerns, housing concerns and grief or loss  Patient is feeling anxious or having panic attacks.  Patient has no concerns about alcohol or drug use.    Headaches:   Since the patient's last clinic visit, headaches are: worsened  The patient is getting headaches:  Constant  She is not able to do normal daily activities when she has a migraine.  The patient  "is taking the following rescue/relief medications:  Ibuprofen (Advil, Motrin) and Tylenol   Patient states \"I get no relief\" from the rescue/relief medications.   The patient is taking the following medications to prevent migraines:  No medications to prevent migraines  In the past 4 weeks, the patient has gone to an Urgent Care or Emergency Room 0 times times due to headaches.    She eats 0-1 servings of fruits and vegetables daily.She consumes 1 sweetened beverage(s) daily.She exercises with enough effort to increase her heart rate 9 or less minutes per day.  She exercises with enough effort to increase her heart rate 3 or less days per week.   She is taking medications regularly.     Headache that start in neck and radiates in upper shoulder and back of head.  No numbness/tingling.   Worse with sitting up for long time and better with laying down.   No visual changes.  Occ nausea.   Tx: Ibu, tylenol with no help.     Last eye exam: 3 months with no changes.  Caffeine: none  Sleep: not well in the last couple weeks.   Diet/Exercise: normal but decreased this last week.   Stress: lots of stress. New apartment,   Struggle with anxiety.  She states that is gotten worse over the last few weeks.  She is interested in counseling.      Review of Systems   Neurological:  Positive for headaches.      Constitutional, HEENT, cardiovascular, pulmonary, gi and gu systems are negative, except as otherwise noted.      Objective    /89   Pulse 74   Temp 97.6  F (36.4  C) (Tympanic)   Resp 20   Ht 1.676 m (5' 6\")   Wt 58.5 kg (129 lb)   SpO2 98%   BMI 20.82 kg/m    Body mass index is 20.82 kg/m .  Physical Exam   GENERAL: healthy, alert and no distress  EYES: Eyes grossly normal to inspection, PERRL and conjunctivae and sclerae normal  HENT: ear canals and TM's normal, nose and mouth without ulcers or lesions  NECK: no adenopathy, no asymmetry, masses, or scars and thyroid normal to palpation  Tenderness diffusely in " the upper trap and cervical paraspinal musculature region.  She has pain with generalized range of motion of her neck.  Full range of motion bilateral extremities with 5 5 strength.  RESP: lungs clear to auscultation - no rales, rhonchi or wheezes  CV: regular rate and rhythm, normal S1 S2, no S3 or S4, no murmur, click or rub, no peripheral edema and peripheral pulses strong  MS: no gross musculoskeletal defects noted, no edema  NEURO: Normal strength and tone, mentation intact and speech normal  PSYCH: mentation appears normal, affect normal/bright

## 2023-11-07 ENCOUNTER — OFFICE VISIT (OUTPATIENT)
Dept: FAMILY MEDICINE | Facility: CLINIC | Age: 63
End: 2023-11-07
Payer: COMMERCIAL

## 2023-11-07 VITALS
BODY MASS INDEX: 20.89 KG/M2 | DIASTOLIC BLOOD PRESSURE: 79 MMHG | HEIGHT: 66 IN | RESPIRATION RATE: 18 BRPM | TEMPERATURE: 98.1 F | OXYGEN SATURATION: 100 % | SYSTOLIC BLOOD PRESSURE: 117 MMHG | HEART RATE: 86 BPM | WEIGHT: 130 LBS

## 2023-11-07 DIAGNOSIS — Z12.31 ENCOUNTER FOR SCREENING MAMMOGRAM FOR BREAST CANCER: ICD-10-CM

## 2023-11-07 DIAGNOSIS — N18.31 CHRONIC KIDNEY DISEASE, STAGE 3A (H): ICD-10-CM

## 2023-11-07 DIAGNOSIS — E78.5 HYPERLIPIDEMIA LDL GOAL <100: ICD-10-CM

## 2023-11-07 DIAGNOSIS — G43.909 MIGRAINE WITHOUT STATUS MIGRAINOSUS, NOT INTRACTABLE, UNSPECIFIED MIGRAINE TYPE: Primary | ICD-10-CM

## 2023-11-07 DIAGNOSIS — I10 ESSENTIAL HYPERTENSION WITH GOAL BLOOD PRESSURE LESS THAN 140/90: Chronic | ICD-10-CM

## 2023-11-07 DIAGNOSIS — Z13.1 SCREENING FOR DIABETES MELLITUS: ICD-10-CM

## 2023-11-07 LAB
ALBUMIN SERPL BCG-MCNC: 4.5 G/DL (ref 3.5–5.2)
ALP SERPL-CCNC: 92 U/L (ref 35–104)
ALT SERPL W P-5'-P-CCNC: 15 U/L (ref 0–50)
ANION GAP SERPL CALCULATED.3IONS-SCNC: 13 MMOL/L (ref 7–15)
AST SERPL W P-5'-P-CCNC: 24 U/L (ref 0–45)
BASOPHILS # BLD AUTO: 0.1 10E3/UL (ref 0–0.2)
BASOPHILS NFR BLD AUTO: 1 %
BILIRUB SERPL-MCNC: 0.2 MG/DL
BUN SERPL-MCNC: 23.7 MG/DL (ref 8–23)
CALCIUM SERPL-MCNC: 10.2 MG/DL (ref 8.8–10.2)
CHLORIDE SERPL-SCNC: 101 MMOL/L (ref 98–107)
CHOLEST SERPL-MCNC: 163 MG/DL
CREAT SERPL-MCNC: 1.28 MG/DL (ref 0.51–0.95)
CREAT UR-MCNC: 98.1 MG/DL
DEPRECATED HCO3 PLAS-SCNC: 29 MMOL/L (ref 22–29)
EGFRCR SERPLBLD CKD-EPI 2021: 47 ML/MIN/1.73M2
EOSINOPHIL # BLD AUTO: 0.2 10E3/UL (ref 0–0.7)
EOSINOPHIL NFR BLD AUTO: 2 %
ERYTHROCYTE [DISTWIDTH] IN BLOOD BY AUTOMATED COUNT: 12 % (ref 10–15)
GLUCOSE SERPL-MCNC: 96 MG/DL (ref 70–99)
HBA1C MFR BLD: 5.7 % (ref 0–5.6)
HCT VFR BLD AUTO: 45.1 % (ref 35–47)
HDLC SERPL-MCNC: 51 MG/DL
HGB BLD-MCNC: 14.4 G/DL (ref 11.7–15.7)
IMM GRANULOCYTES # BLD: 0 10E3/UL
IMM GRANULOCYTES NFR BLD: 0 %
LDLC SERPL CALC-MCNC: 56 MG/DL
LYMPHOCYTES # BLD AUTO: 2.2 10E3/UL (ref 0.8–5.3)
LYMPHOCYTES NFR BLD AUTO: 28 %
MCH RBC QN AUTO: 31.2 PG (ref 26.5–33)
MCHC RBC AUTO-ENTMCNC: 31.9 G/DL (ref 31.5–36.5)
MCV RBC AUTO: 98 FL (ref 78–100)
MICROALBUMIN UR-MCNC: 57.5 MG/L
MICROALBUMIN/CREAT UR: 58.61 MG/G CR (ref 0–25)
MONOCYTES # BLD AUTO: 1 10E3/UL (ref 0–1.3)
MONOCYTES NFR BLD AUTO: 13 %
NEUTROPHILS # BLD AUTO: 4.4 10E3/UL (ref 1.6–8.3)
NEUTROPHILS NFR BLD AUTO: 57 %
NONHDLC SERPL-MCNC: 112 MG/DL
PLATELET # BLD AUTO: 322 10E3/UL (ref 150–450)
POTASSIUM SERPL-SCNC: 3.4 MMOL/L (ref 3.4–5.3)
PROT SERPL-MCNC: 6.9 G/DL (ref 6.4–8.3)
RBC # BLD AUTO: 4.62 10E6/UL (ref 3.8–5.2)
SODIUM SERPL-SCNC: 143 MMOL/L (ref 135–145)
TRIGL SERPL-MCNC: 280 MG/DL
WBC # BLD AUTO: 7.8 10E3/UL (ref 4–11)

## 2023-11-07 PROCEDURE — 36415 COLL VENOUS BLD VENIPUNCTURE: CPT | Performed by: FAMILY MEDICINE

## 2023-11-07 PROCEDURE — 83036 HEMOGLOBIN GLYCOSYLATED A1C: CPT | Performed by: FAMILY MEDICINE

## 2023-11-07 PROCEDURE — 99214 OFFICE O/P EST MOD 30 MIN: CPT | Performed by: FAMILY MEDICINE

## 2023-11-07 PROCEDURE — 82570 ASSAY OF URINE CREATININE: CPT | Performed by: FAMILY MEDICINE

## 2023-11-07 PROCEDURE — 82043 UR ALBUMIN QUANTITATIVE: CPT | Performed by: FAMILY MEDICINE

## 2023-11-07 PROCEDURE — 80061 LIPID PANEL: CPT | Performed by: FAMILY MEDICINE

## 2023-11-07 PROCEDURE — 80053 COMPREHEN METABOLIC PANEL: CPT | Performed by: FAMILY MEDICINE

## 2023-11-07 PROCEDURE — 85025 COMPLETE CBC W/AUTO DIFF WBC: CPT | Performed by: FAMILY MEDICINE

## 2023-11-07 RX ORDER — AMITRIPTYLINE HYDROCHLORIDE 10 MG/1
10 TABLET ORAL AT BEDTIME
Qty: 90 TABLET | Refills: 1 | Status: SHIPPED | OUTPATIENT
Start: 2023-11-07 | End: 2023-12-05

## 2023-11-07 RX ORDER — AZITHROMYCIN 250 MG/1
250 TABLET, FILM COATED ORAL
Qty: 12 TABLET | Refills: 11 | Status: CANCELLED | OUTPATIENT
Start: 2023-11-08

## 2023-11-07 RX ORDER — RIZATRIPTAN BENZOATE 10 MG/1
10 TABLET, ORALLY DISINTEGRATING ORAL
Qty: 90 TABLET | Refills: 1 | Status: SHIPPED | OUTPATIENT
Start: 2023-11-07

## 2023-11-07 ASSESSMENT — ANXIETY QUESTIONNAIRES
3. WORRYING TOO MUCH ABOUT DIFFERENT THINGS: NEARLY EVERY DAY
4. TROUBLE RELAXING: NEARLY EVERY DAY
GAD7 TOTAL SCORE: 18
1. FEELING NERVOUS, ANXIOUS, OR ON EDGE: NEARLY EVERY DAY
6. BECOMING EASILY ANNOYED OR IRRITABLE: SEVERAL DAYS
5. BEING SO RESTLESS THAT IT IS HARD TO SIT STILL: NEARLY EVERY DAY
7. FEELING AFRAID AS IF SOMETHING AWFUL MIGHT HAPPEN: MORE THAN HALF THE DAYS
IF YOU CHECKED OFF ANY PROBLEMS ON THIS QUESTIONNAIRE, HOW DIFFICULT HAVE THESE PROBLEMS MADE IT FOR YOU TO DO YOUR WORK, TAKE CARE OF THINGS AT HOME, OR GET ALONG WITH OTHER PEOPLE: EXTREMELY DIFFICULT
2. NOT BEING ABLE TO STOP OR CONTROL WORRYING: NEARLY EVERY DAY
GAD7 TOTAL SCORE: 18

## 2023-11-07 ASSESSMENT — PAIN SCALES - GENERAL: PAINLEVEL: NO PAIN (0)

## 2023-11-07 NOTE — PROGRESS NOTES
Assessment & Plan   Georgette Pereira is a 63 year old female who presents today for follow-up of the following health concern  Migraine without status migrainosus, not intractable, unspecified migraine type  Headache started about 6 months ago.  She was seen 2 times in the clinic for this before and had a brain MRI did not show any acute process, but showed unchanged Volume loss and scattered nonspecific white matter T2 hyperintensities which presumably represent chronic small vessel  ischemic change  She has been taking naproxen and Tylenol with no improvement of her headache.  She denies any neurological deficits, blurred vision, speech problem.  Exam is reassuring.  Discussed with patient concern.  We will try Maxalt as needed for migraine, I also recommended using amitriptyline at bedtime discussed discussed side effects.  - rizatriptan (MAXALT-MLT) 10 MG ODT; Take 1 tablet (10 mg) by mouth at onset of headache for migraine May repeat in 2 hours. Max 3 tablets/24 hours.  - amitriptyline (ELAVIL) 10 MG tablet; Take 1 tablet (10 mg) by mouth at bedtime    Chronic kidney disease, stage 3a (H)  She tells me she takes ibuprofen almost daily for her headache  Baseline creatinine 1.34  Advised her to avoid nephrotoxic agents including NSAIDs  Check kidney function today  - Albumin Random Urine Quantitative with Creat Ratio; Future  - Comprehensive metabolic panel; Future  - CBC with Platelets & Differential; Future  - Lipid panel reflex to direct LDL Non-fasting; Future  - Albumin Random Urine Quantitative with Creat Ratio  - Comprehensive metabolic panel  - CBC with Platelets & Differential  - Lipid panel reflex to direct LDL Non-fasting    Screening for diabetes mellitus    - Hemoglobin A1c; Future  - Hemoglobin A1c    Encounter for screening mammogram for breast cancer    - MA SCREENING DIGITAL BILAT - Future  (s+30); Future    Essential hypertension with goal blood pressure less than 140/90  Blood pressure at  "goal.  Currently on metoprolol XL 50 mg, and losartan hydrochlorothiazide 100-25 mg,    Hyperlipidemia LDL goal <100  No current symptoms of chest pain  Continue high intensity statin Crestor 20 mg for stroke prevention                 Mike Cavazos MD  [facility]   Amie is a 63 year old, presenting for the following health issues:  Establish Care and Chronic Disease Management        11/7/2023    10:09 AM   Additional Questions   Roomed by Caroline   Accompanied by SELF       History of Present Illness       Back Pain:  She presents for follow up of back pain. Patient's back pain is a recurring problem.  Location of back pain:  Right lower back, left lower back, right side of neck, left side of neck, right shoulder and left shoulder  Description of back pain: burning and shooting  Back pain spreads: left shoulder, right side of neck and left side of neck    Since patient first noticed back pain, pain is: gradually worsening  Does back pain interfere with her job:  Yes       Mental Health Follow-up:  Patient presents to follow-up on Depression & Anxiety.Patient's depression since last visit has been:  Good  The patient is not having other symptoms associated with depression.  Patient's anxiety since last visit has been:  Worse  The patient is not having other symptoms associated with anxiety.  Any significant life events: financial concerns, housing concerns and health concerns  Patient is feeling anxious or having panic attacks.  Patient has no concerns about alcohol or drug use.    Headaches:   Since the patient's last clinic visit, headaches are: no change  The patient is getting headaches:  Constant  She is not able to do normal daily activities when she has a migraine.  The patient is taking the following rescue/relief medications:  No rescue/relief medications   Patient states \"I get no relief\" from the rescue/relief medications.   The patient is taking the following " medications to prevent migraines:  No medications to prevent migraines  In the past 4 weeks, the patient has gone to an Urgent Care or Emergency Room 0 times times due to headaches.She consumes 1 sweetened beverage(s) daily. She exercises with enough effort to increase her heart rate 3 or less days per week.   She is taking medications regularly.       Headache started 6 months - used to be frontal now in the back of neck   No blurred vision   Missed work due to headache   She takes ibuprofen , tylenol did not help   She was given Robaxin last visit - helped with sleep not with pain   Headache is every day  - nonspecific triggering factors   She had hx of stroke in 2021   MRI 05/2023 :  1. No acute abnormality.  2. Volume loss and scattered nonspecific white matter T2  hyperintensities which presumably represent chronic small vessel  ischemic change, unchanged.   Takes Cymbalta     Background history :    COPD -group D, no current exacerbation  Follows with pulmonology, last appointment was on 07/31/2023  PFTs demonstrated moderately severe obstruction  She is currently on Dulera, albuterol Spiriva chronic azithromycin to 250 mg 3 times per week was added by her pulmonologist  Tobacco abuse  Quit smoking 14 years ago  She continues to vape    Anxiety disorder  Taking Cymbalta  Stable  CKD  She tells me she takes ibuprofen almost daily for her headache  Baseline creatinine 1.34  Advised her to avoid nephrotoxic agents including NSAIDs  Check kidney function today  Hx of stroke   Had a stroke in 2021  No neurological deficits  Currently on full dose aspirin and high intensity statin Crestor 20 mg    Hypertension  Blood pressure at goal.  Currently on metoprolol XL 50 mg, and losartan hydrochlorothiazide 100-25 mg,  Hyperlipidemia  No current symptoms of chest pain  Continue high intensity statin Crestor 20 mg for stroke prevention  Past Medical History:   Diagnosis Date    ACL (anterior cruciate ligament) tear  "02/27/2012    Left     Cerebral infarction (H) 7/2022    Chronic obstructive pulmonary disease with acute exacerbation (H) 11/23/2014    First hospitalization 11/23/14     CKD (chronic kidney disease) stage 3, GFR 30-59 ml/min (H) 06/30/2020    CTS (carpal tunnel syndrome) 05/01/2010    Degenerative joint disease     Depressive disorder     Hypertension     Temporomandibular joint disorders, unspecified     Uncomplicated asthma      Past Surgical History:   Procedure Laterality Date    COLONOSCOPY N/A 03/11/2022    Procedure: COLONOSCOPY;  Surgeon: Trell Courtney MD;  Location:  GI    HYSTERECTOMY VAGINAL      ORTHOPEDIC SURGERY  2012    [facility] ARTHROSCOPY/SURGERY  01/01/2000     SH:    Employment:     Exercise: no - COPD symptoms   Tobacco: vape - quit smoking 14 years ago   Etoh: no   Recreational drugs: no   Caffeine: no               Review of Systems   Constitutional, HEENT, cardiovascular, pulmonary, gi and gu systems are negative, except as otherwise noted.      Objective    /79   Pulse 86   Temp 98.1  F (36.7  C) (Tympanic)   Resp 18   Ht 1.676 m (5' 6\")   Wt 59 kg (130 lb)   SpO2 100%   BMI 20.98 kg/m    Body mass index is 20.98 kg/m .  Physical Exam   GENERAL: healthy, alert and no distress  NECK: no adenopathy, no asymmetry, masses, or scars and thyroid normal to palpation  RESP: lungs clear to auscultation - no rales, rhonchi or wheezes  CV: regular rate and rhythm, normal S1 S2, no S3 or S4, no murmur, click or rub, no peripheral edema and peripheral pulses strong  ABDOMEN: soft, nontender, no hepatosplenomegaly, no masses and bowel sounds normal  MS: no gross musculoskeletal defects noted, no edema                      "

## 2023-12-04 DIAGNOSIS — J44.9 COPD, MODERATE (H): ICD-10-CM

## 2023-12-04 ASSESSMENT — ENCOUNTER SYMPTOMS
HEADACHES: 1
NERVOUS/ANXIOUS: 1
MYALGIAS: 1
BREAST MASS: 0
WEAKNESS: 1
ARTHRALGIAS: 1
SHORTNESS OF BREATH: 1
FREQUENCY: 1

## 2023-12-04 NOTE — COMMUNITY RESOURCES LIST (ENGLISH)
12/04/2023   [facility]  N/A  For questions about this resource list or additional care needs, please contact your primary care clinic or care manager.  Phone: [phone]   Email: N/A   Address: [address] [facility] [place] [address]   Hours: N/A        Hotlines and Helplines       Hotline - Housing crisis  1  [facility] Resource Line Distance: 0.31 miles      Phone/Virtual   [address] [place] [address]  Language: English  Hours: Mon - Sun Open 24 Hours   Phone: (800) 809-4001 Website: https://www.[employer]/2689/Basic-Needs     2  [facility] Distance: 17.15 miles      Phone/Virtual   [address] [place] [address]  Language: English  Hours: Mon - Sun Open 24 Hours   Phone: (435) 902-7509 Email: info@[email or web address].org Website: http://www.[facility].org          [facility]       Coordinated Entry access point  3  [facility]  Office - [facility] Distance: 8.61 miles      Phone/Virtual   [address] [address]  Language: English  Hours: Mon - Fri 8:30 AM - 12:00 PM , Mon - Fri 1:00 PM - 4:00 PM  Fees: Free   Phone: (971) 165-5748 Ext.2 Email: [email or web address].[email or web address] Website: https://[email or web address].org/usn/     4  [facility] ([facility] - Housing Services Distance: 17.57 miles      In-Person   [address] [place] [address]  Language: English  Hours: Mon - Fri 9:00 AM - 5:00 PM  Fees: Free   Phone: (268) 459-9647 Email: housing@[facility].org Website: http://www.[facility].org/housing     Drop-in center or day shelter  5  [facility] [employer] Distance: 15.68 miles      In-Person   [address] [place] [address]  Language: English, Hmong, Moroccan, Armenian  Hours: Mon - Thu 8:30 AM - 4:30 PM , Sat - Sun 9:00 AM - 12:00 PM  Fees: Free   Phone: (785) 291-2341 Email: info@[employer].org Website: https://[employer].org/     6  [facility]  [facility] Distance: 17.11 miles      In-Person   [address] [place] [address]  Language: English, Afghan, Georgian  Hours: Mon - Sat 7:00 AM - 3:00 PM  Fees: Free, Self Pay   Phone: (750) 888-8201 Email: info@[employer] Website: https://www.[employer]/locations/opportunity-center/     Housing search assistance  7  [facility] ([facility]) - [facility] Rental Housing Distance: 8.62 miles      In-Person, Phone/Virtual   [address] [place] [address]  Language: English  Hours: Mon - Fri 8:00 AM - 4:30 PM  Fees: Free   Phone: (813) 944-3037 Email: accap@[facility].org Website: http://www.accap.org     8  Neighborhood Assistance [facility] [employer] Distance: 9.82 miles      Phone/Virtual   [address] [place] [address]  Language: English, Georgian  Hours: Mon - Fri 9:00 AM - 5:00 PM  Fees: Free   Phone: (729) 554-3967 Email: services@[employer] Website: https://www.[employer]     Shelter for families  9  [facility] Distance: 19.58 miles      In-Person   [address] [place] [address]  Language: English  Hours: Mon - Fri 3:00 PM - 9:00 AM , Sat - Sun Open 24 Hours  Fees: Free   Phone: (724) 360-5265 Ext.1 Website: https://www.[employer].org/2020/07/03/emergency-family-shelter/     Shelter for individuals  10  [facility] Distance: 16.06 miles      In-Person   [address] [place] [address]  Language: English  Hours: Mon - Fri 4:00 PM - 9:00 AM  Fees: Free   Phone: (514) 716-3620 Email: nathaly@[facility].org Website: https://[facility].org/[facility]/     11  [facility] Distance: 17.52 miles      In-Person   [address] [place] [address]  Language: English  Hours: Mon - Sun Open 24 Hours  Fees: Free   Phone: (812) 804-3392 Email: communications@[facility].org  Website: https://[place].org/oursaviourshousing/          Important Numbers & Websites       Emergency Services   911  [facility]   311  Poison Control   (385) 657-7255  Suicide Prevention Lifeline   (399) 220-8951 (TALK)  Child Abuse Hotline   (607) 980-2315 (4-A-Child)  Sexual Assault Hotline   (344) 375-3030 (HOPE)  National Runaway Safeline   (680) 763-1668 (RUNAWAY)  All-Options Talkline   (549) 328-1783  Substance Abuse Referral   (316) 472-6138 (HELP)

## 2023-12-05 ENCOUNTER — OFFICE VISIT (OUTPATIENT)
Dept: FAMILY MEDICINE | Facility: CLINIC | Age: 63
End: 2023-12-05
Payer: COMMERCIAL

## 2023-12-05 ENCOUNTER — PATIENT OUTREACH (OUTPATIENT)
Dept: FAMILY MEDICINE | Facility: CLINIC | Age: 63
End: 2023-12-05

## 2023-12-05 VITALS
OXYGEN SATURATION: 98 % | HEIGHT: 66 IN | WEIGHT: 133 LBS | SYSTOLIC BLOOD PRESSURE: 144 MMHG | TEMPERATURE: 97.4 F | DIASTOLIC BLOOD PRESSURE: 88 MMHG | BODY MASS INDEX: 21.38 KG/M2 | HEART RATE: 78 BPM | RESPIRATION RATE: 16 BRPM

## 2023-12-05 DIAGNOSIS — Z12.31 ENCOUNTER FOR SCREENING MAMMOGRAM FOR BREAST CANCER: ICD-10-CM

## 2023-12-05 DIAGNOSIS — M79.7 FIBROMYALGIA: ICD-10-CM

## 2023-12-05 DIAGNOSIS — Z00.00 ROUTINE GENERAL MEDICAL EXAMINATION AT A HEALTH CARE FACILITY: Primary | ICD-10-CM

## 2023-12-05 DIAGNOSIS — B96.89 BACTERIAL VAGINOSIS: ICD-10-CM

## 2023-12-05 DIAGNOSIS — N89.8 VAGINAL ITCHING: ICD-10-CM

## 2023-12-05 DIAGNOSIS — N76.0 BACTERIAL VAGINOSIS: ICD-10-CM

## 2023-12-05 DIAGNOSIS — F43.23 ADJUSTMENT DISORDER WITH MIXED ANXIETY AND DEPRESSED MOOD: ICD-10-CM

## 2023-12-05 LAB
ALBUMIN UR-MCNC: NEGATIVE MG/DL
APPEARANCE UR: CLEAR
BILIRUB UR QL STRIP: NEGATIVE
CLUE CELLS: PRESENT
COLOR UR AUTO: YELLOW
GLUCOSE UR STRIP-MCNC: NEGATIVE MG/DL
HGB UR QL STRIP: ABNORMAL
KETONES UR STRIP-MCNC: NEGATIVE MG/DL
LEUKOCYTE ESTERASE UR QL STRIP: ABNORMAL
NITRATE UR QL: NEGATIVE
PH UR STRIP: 6 [PH] (ref 5–7)
RBC #/AREA URNS AUTO: ABNORMAL /HPF
SP GR UR STRIP: 1.02 (ref 1–1.03)
SQUAMOUS #/AREA URNS AUTO: ABNORMAL /LPF
TRICHOMONAS, WET PREP: ABNORMAL
UROBILINOGEN UR STRIP-ACNC: 0.2 E.U./DL
WBC #/AREA URNS AUTO: ABNORMAL /HPF
WBC'S/HIGH POWER FIELD, WET PREP: ABNORMAL
YEAST, WET PREP: ABNORMAL

## 2023-12-05 PROCEDURE — 90750 HZV VACC RECOMBINANT IM: CPT | Performed by: FAMILY MEDICINE

## 2023-12-05 PROCEDURE — 99396 PREV VISIT EST AGE 40-64: CPT | Mod: 25 | Performed by: FAMILY MEDICINE

## 2023-12-05 PROCEDURE — 90471 IMMUNIZATION ADMIN: CPT | Performed by: FAMILY MEDICINE

## 2023-12-05 PROCEDURE — 81001 URINALYSIS AUTO W/SCOPE: CPT | Performed by: FAMILY MEDICINE

## 2023-12-05 PROCEDURE — 87210 SMEAR WET MOUNT SALINE/INK: CPT | Performed by: FAMILY MEDICINE

## 2023-12-05 PROCEDURE — 99213 OFFICE O/P EST LOW 20 MIN: CPT | Mod: 25 | Performed by: FAMILY MEDICINE

## 2023-12-05 RX ORDER — DULOXETIN HYDROCHLORIDE 60 MG/1
60 CAPSULE, DELAYED RELEASE ORAL DAILY
Qty: 90 CAPSULE | Refills: 1 | Status: SHIPPED | OUTPATIENT
Start: 2023-12-05 | End: 2024-03-04

## 2023-12-05 RX ORDER — TIZANIDINE 2 MG/1
2 TABLET ORAL
Qty: 30 TABLET | Refills: 0 | Status: SHIPPED | OUTPATIENT
Start: 2023-12-05 | End: 2024-01-22

## 2023-12-05 ASSESSMENT — ENCOUNTER SYMPTOMS
WEAKNESS: 1
NERVOUS/ANXIOUS: 1
FREQUENCY: 1
MYALGIAS: 1
ARTHRALGIAS: 1
HEADACHES: 1
BREAST MASS: 0
SHORTNESS OF BREATH: 1

## 2023-12-05 ASSESSMENT — PAIN SCALES - GENERAL: PAINLEVEL: NO PAIN (0)

## 2023-12-05 NOTE — PROGRESS NOTES
"   SUBJECTIVE:   Amie is a 63 year old, presenting for the following:  Physical (Non fasting )        12/5/2023     9:24 AM   Additional Questions   Roomed by Caroline   Accompanied by self       Healthy Habits:     Getting at least 3 servings of Calcium per day:  NO    Bi-annual eye exam:  Yes    Dental care twice a year:  Yes    Sleep apnea or symptoms of sleep apnea:  None    Diet:  Regular (no restrictions)    Frequency of exercise:  None    Taking medications regularly:  Yes    Medication side effects:  None    Additional concerns today:  Yes    Preventive -     Immunization History   Administered Date(s) Administered    COVID-19 12+ (2023-24) (Pfizer) 10/19/2023    COVID-19 MONOVALENT 12+ (Pfizer) 05/30/2021, 06/20/2021    HepB 04/25/2013, 05/31/2013, 10/04/2013    Influenza (IIV3) PF 09/27/2010, 11/03/2011, 12/06/2012    Influenza Vaccine 18-64 (Flublok) 10/19/2023    Influenza Vaccine >6 months,quad, PF 10/04/2013, 12/18/2014    Mantoux Tuberculin Skin Test 07/19/2010    Pneumococcal 20 valent Conjugate (Prevnar 20) 10/19/2023    Pneumococcal 23 valent 12/06/2012    TDAP (Adacel,Boostrix) 10/19/2023    TDAP Vaccine (Adacel) 05/27/2010    Zoster recombinant adjuvanted (SHINGRIX) 12/05/2023         -Mammogram: last mammogram 2021   Referred         -PAP: hysterectomy     No results found for: \"PAP\"      - Colon CA screen: Colonoscopy, age 45-75 every 10 years or FIT every year or Cologuard every 3 years   Impression:            - Diverticulosis in the sigmoid colon.                          - The examination was otherwise normal on direct and                          retroflexion views.                          - No specimens collected.   Recommendation:        - High fiber diet.                          - Repeat colonoscopy in 5 years for surveillance.   Had colonoscopy 2022   Next 2027         - Lung cancer screen: IMG 2290 - Asymptomatic, age 55 - 79 years, Current or Former Smoker; if former, must have quit " in past 15 years, 30 + pack-year smoking history. Beta carotene use in smokers has been associated with higher risk of lung CA.  Follows with pulmonology   CT scheduled 02/2024   -lipids screen: done     Diabetes screen: done           Background history :     COPD -group D, no current exacerbation  Follows with pulmonology, last appointment was on 07/31/2023  PFTs demonstrated moderately severe obstruction  She is currently on Dulera, albuterol Spiriva chronic azithromycin to 250 mg 3 times per week was added by her pulmonologist  Tobacco abuse  Quit smoking 14 years ago  She continues to vape    Anxiety disorder  Taking Cymbalta  Stable  CKD  She tells me she takes ibuprofen almost daily for her headache  Baseline creatinine 1.34  Advised her to avoid nephrotoxic agents including NSAIDs  Check kidney function today  Hx of stroke   Had a stroke in 2021  No neurological deficits  Currently on full dose aspirin and high intensity statin Crestor 20 mg     Hypertension  Blood pressure at goal.  Currently on metoprolol XL 50 mg, and losartan hydrochlorothiazide 100-25 mg,  Hyperlipidemia  No current symptoms of chest pain  Continue high intensity statin Crestor 20 mg for stroke prevention  Past Medical History        Past Medical History:   Diagnosis Date    ACL (anterior cruciate ligament) tear 02/27/2012     Left     Cerebral infarction (H) 7/2022    Chronic obstructive pulmonary disease with acute exacerbation (H) 11/23/2014     First hospitalization 11/23/14     CKD (chronic kidney disease) stage 3, GFR 30-59 ml/min (H) 06/30/2020    CTS (carpal tunnel syndrome) 05/01/2010    Degenerative joint disease      Depressive disorder      Hypertension      Temporomandibular joint disorders, unspecified      Uncomplicated asthma           Past Surgical History         Past Surgical History:   Procedure Laterality Date    COLONOSCOPY N/A 03/11/2022     Procedure: COLONOSCOPY;  Surgeon: Trell Courtney MD;  Location: [facility]     HYSTERECTOMY VAGINAL        ORTHOPEDIC SURGERY   2012    [facility] ARTHROSCOPY/SURGERY   01/01/2000         SH:     Employment:              Exercise: no - COPD symptoms   Tobacco: vape - quit smoking 14 years ago   Etoh: no   Recreational drugs: no   Caffeine: no   Have you ever done Advance Care Planning? (For example, a Health Directive, POLST, or a discussion with a medical provider or your loved ones about your wishes): Yes, advance care planning is on file.    Social History     Tobacco Use    Smoking status: Former     Packs/day: 0.50     Years: 10.00     Additional pack years: 0.00     Total pack years: 5.00     Types: Cigarettes     Quit date: 12/31/2012     Years since quitting: 10.9     Passive exposure: Past    Smokeless tobacco: Current    Tobacco comments:     using nicotine replacement: e cigarattes   Substance Use Topics    Alcohol use: No             12/4/2023     8:50 AM   Alcohol Use   Prescreen: >3 drinks/day or >7 drinks/week? No     Reviewed orders with patient.  Reviewed health maintenance and updated orders accordingly - Yes  Lab work is in process  Labs reviewed in EPIC  BP Readings from Last 3 Encounters:   12/05/23 (!) 144/88   11/07/23 117/79   10/19/23 128/89    Wt Readings from Last 3 Encounters:   12/05/23 60.3 kg (133 lb)   11/07/23 59 kg (130 lb)   10/19/23 58.5 kg (129 lb)                  Patient Active Problem List   Diagnosis    Pulmonary nodules    Anxiety    Fibromyalgia    Knee pain    History of pneumonia, recurrent    Internal hemorrhoids with other complication    COPD, moderate (H)    Adult ADHD    Family history of early CAD    Hyperlipidemia LDL goal <130    Essential hypertension with goal blood pressure less than 140/90    Primary osteoarthritis involving multiple joints    Advanced directives, counseling/discussion    CKD (chronic kidney disease) stage 3, GFR 30-59 ml/min (H)    Chronic kidney disease, stage 3a (H)    Moderate recurrent major  depression (H)    History of CVA (cerebrovascular accident) without residual deficits    PAD (peripheral artery disease) (H24)     Past Surgical History:   Procedure Laterality Date    COLONOSCOPY N/A 03/11/2022    Procedure: COLONOSCOPY;  Surgeon: Trell Courtney MD;  Location: [facility]    HYSTERECTOMY VAGINAL      ORTHOPEDIC SURGERY  2012    [facility] ARTHROSCOPY/SURGERY  01/01/2000       Social History     Tobacco Use    Smoking status: Former     Packs/day: 0.50     Years: 10.00     Additional pack years: 0.00     Total pack years: 5.00     Types: Cigarettes     Quit date: 12/31/2012     Years since quitting: 10.9     Passive exposure: Past    Smokeless tobacco: Current    Tobacco comments:     using nicotine replacement: e cigarattes   Substance Use Topics    Alcohol use: No     Family History   Problem Relation Age of Onset    Cerebrovascular Disease Mother     Hypertension Mother     Heart Disease Mother     Cancer Mother         Lung    Substance Abuse Mother     Depression Mother     Cerebrovascular Disease Father     Hypertension Father     Cancer Father         Metastatic, primary lung/Prostate    Substance Abuse Father     Colon Cancer Father     Prostate Cancer Father     Substance Abuse Brother     Hypertension Brother     Depression Brother     Schizophrenia Brother     Bipolar Disorder Brother     Substance Abuse Brother     Depression Brother     Anxiety Disorder Brother     Mental Illness Brother     Breast Cancer Maternal Grandmother     Coronary Artery Disease Maternal Grandmother         MI at 41    Depression Son     Mental Illness Son     Asthma Son     Aneurysm Paternal Uncle         Brain Aneurysms    Cancer Other     Other Cancer No family hx of          Current Outpatient Medications   Medication Sig Dispense Refill    albuterol (PROVENTIL) (2.5 MG/3ML) 0.083% neb solution Take 1 vial (2.5 mg) by nebulization 4 times daily 100 mL 4    aspirin (ASA) 325 MG EC tablet Take 325 mg by mouth every  6 hours as needed      buPROPion (WELLBUTRIN XL) 150 MG 24 hr tablet Take 1 tablet (150 mg) by mouth every morning 90 tablet 0    DULERA 200-5 MCG/ACT inhaler INHALE 2 PUFFS INTO THE LUNGS TWICE DAILY 13 g 11    DULoxetine (CYMBALTA) 60 MG capsule Take 1 capsule (60 mg) by mouth daily 90 capsule 1    losartan-hydrochlorothiazide (HYZAAR) 100-25 MG tablet TAKE 1 TABLET BY MOUTH DAILY 90 tablet 1    methocarbamol (ROBAXIN) 500 MG tablet Take 1 tablet (500 mg) by mouth 3 times daily as needed for muscle spasms 30 tablet 0    metoprolol succinate ER (TOPROL XL) 50 MG 24 hr tablet TAKE 1 TABLET(50 MG) BY MOUTH DAILY 90 tablet 3    rizatriptan (MAXALT-MLT) 10 MG ODT Take 1 tablet (10 mg) by mouth at onset of headache for migraine May repeat in 2 hours. Max 3 tablets/24 hours. 90 tablet 1    rosuvastatin (CRESTOR) 20 MG tablet Take 1 tablet (20 mg) by mouth daily 90 tablet 4    tiotropium (SPIRIVA) 18 MCG inhaled capsule Inhale 1 capsule (18 mcg) into the lungs daily for 360 days 90 capsule 3    tiZANidine (ZANAFLEX) 2 MG tablet Take 1 tablet (2 mg) by mouth at bedtime as needed, may repeat once for muscle spasms 30 tablet 0    VENTOLIN  (90 Base) MCG/ACT inhaler INHALE 2 PUFFS INTO THE LUNGS EVERY 6 HOURS AS NEEDED FOR SHORTNESS OF BREATH OR DIFFICULT BREATHING OR WHEEZING 18 g 3    fluticasone-salmeterol (ADVAIR) 500-50 MCG/ACT inhaler Inhale 1 puff into the lungs every 12 hours for 90 days 3 each 11     Allergies   Allergen Reactions    Acetaminophen     Buspirone Nausea    Hydrocodone     Lisinopril Cough    Vicodin [Hydrocodone-Acetaminophen] Nausea and Vomiting     Recent Labs   Lab Test 11/07/23  1031 01/21/22  1156 06/18/21  0819 06/15/21  0824 03/28/19  1701 08/20/18  1432 08/12/16  0851 03/02/16  1217   A1C 5.7* 5.8*  --   --   --   --   --   --    LDL 56 95  --  88  --   --    < > 51   HDL 51 49*  --  55  --   --    < > 56   TRIG 280* 170*  --  240*  --   --    < > 198*   ALT 15  --   --   --   --   --    --  19   CR 1.28*  --  1.22* 1.13*   < > 1.37*   < > 0.91   GFRESTIMATED 47*  --  48* 53*   < > 40*   < > 64   GFRESTBLACK  --   --  55* 61   < > 48*   < > 77   POTASSIUM 3.4  --  4.0 3.7   < > 4.2   < > 4.6   TSH  --   --   --  1.73  --  1.12  --  0.58    < > = values in this interval not displayed.        Breast Cancer Screening:    FHS-7:       6/18/2021     6:59 AM 1/10/2022    11:05 AM 12/4/2023     8:52 AM   Breast CA Risk Assessment (FHS-7)   Did any of your first-degree relatives have breast or ovarian cancer? No No Yes   Did any of your relatives have bilateral breast cancer? Unknown Unknown Unknown   Did any man in your family have breast cancer? No No No   Did any woman in your family have breast and ovarian cancer? Yes No Yes   Did any woman in your family have breast cancer before age 50 y? Yes Yes Yes   Do you have 2 or more relatives with breast and/or ovarian cancer? Yes No Yes   Do you have 2 or more relatives with breast and/or bowel cancer? Yes No Yes     click delete button to remove this line now  Mammogram Screening: Recommended mammography every 1-2 years with patient discussion and risk factor consideration  Pertinent mammograms are reviewed under the imaging tab.    History of abnormal Pap smear: Status post benign hysterectomy. Health Maintenance and Surgical History updated.     Reviewed and updated as needed this visit by clinical staff   Tobacco  Allergies  Meds              Reviewed and updated as needed this visit by Provider                 Past Medical History:   Diagnosis Date    ACL (anterior cruciate ligament) tear 02/27/2012    Left     Cerebral infarction (H) 7/2022    Chronic obstructive pulmonary disease with acute exacerbation (H) 11/23/2014    First hospitalization 11/23/14     CKD (chronic kidney disease) stage 3, GFR 30-59 ml/min (H) 06/30/2020    CTS (carpal tunnel syndrome) 05/01/2010    Degenerative joint disease     Depressive disorder     Hypertension      "Temporomandibular joint disorders, unspecified     Uncomplicated asthma       Past Surgical History:   Procedure Laterality Date    COLONOSCOPY N/A 2022    Procedure: COLONOSCOPY;  Surgeon: Trell Courtney MD;  Location:  GI    HYSTERECTOMY VAGINAL      ORTHOPEDIC SURGERY  2012    [facility] ARTHROSCOPY/SURGERY  2000     OB History    Para Term  AB Living   3 2 0 2 1 2   SAB IAB Ectopic Multiple Live Births   1 0 0 0 2      # Outcome Date GA Lbr Markie/2nd Weight Sex Delivery Anes PTL Lv   3          ALEX   2 SAB            1          ALEX       Review of Systems   Respiratory:  Positive for shortness of breath.    Breasts:  Negative for tenderness, breast mass and discharge.   Genitourinary:  Positive for frequency, urgency and vaginal discharge. Negative for pelvic pain and vaginal bleeding.   Musculoskeletal:  Positive for arthralgias and myalgias.   Neurological:  Positive for weakness and headaches.   Psychiatric/Behavioral:  The patient is nervous/anxious.           OBJECTIVE:   BP (!) 144/88   Pulse 78   Temp 97.4  F (36.3  C) (Tympanic)   Resp 16   Ht 1.676 m (5' 6\")   Wt 60.3 kg (133 lb)   SpO2 98%   BMI 21.47 kg/m    Physical Exam  GENERAL: healthy, alert and no distress  NECK: no adenopathy, no asymmetry, masses, or scars and thyroid normal to palpation  RESP: lungs clear to auscultation - no rales, rhonchi or wheezes  CV: regular rate and rhythm, normal S1 S2, no S3 or S4, no murmur, click or rub, no peripheral edema and peripheral pulses strong  ABDOMEN: soft, nontender, no hepatosplenomegaly, no masses and bowel sounds normal  MS: no gross musculoskeletal defects noted, no edema        ASSESSMENT/PLAN:   (Z00.00) Routine general medical examination at a health care facility  (primary encounter diagnosis)  Comment: Preventive care reviewed and updated.      (M79.7) Fibromyalgia  Generalized muscle and joint pain.  Previous diagnosed with fibromyalgia, " has been on Cymbalta with control of symptoms, has not been taking it for several months after she ran out of medication.  Discussed with patient and requested.  We will restart Cymbalta, Zanaflex as needed to help with muscle pain        (ZANAFLEX) 2 MG tablet          (F43.23) Adjustment disorder with mixed anxiety and depressed mood  Continue Cymbalta  Plan: DULoxetine (CYMBALTA) 60 MG capsule         (N89.8) Vaginal itching  Vaginal itching and urinary frequency started few days ago.  She is not sexually active no vaginal discharge.  Check UA, and wet prep  Plan: UA with Microscopic reflex to Culture - lab         collect, Wet prep - lab collect, UA Microscopic        with Reflex to Culture          (Z12.31) Encounter for screening mammogram for breast cancer    Plan: *MA Screening Digital Bilateral            Patient has been advised of split billing requirements and indicates understanding: Yes      COUNSELING:  Reviewed preventive health counseling, as reflected in patient instructions       Regular exercise       Healthy diet/nutrition       Immunizations  Vaccinated for: Zoster          She reports that she quit smoking about 10 years ago. Her smoking use included cigarettes. She has a 5.00 pack-year smoking history. She has been exposed to tobacco smoke. She uses smokeless tobacco.          Mike Cavazos MD  [facility]

## 2023-12-05 NOTE — TELEPHONE ENCOUNTER
Patient Quality Outreach    Patient is due for the following:   Breast Cancer Screening - Mammogram    Next Steps:   No follow up needed at this time.    Type of outreach:    Orders placed at appt today       Questions for provider review:    None           Caroline Calabrese, CMA

## 2023-12-06 RX ORDER — METRONIDAZOLE 500 MG/1
500 TABLET ORAL 2 TIMES DAILY
Qty: 14 TABLET | Refills: 0 | Status: SHIPPED | OUTPATIENT
Start: 2023-12-06 | End: 2023-12-13

## 2023-12-06 RX ORDER — ALBUTEROL SULFATE 90 UG/1
AEROSOL, METERED RESPIRATORY (INHALATION)
Qty: 18 G | Refills: 3 | Status: SHIPPED | OUTPATIENT
Start: 2023-12-06

## 2024-01-15 DIAGNOSIS — F41.9 ANXIETY: Chronic | ICD-10-CM

## 2024-01-16 RX ORDER — BUPROPION HYDROCHLORIDE 150 MG/1
150 TABLET ORAL EVERY MORNING
Qty: 90 TABLET | Refills: 0 | Status: SHIPPED | OUTPATIENT
Start: 2024-01-16 | End: 2024-05-06 | Stop reason: DRUGHIGH

## 2024-01-22 ENCOUNTER — MYC REFILL (OUTPATIENT)
Dept: FAMILY MEDICINE | Facility: CLINIC | Age: 64
End: 2024-01-22
Payer: COMMERCIAL

## 2024-01-22 DIAGNOSIS — M79.7 FIBROMYALGIA: ICD-10-CM

## 2024-01-22 RX ORDER — TIZANIDINE 2 MG/1
2 TABLET ORAL
Qty: 30 TABLET | Refills: 0 | Status: SHIPPED | OUTPATIENT
Start: 2024-01-22 | End: 2024-03-04

## 2024-02-29 ASSESSMENT — PATIENT HEALTH QUESTIONNAIRE - PHQ9
10. IF YOU CHECKED OFF ANY PROBLEMS, HOW DIFFICULT HAVE THESE PROBLEMS MADE IT FOR YOU TO DO YOUR WORK, TAKE CARE OF THINGS AT HOME, OR GET ALONG WITH OTHER PEOPLE: SOMEWHAT DIFFICULT
SUM OF ALL RESPONSES TO PHQ QUESTIONS 1-9: 11
SUM OF ALL RESPONSES TO PHQ QUESTIONS 1-9: 11

## 2024-03-01 ENCOUNTER — OFFICE VISIT (OUTPATIENT)
Dept: FAMILY MEDICINE | Facility: CLINIC | Age: 64
End: 2024-03-01

## 2024-03-01 VITALS
BODY MASS INDEX: 19.44 KG/M2 | HEIGHT: 66 IN | WEIGHT: 121 LBS | TEMPERATURE: 96.7 F | HEART RATE: 100 BPM | DIASTOLIC BLOOD PRESSURE: 86 MMHG | OXYGEN SATURATION: 94 % | SYSTOLIC BLOOD PRESSURE: 120 MMHG

## 2024-03-01 DIAGNOSIS — J44.9 COPD, MODERATE (H): ICD-10-CM

## 2024-03-01 DIAGNOSIS — N18.31 CHRONIC KIDNEY DISEASE, STAGE 3A (H): ICD-10-CM

## 2024-03-01 DIAGNOSIS — Z09 HOSPITAL DISCHARGE FOLLOW-UP: Primary | ICD-10-CM

## 2024-03-01 DIAGNOSIS — I10 ESSENTIAL HYPERTENSION WITH GOAL BLOOD PRESSURE LESS THAN 140/90: ICD-10-CM

## 2024-03-01 DIAGNOSIS — F33.1 MODERATE RECURRENT MAJOR DEPRESSION (H): ICD-10-CM

## 2024-03-01 DIAGNOSIS — F17.200 NICOTINE DEPENDENCE, UNCOMPLICATED, UNSPECIFIED NICOTINE PRODUCT TYPE: ICD-10-CM

## 2024-03-01 DIAGNOSIS — I73.9 PAD (PERIPHERAL ARTERY DISEASE) (H): ICD-10-CM

## 2024-03-01 DIAGNOSIS — J18.9 PNEUMONIA DUE TO INFECTIOUS ORGANISM, UNSPECIFIED LATERALITY, UNSPECIFIED PART OF LUNG: ICD-10-CM

## 2024-03-01 DIAGNOSIS — E78.5 HYPERLIPIDEMIA LDL GOAL <100: ICD-10-CM

## 2024-03-01 PROCEDURE — 99214 OFFICE O/P EST MOD 30 MIN: CPT | Performed by: NURSE PRACTITIONER

## 2024-03-01 RX ORDER — LOSARTAN POTASSIUM AND HYDROCHLOROTHIAZIDE 25; 100 MG/1; MG/1
1 TABLET ORAL DAILY
Qty: 90 TABLET | Refills: 1 | Status: SHIPPED | OUTPATIENT
Start: 2024-03-01 | End: 2024-07-24

## 2024-03-01 RX ORDER — NICOTINE 21 MG/24HR
1 PATCH, TRANSDERMAL 24 HOURS TRANSDERMAL EVERY 24 HOURS
Qty: 42 PATCH | Refills: 0 | Status: SHIPPED | OUTPATIENT
Start: 2024-03-01 | End: 2024-04-12

## 2024-03-01 RX ORDER — GUAIFENESIN 200 MG/10ML
200 LIQUID ORAL EVERY 4 HOURS PRN
COMMUNITY
Start: 2024-02-27

## 2024-03-01 RX ORDER — NICOTINE 21 MG/24HR
1 PATCH, TRANSDERMAL 24 HOURS TRANSDERMAL EVERY 24 HOURS
Qty: 14 PATCH | Refills: 0 | Status: SHIPPED | OUTPATIENT
Start: 2024-04-12 | End: 2024-04-26

## 2024-03-01 RX ORDER — DIAZEPAM 10 MG
5-10 TABLET ORAL DAILY PRN
COMMUNITY
Start: 2024-02-27 | End: 2024-07-24

## 2024-03-01 RX ORDER — LEVOFLOXACIN 750 MG/1
750 TABLET, FILM COATED ORAL
COMMUNITY
Start: 2024-02-27 | End: 2024-03-08

## 2024-03-01 RX ORDER — ROSUVASTATIN CALCIUM 20 MG/1
20 TABLET, COATED ORAL DAILY
Qty: 90 TABLET | Refills: 1 | Status: SHIPPED | OUTPATIENT
Start: 2024-03-01 | End: 2024-07-24

## 2024-03-01 RX ORDER — BUSPIRONE HYDROCHLORIDE 10 MG/1
10 TABLET ORAL 2 TIMES DAILY
COMMUNITY
End: 2024-03-04

## 2024-03-01 RX ORDER — PREDNISONE 20 MG/1
20 TABLET ORAL DAILY
COMMUNITY
Start: 2024-02-27 | End: 2024-03-08

## 2024-03-01 RX ORDER — METOPROLOL SUCCINATE 50 MG/1
50 TABLET, EXTENDED RELEASE ORAL DAILY
Qty: 90 TABLET | Refills: 1 | Status: SHIPPED | OUTPATIENT
Start: 2024-03-01 | End: 2024-07-24

## 2024-03-01 ASSESSMENT — PAIN SCALES - GENERAL: PAINLEVEL: SEVERE PAIN (6)

## 2024-03-01 NOTE — PROGRESS NOTES
Assessment & Plan     Hospital discharge follow-up    Pneumonia due to infectious organism, unspecified laterality, unspecified part of lung  She is taking antibiotics prescribed at discharge but forgot about the prednisone and hasn't been taking it.  She will start the prednisone when she gets home.    Follow-up if not improving.       COPD, moderate (H)  She quit smoking 14 years about but states she vapes constantly.  She is interested in cessation with nicotine patch- prescribed today.   She follows with pulmonology.       Moderate recurrent major depression (H)  Stable on Cymbalta, refill not needed at this time.        PAD (peripheral artery disease) (H24)  Denies symptoms.  She is on ASA and Crestor.  She will work on nicotine cessation.      Chronic kidney disease, stage 3a (H)  Baseline GFR appears to be between 40-50.  This was stable at the time of hospital discharge.      Essential hypertension with goal blood pressure less than 140/90  Chronic, stable.  Needs refills on chronic meds.   - metoprolol succinate ER (TOPROL XL) 50 MG 24 hr tablet; Take 1 tablet (50 mg) by mouth daily  - losartan-hydrochlorothiazide (HYZAAR) 100-25 MG tablet; Take 1 tablet by mouth daily    Hyperlipidemia LDL goal <100  Chronic, stable.  Needs refills on chronic meds.   - rosuvastatin (CRESTOR) 20 MG tablet; Take 1 tablet (20 mg) by mouth daily    Nicotine dependence, uncomplicated, unspecified nicotine product type  See #2.   - nicotine (NICODERM CQ) 21 MG/24HR 24 hr patch; Place 1 patch onto the skin every 24 hours for 42 days  - nicotine (NICODERM CQ) 14 MG/24HR 24 hr patch; Place 1 patch onto the skin every 24 hours for 14 days  - nicotine (NICODERM CQ) 7 MG/24HR 24 hr patch; Place 1 patch onto the skin every 24 hours for 14 days        MED REC REQUIRED  Post Medication Reconciliation Status:  Discharge medications reconciled and changed, see notes/orders        Subjective   Amie is a 63 year old, presenting for the  following health issues:  Hospital F/U        3/1/2024     6:57 AM   Additional Questions   Roomed by trish PEREZ         Hospital Follow-up Visit:    Hospital/Nursing Home/IP Rehab Facility:  [facility]  Date of Admission: 2/24/2024  Date of Discharge: 2/27/2024  Reason(s) for Admission: Pneumonia due to infectious organism, unspecified laterality, unspecified part of lung (Primary Dx);   Acute respiratory failure with hypoxia (HC);   Sepsis, due to unspecified organism, unspecified whether acute organ dysfunction present (HC);   Anxiety     Was your hospitalization related to COVID-19? No   Problems taking medications regularly:  None  Medication changes since discharge: None  Problems adhering to non-medication therapy:  None    Summary of hospitalization:  CareEverywhere information obtained and reviewed      Hospital Course  Georgette Pereira is a 63 y.o. female with a past medical history significant for COPD, chronic kidney disease III who presented with shortness of breath with fever and productive cough.  She has been using inhalers at home but was not getting significant relief. EMS was called and when they arrived her oxygen levels were in the 70's, RR 46's. They gave her oxygen and steroids and transported here.    On arrival here; she was placed on BIPAP.  Vitals with tachycardia, tachypnea and fever.  Labs with leucocytosis and lactic acidosis. Procal 8.3  Chest radiograph noted multifocal pneumonia.  IV antibiotics and sepsis mgt initiated with eventual resolution of lactic acidosis. Hypoxia has also resolved albeit with brief need for 02 nasal canula prior to being on room air.    She also had assoc COPD exacerbation and needed IV steroids. Prolonged tapering with oral prednisone planned at discharge.  She is doing well on room air with adequate 02 saturation.    She will complete oral antibiotics at home.    START taking these medicines    Instructions  dextromethorphan-guaiFENesin (10-100mg/5mL)  "syrup  For diagnoses: Pneumonia due to infectious organism, unspecified laterality, unspecified part of lung  Commonly known as: ROBITUSSIN DM  Take 10 mL by mouth every 4 hours if needed for Cough.    levoFLOXacin 750 mg tablet  For diagnoses: Pneumonia due to infectious organism, unspecified laterality, unspecified part of lung  Commonly known as: LEVAQUIN  Take 1 Tablet (750 mg) by mouth every 48 hours for 5 doses.    * predniSONE 20 mg tablet  For diagnoses: Acute respiratory failure with hypoxia (HC)  Commonly known as: DELTASONE  Take 2 Tablets (40 mg) by mouth once daily for 5 days. THEN take 1 tablet daily for 5 days.    * predniSONE 20 mg tablet  For diagnoses: Acute respiratory failure with hypoxia (HC)  Start taking on: March 3, 2024  Commonly known as: DELTASONE  Take 1 Tablet (20 mg) by mouth once daily for 5 days.          Diagnostic Tests/Treatments reviewed.  Follow up needed: none  Other Healthcare Providers Involved in Patient s Care:         None  Update since discharge: improved.     Improved but still feeling tired, sob and wheezing. No fevers.  Didn't know she was supposed to be taking prednisone so hasn't been.  She confirms she has been taking the Levaquin.      Plan of care communicated with patient       Review of Systems  Constitutional, neuro, ENT, endocrine, pulmonary, cardiac, gastrointestinal, genitourinary, musculoskeletal, integument and psychiatric systems are negative, except as otherwise noted.      Objective    /86   Pulse 100   Temp (!) 96.7  F (35.9  C)   Ht 1.676 m (5' 6\")   Wt 54.9 kg (121 lb)   SpO2 94%   BMI 19.53 kg/m    Body mass index is 19.53 kg/m .  Physical Exam   GENERAL: alert and no distress  EYES: Eyes grossly normal to inspection, PERRL and conjunctivae and sclerae normal  NECK: no adenopathy, no asymmetry, masses, or scars  RESP: a few scattered expiratory wheezes, otherwise clear.  Breathing is unlabored  CV: regular rate and rhythm, normal S1 S2, " no S3 or S4, no murmur, click or rub, no peripheral edema  MS: no gross musculoskeletal defects noted, no edema    Labs and imaging reviewed in Care Everywhere- BMP, CBC, CXR        Signed Electronically by: Marguerite Garcia CNP    Answers submitted by the patient for this visit:  Patient Health Questionnaire (Submitted on 2/29/2024)  If you checked off any problems, how difficult have these problems made it for you to do your work, take care of things at home, or get along with other people?: Somewhat difficult  PHQ9 TOTAL SCORE: 11

## 2024-03-01 NOTE — PATIENT INSTRUCTIONS
Nicotine Transdermal System   Habitrol, Nicoderm C-Q    Uses  For quitting smoking.    Instructions  DO NOT take this medicine by mouth.    Avoid placing the patch near the breast.    Remove the patch after 24 hours.    Keep the medicine at room temperature. Avoid heat and direct light.    This patch should not be cut.    Wash your hands before and after handling this medicine.    Remove old patch before applying new one. Change the location of the new patch.    If you have vivid dreams or trouble sleeping, you may remove the patch before going to sleep.    Ask your doctor or pharmacist about locations on your body where this patch can be used.    Remove the plastic liner that protects the sticky side of the patch before applying to the skin.    Be sure the area of skin is clean and dry before putting on a new patch.    Apply the patch to a clean, dry, hairless area.    Press the patch firmly for a few seconds to make sure it stays in place.    After removing the patch, fold it together and discard it out of reach of children and pets.    Please ask your doctor or pharmacist how you can safely dispose of used patches.    If the skin under the patch becomes irritated, remove the patch. Do not apply a new patch to the area until the skin feels better.    To avoid irritating your skin, use a different location for a new patch.    Apply the patch only to normal looking skin. Avoid areas of the skin that are red, have scrapes, or damaged.    If the patch falls off, apply a new a patch on a different location of the body.    Please tell your doctor and pharmacist about all the medicines you take. Include both prescription and over-the-counter medicines. Also tell them about any vitamins, herbal medicines, or anything else you take for your health.    If you need to stop this medicine, your doctor may wish to gradually reduce the dosage before stopping.    Do not use more than 1 patch at any one time.    Cautions  Tell  your doctor and pharmacist if you ever had an allergic reaction to a medicine. Symptoms of an allergic reaction can include trouble breathing, skin rash, itching, swelling, or severe dizziness.    Do not use the medication any more than instructed.    Avoid smoking while on this medicine. Smoking may increase your risk for stroke, heart attack, blood clots, high blood pressure, and other diseases of the heart and blood vessels.    Tell the doctor or pharmacist if you are pregnant, planning to be pregnant, or breastfeeding.    Ask your pharmacist if this medicine can interact with any of your other medicines. Be sure to tell them about all the medicines you take.    Please tell all your doctors and dentists that you are on this medicine before they provide care.    Side Effects  The following is a list of some common side effects from this medicine. Please speak with your doctor about what you should do if you experience these or other side effects.    skin irritation where medicine is applied    If you have any of the following side effects, you may be getting too much medicine. Please contact your doctor to let them know about these side effects.    diarrhea  dizziness  nausea  rapid heartbeat  vomiting    A few people may have an allergic reaction to this medicine. Symptoms can include difficulty breathing, skin rash, itching, swelling, or severe dizziness. If you notice any of these symptoms, seek medical help quickly.    Extra  Please speak with your doctor, nurse, or pharmacist if you have any questions about this medicine.      [email or web address]  IMPORTANT NOTE: This document tells you briefly how to take your medicine, but it does not tell you all there is to know about it. Your doctor or pharmacist may give you other documents about your medicine. Please talk to them if you have any questions. Always follow their advice. There is a more complete description of this medicine  available in English. Scan this code on your smartphone or tablet or use the web address below. You can also ask your pharmacist for a printout. If you have any questions, please ask your pharmacist.   2021 [employer].      4371-0720 [employer]. All rights reserved. This information is not intended as a substitute for professional medical care. Always follow your healthcare professional's instructions.

## 2024-03-04 ENCOUNTER — MYC REFILL (OUTPATIENT)
Dept: FAMILY MEDICINE | Facility: CLINIC | Age: 64
End: 2024-03-04

## 2024-03-04 ENCOUNTER — MYC MEDICAL ADVICE (OUTPATIENT)
Dept: FAMILY MEDICINE | Facility: CLINIC | Age: 64
End: 2024-03-04

## 2024-03-04 DIAGNOSIS — F41.9 ANXIETY: Chronic | ICD-10-CM

## 2024-03-04 DIAGNOSIS — F43.23 ADJUSTMENT DISORDER WITH MIXED ANXIETY AND DEPRESSED MOOD: ICD-10-CM

## 2024-03-04 DIAGNOSIS — M79.7 FIBROMYALGIA: ICD-10-CM

## 2024-03-05 RX ORDER — TIZANIDINE 2 MG/1
2 TABLET ORAL
Qty: 30 TABLET | Refills: 0 | Status: SHIPPED | OUTPATIENT
Start: 2024-03-05 | End: 2024-04-30

## 2024-03-05 RX ORDER — DULOXETIN HYDROCHLORIDE 60 MG/1
60 CAPSULE, DELAYED RELEASE ORAL DAILY
Qty: 90 CAPSULE | Refills: 1 | Status: SHIPPED | OUTPATIENT
Start: 2024-03-05 | End: 2024-07-24

## 2024-03-05 RX ORDER — BUSPIRONE HYDROCHLORIDE 10 MG/1
10 TABLET ORAL 2 TIMES DAILY
Qty: 180 TABLET | Refills: 1 | Status: SHIPPED | OUTPATIENT
Start: 2024-03-05 | End: 2024-07-24

## 2024-03-05 RX ORDER — BUPROPION HYDROCHLORIDE 150 MG/1
150 TABLET ORAL EVERY MORNING
Qty: 90 TABLET | Refills: 0 | OUTPATIENT
Start: 2024-03-05

## 2024-03-05 NOTE — TELEPHONE ENCOUNTER
I can give her a return to work note for whenever she needs.  If she feels comfortable going back to work on Wednesday that is fine.  Otherwise if she doesn't think she is up for it yet, we could have her return later in the week or next Monday- this would really be based on how she's feeling and if she thinks she could perform job. Marguerite Garcia, CNP

## 2024-03-05 NOTE — TELEPHONE ENCOUNTER
Refills being taken care of in a different encounter by our refill team.   Routing patients request to return to work to PCP to advise.     Krystle CHAMPAGNEN, RN

## 2024-03-13 ENCOUNTER — MYC MEDICAL ADVICE (OUTPATIENT)
Dept: FAMILY MEDICINE | Facility: CLINIC | Age: 64
End: 2024-03-13

## 2024-03-13 NOTE — TELEPHONE ENCOUNTER
Routing to provider to review and advise, see MyChart message below.  See also previous Tabber messages in Chart Review from 3/4/24 for additional information.

## 2024-03-13 NOTE — LETTER
March 13, 2024      Georgette Pereira  [address]   [place] [address]        To Whom It May Concern:      Georgette Pereira was seen in our clinic. She may return to work without restrictions.      Sincerely,        Marguerite Garcia, CNP

## 2024-03-13 NOTE — TELEPHONE ENCOUNTER
Letter created for patient to access in [employer] Marguerite Garcia, CNP

## 2024-03-28 ENCOUNTER — PATIENT OUTREACH (OUTPATIENT)
Dept: FAMILY MEDICINE | Facility: CLINIC | Age: 64
End: 2024-03-28

## 2024-03-28 NOTE — TELEPHONE ENCOUNTER
Patient Quality Outreach    Patient is due for the following:   Breast Cancer Screening - Mammogram    Next Steps:   Schedule a office visit for mammo    Type of outreach:    Sent judge.me message.      Questions for provider review:    None           Caroline Calabrese, CMA

## 2024-04-30 DIAGNOSIS — M79.7 FIBROMYALGIA: ICD-10-CM

## 2024-04-30 RX ORDER — TIZANIDINE 2 MG/1
TABLET ORAL
Qty: 30 TABLET | Refills: 0 | Status: SHIPPED | OUTPATIENT
Start: 2024-04-30 | End: 2024-05-31

## 2024-05-06 ENCOUNTER — OFFICE VISIT (OUTPATIENT)
Dept: FAMILY MEDICINE | Facility: CLINIC | Age: 64
End: 2024-05-06
Payer: COMMERCIAL

## 2024-05-06 VITALS
RESPIRATION RATE: 17 BRPM | DIASTOLIC BLOOD PRESSURE: 73 MMHG | BODY MASS INDEX: 19.77 KG/M2 | SYSTOLIC BLOOD PRESSURE: 108 MMHG | HEART RATE: 71 BPM | OXYGEN SATURATION: 99 % | TEMPERATURE: 98.3 F | HEIGHT: 66 IN | WEIGHT: 123 LBS

## 2024-05-06 DIAGNOSIS — F17.200 NICOTINE DEPENDENCE, UNCOMPLICATED, UNSPECIFIED NICOTINE PRODUCT TYPE: ICD-10-CM

## 2024-05-06 DIAGNOSIS — F32.A DEPRESSION, UNSPECIFIED DEPRESSION TYPE: ICD-10-CM

## 2024-05-06 DIAGNOSIS — L60.8 BLACK NAILS: Primary | ICD-10-CM

## 2024-05-06 DIAGNOSIS — F43.23 ADJUSTMENT DISORDER WITH MIXED ANXIETY AND DEPRESSED MOOD: ICD-10-CM

## 2024-05-06 DIAGNOSIS — L81.9 DISCOLORATION OF SKIN OF TOE: ICD-10-CM

## 2024-05-06 DIAGNOSIS — K13.21 LEUKOPLAKIA OF GINGIVA: ICD-10-CM

## 2024-05-06 PROCEDURE — 99213 OFFICE O/P EST LOW 20 MIN: CPT | Performed by: PHYSICIAN ASSISTANT

## 2024-05-06 RX ORDER — BUPROPION HYDROCHLORIDE 300 MG/1
300 TABLET ORAL EVERY MORNING
Qty: 90 TABLET | Refills: 0 | Status: SHIPPED | OUTPATIENT
Start: 2024-05-06 | End: 2024-07-24

## 2024-05-06 ASSESSMENT — PAIN SCALES - GENERAL: PAINLEVEL: MODERATE PAIN (4)

## 2024-05-06 NOTE — PROGRESS NOTES
Assessment & Plan     Leukoplakia of gingiva  Suspect leukoplakia  Needs bx  - Adult ENT  Referral; Future    Black nails  Suspect melanoma  Needs bx  - Adult Dermatology  Referral; Future    Discoloration of the Middle left toe  R/O vascular causes, chilblains  Consider autoimmune causes (f/U with PCP)    Nicotine dependence  Anxiety and Depression  -refilled bupropion    Subjective   Amie is a 63 year old, presenting for the following health issues:  Derm Problem        5/6/2024     9:31 AM   Additional Questions   Roomed by Mandie     Amie is a very pleasant 63 year old female who presents to clinic for multiple concerns:    1) Black toenail: 3 months ago Amie noticed that her left great toenail was turning black. Now, the entire nail is black. There was no injury to that toe. She wants to make sure it is not melanoma.     2) Left Middle toe turning purple and painful the other day and a vein ran up her foot from the toe. It is painful it a blanket touches it but the purple color has almost resolved.     3) She has a sore on the left upper front gum line between the teeth and the lips. She saw the dentist and he thought is was due to ill-fitting dentures, but the area has not healed, has spread and is not painful unless she inserts her teeth incorrectly. No bleeding from the area.     She needs a refill of the antidepressant medication she is taking. She has no concerns regarding this issue, just needs a refill.     History of Present Illness       Reason for visit:  Melanoma check  Symptom onset:  More than a month    She eats 2-3 servings of fruits and vegetables daily.She consumes 1 sweetened beverage(s) daily.She exercises with enough effort to increase her heart rate 10 to 19 minutes per day.  She exercises with enough effort to increase her heart rate 3 or less days per week.   She is taking medications regularly.     Left foot - concerned about her toes - great toe and 3rd toe that have  "turned black in color x 2 months.     Lesion in mouth x 3 month, not healing and getting larger    Concerned for cancer in both spots.       ROS: as per HPI. Has no colds, fevers, chest pain. Has chronic SOB due to COPD.         Objective    /73   Pulse 71   Temp 98.3  F (36.8  C) (Oral)   Resp 17   Ht 1.676 m (5' 6\")   Wt 55.8 kg (123 lb)   SpO2 99%   BMI 19.85 kg/m    Body mass index is 19.85 kg/m .  Physical Exam  Vitals reviewed.   Constitutional:       General: She is not in acute distress.  HENT:      Mouth/Throat:      Dentition: Gum lesions present.      Pharynx: Uvula midline. No pharyngeal swelling, oropharyngeal exudate or uvula swelling.      Tonsils: No tonsillar exudate.        Comments: Large white ulcerated area without surrounding erythema or bleeding as noted on graph.   Pulmonary:      Effort: Pulmonary effort is normal.   Skin:     Comments: Left great toenail is black.   Left middle toe appears mildly reddish purple, but has normal cap refill, mild TTP, patient can move the toe, there are no \"veins\" radiating from the toe today, no sign of an abscess and skin is intact. DP pulse is 2+.    Neurological:      Mental Status: She is alert and oriented to person, place, and time.   Psychiatric:         Mood and Affect: Mood normal.         Speech: Speech normal.         Thought Content: Thought content normal.          Signed Electronically by: TEOFILO MURRAY PA-C    "

## 2024-05-07 ENCOUNTER — MYC MEDICAL ADVICE (OUTPATIENT)
Dept: DERMATOLOGY | Facility: CLINIC | Age: 64
End: 2024-05-07
Payer: COMMERCIAL

## 2024-05-08 ENCOUNTER — TELEPHONE (OUTPATIENT)
Dept: DERMATOLOGY | Facility: CLINIC | Age: 64
End: 2024-05-08
Payer: COMMERCIAL

## 2024-05-08 ENCOUNTER — TELEPHONE (OUTPATIENT)
Dept: OTOLARYNGOLOGY | Facility: CLINIC | Age: 64
End: 2024-05-08
Payer: COMMERCIAL

## 2024-05-08 NOTE — TELEPHONE ENCOUNTER
Spoke with patient and scheduled for 6/20 in [place] with Dr. Biswas.    Tracie Balderas RN Care Coordinator, [facility] 05/08/24 12:04 PM

## 2024-05-08 NOTE — TELEPHONE ENCOUNTER
This encounter is being sent to inform the clinic that this patient has a referral from Shelly Finley for the diagnoses of K13.21 (ICD-10-CM) - Leukoplakia of gingiva  and has requested that this patient be seen within 1-2 weeks and/or with unknow.  Based on the availability of our provider(s), we are unable to accommodate this request.    Were all sites offered this patient?  Yes    Does scheduling algorithm request to schedule next available?  Patient has been scheduled for the first available opening with Dr Biswas on 10/24/24.  We have informed the patient that the clinic will review their referral and reach out if a sooner appointment is medically necessary.

## 2024-05-08 NOTE — TELEPHONE ENCOUNTER
Pt called back. Scheduled her for 9.19.24, the first available. The referral priority is 1-2 weeks, Please review to see if she can be brought in sooner. Thanks.

## 2024-05-08 NOTE — TELEPHONE ENCOUNTER
Left voicemail with callback number to attempt to schedule patient sooner in [place] if she is willing.    Tracie Balderas RN Care Coordinator, [facility] 05/08/24 11:04 AM

## 2024-05-09 NOTE — TELEPHONE ENCOUNTER
Per Dr. Hopkins schedule with derm surg for probably nail biopsy.     Kenyatta Kaur RN on 5/9/2024 at 9:07 AM

## 2024-05-10 NOTE — TELEPHONE ENCOUNTER
I spoke with Geena and scheduled her for nail biopsy on 6/13/24.  I asked her to set aside 2 hr that she may be here in the office.  She was instructed to wash with chlorhexidine the night before and morning of the procedure and to wear a shoe/sandal that is easy to slip on and off and that will accommodate a bulky bandage.    She is a  and will not be working at the time of the appt.    Medication & Allergy Information                                                      Review and update allergy and medication list.    Do you take the following medications:  Coumadin, Eliquis, Pradaxa, Xarelto:  NO    Past Medical History                                                    Do you currently or have you previously had any of the following conditions:    Hepatitis:  NO  HIV/AIDS:  NO  Prolonged bleeding or bleeding disorder:  NO  Pacemaker or Defibrillator:  NO.    History of artificial or heart valve replacement:  NO  Endocarditis (inflammation of the inner lining of the heart's chambers and valves):  NO  Have you ever had a prosthetic joint infection:  NO  Pregnant or Breastfeeding:  NO  Mobility device (wheelchair, transfer difficulty): NO    Important Reminders:                                                      Ok to take all of their medications as prescribed  Patients can eat, no need to be fasting  Patient will not be able to get the site wet for 48 hrs  No submerging wound in standing water (lake, pool, bathtub, hot tub) for 2 weeks  No physical activity for 48 hrs (further restrictions will be discussed by MD at time of visit)    Kerrie Lee RN

## 2024-05-16 NOTE — PROGRESS NOTES
ENT Consultation    Georgette Pereira who is a 63 year old female seen in consultation at the request of Shelly Finley PA-C.      History of Present Illness - Georgette Pereira is a 63 year old female presents with a chief complaint of sore in her mouth for the last 3 to 4 months.  It hurts and is getting larger.  It was initially diagnosed a canker sore.  The patient was a smoker quit 14 years ago but until recently was vaping.  She apparently had some teeth extraction on the left side and since then has had some numbness of the gum itself in the lower portion of the nose.  Patient is edentulous with upper gums and wears full dentures.          BP Readings from Last 1 Encounters:   05/20/24 112/80       BP noted to be well controlled today in office.     Georgette IS NOT a smoker/uses chewing tobacco.        Past Medical History -   Past Medical History:   Diagnosis Date    ACL (anterior cruciate ligament) tear 02/27/2012    Left     Cerebral infarction (H) 7/2022    Chronic obstructive pulmonary disease with acute exacerbation (H) 11/23/2014    First hospitalization 11/23/14     CKD (chronic kidney disease) stage 3, GFR 30-59 ml/min (H) 06/30/2020    CTS (carpal tunnel syndrome) 05/01/2010    Degenerative joint disease     Depressive disorder     Hypertension     Temporomandibular joint disorders, unspecified     Uncomplicated asthma        Current Medications -   Current Outpatient Medications:     albuterol (PROVENTIL) (2.5 MG/3ML) 0.083% neb solution, Take 1 vial (2.5 mg) by nebulization 4 times daily, Disp: 100 mL, Rfl: 4    aspirin (ASA) 325 MG EC tablet, Take 325 mg by mouth every 6 hours as needed, Disp: , Rfl:     buPROPion (WELLBUTRIN XL) 300 MG 24 hr tablet, Take 1 tablet (300 mg) by mouth every morning, Disp: 90 tablet, Rfl: 0    busPIRone (BUSPAR) 10 MG tablet, Take 1 tablet (10 mg) by mouth 2 times daily, Disp: 180 tablet, Rfl: 1    DULoxetine (CYMBALTA) 60 MG capsule, Take 1 capsule (60 mg) by  mouth daily, Disp: 90 capsule, Rfl: 1    losartan-hydrochlorothiazide (HYZAAR) 100-25 MG tablet, Take 1 tablet by mouth daily, Disp: 90 tablet, Rfl: 1    metoprolol succinate ER (TOPROL XL) 50 MG 24 hr tablet, Take 1 tablet (50 mg) by mouth daily, Disp: 90 tablet, Rfl: 1    rosuvastatin (CRESTOR) 20 MG tablet, Take 1 tablet (20 mg) by mouth daily, Disp: 90 tablet, Rfl: 1    tiZANidine (ZANAFLEX) 2 MG tablet, TAKE ONE TABLET BY MOUTH EVERY DAY AT BEDTIME AS NEEDED, CAN REPEAT ONCE FOR MUSCLE SPASMS, Disp: 30 tablet, Rfl: 0    VENTOLIN  (90 Base) MCG/ACT inhaler, INHALE 2 PUFFS INTO THE LUNGS EVERY 6 HOURS AS NEEDED FOR SHORTNESS OF BREATH OR DIFFICULT BREATHING OR WHEEZING, Disp: 18 g, Rfl: 3    diazepam (VALIUM) 10 MG tablet, Take 5-10 mg by mouth daily as needed (Patient not taking: Reported on 5/20/2024), Disp: , Rfl:     fluticasone-salmeterol (ADVAIR) 500-50 MCG/ACT inhaler, Inhale 1 puff into the lungs every 12 hours for 90 days, Disp: 3 each, Rfl: 11    guaiFENesin (ROBITUSSIN) 20 mg/mL liquid, Take 200 mg by mouth every 4 hours as needed (Patient not taking: Reported on 5/20/2024), Disp: , Rfl:     rizatriptan (MAXALT-MLT) 10 MG ODT, Take 1 tablet (10 mg) by mouth at onset of headache for migraine May repeat in 2 hours. Max 3 tablets/24 hours. (Patient not taking: Reported on 5/20/2024), Disp: 90 tablet, Rfl: 1    tiotropium (SPIRIVA) 18 MCG inhaled capsule, Inhale 1 capsule (18 mcg) into the lungs daily for 360 days (Patient not taking: Reported on 5/20/2024), Disp: 90 capsule, Rfl: 3    Allergies -   Allergies   Allergen Reactions    Acetaminophen     Hydrocodone     Lisinopril Cough    Vicodin [Hydrocodone-Acetaminophen] Nausea and Vomiting       Social History -   Social History     Socioeconomic History    Marital status:    Tobacco Use    Smoking status: Former     Current packs/day: 0.00     Average packs/day: 0.5 packs/day for 10.0 years (5.0 ttl pk-yrs)     Types: Cigarettes     Start  date: 2002     Quit date: 2012     Years since quittin.3     Passive exposure: Past    Smokeless tobacco: Current    Tobacco comments:     using nicotine replacement: e cigarattes   Vaping Use    Vaping status: Every Day    Substances: Nicotine   Substance and Sexual Activity    Alcohol use: No    Drug use: No    Sexual activity: Not Currently     Partners: Male     Birth control/protection: Abstinence, Female Surgical, None   Other Topics Concern    Parent/sibling w/ CABG, MI or angioplasty before 65F 55M? Yes   Social History Narrative    Lives in apartment, single ().  2 kids.  Drives [employer].          The patient has a 30 pk yr tobacco hx.  She has no active use but using e -cig. Quit actual cig Oct 2012.  Alcohol use is 0 alcoholic drinks per week.  She denies use of recreational drugs.          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          The patient is single.  Has 2 children.        Hot Tube Exposure: NO    Recent Travel: NO     Hx of incarceration:  NO    Bird Exposure:   NO    Animal Exposure:  NO    Inhalation Exposure:  NO             Social Determinants of Health     Financial Resource Strain: Low Risk  (2023)    Financial Resource Strain     Within the past 12 months, have you or your family members you live with been unable to get utilities (heat, electricity) when it was really needed?: No   Food Insecurity: Low Risk  (2023)    Food Insecurity     Within the past 12 months, did you worry that your food would run out before you got money to buy more?: No     Within the past 12 months, did the food you bought just not last and you didn t have money to get more?: No   Transportation Needs: Low Risk  (2023)    Transportation Needs     Within the past 12 months, has lack of transportation kept you from medical appointments, getting your medicines, non-medical meetings or appointments, work, or from getting things that you  need?: No    Received from [facility], [employer] [facility]    Social Connections   Interpersonal Safety: Low Risk  (5/6/2024)    Interpersonal Safety     Do you feel physically and emotionally safe where you currently live?: Yes     Within the past 12 months, have you been hit, slapped, kicked or otherwise physically hurt by someone?: No     Within the past 12 months, have you been humiliated or emotionally abused in other ways by your partner or ex-partner?: No   Housing Stability: Low Risk  (2/25/2024)    Received from [facility], [facility]    Housing Stability     Unable to Pay for Housing in the Last Year: 1   Recent Concern: Housing Stability - High Risk (12/4/2023)    Housing Stability     Do you have housing? : No     Are you worried about losing your housing?: No       Family History -   Family History   Problem Relation Age of Onset    Cerebrovascular Disease Mother     Hypertension Mother     Heart Disease Mother     Cancer Mother         Lung    Substance Abuse Mother     Depression Mother     Cerebrovascular Disease Father     Hypertension Father     Cancer Father         Metastatic, primary lung/Prostate    Substance Abuse Father     Colon Cancer Father     Prostate Cancer Father     Substance Abuse Brother     Hypertension Brother     Depression Brother     Schizophrenia Brother     Bipolar Disorder Brother     Substance Abuse Brother     Depression Brother     Anxiety Disorder Brother     Mental Illness Brother     Breast Cancer Maternal Grandmother     Coronary Artery Disease Maternal Grandmother         MI at 41    Depression Son     Mental Illness Son     Asthma Son     Aneurysm Paternal Uncle         Brain Aneurysms    Cancer Other     Other Cancer No family hx of        Review of Systems - As per HPI and PMHx, otherwise review of system review of the head and neck  "negative. Otherwise 10+ review of system is negative    Physical Exam  /80 (BP Location: Right arm, Patient Position: Sitting, Cuff Size: Adult Regular)   Temp 97.1  F (36.2  C) (Temporal)   Ht 1.676 m (5' 6\")   Wt 55.4 kg (122 lb 3 oz)   BMI 19.72 kg/m    BMI: Body mass index is 19.72 kg/m .    General - The patient is well nourished and well developed, and appears to have good nutritional status.  Alert and oriented to person and place, answers questions and cooperates with examination appropriately.    SKIN - No suspicious lesions or rashes.  Respiration - No respiratory distress.  Head and Face - Normocephalic and atraumatic, with no gross asymmetry noted of the contour of the facial features.  The facial nerve is intact, with strong symmetric movements.    Voice and Breathing - The patient was breathing comfortably without the use of accessory muscles. The patients voice was clear and strong, and had appropriate pitch and quality.    Ears - Bilateral pinna and EACs with normal appearing overlying skin. Tympanic membrane intact with good mobility on pneumatic otoscopy bilaterally. Bony landmarks of the ossicular chain are normal. The tympanic membranes are normal in appearance. No retraction, perforation, or masses.  No fluid or purulence was seen in the external canal or the middle ear.     Eyes - Extraocular movements intact.  Sclera were not icteric or injected, conjunctiva were pink and moist.    Mouth - Examination of the oral cavity showed pink, healthy oral mucosa.  I see linear raised lesion in the gingival labial sulcus left upper area.  Appears to be central ulceration indentation..  The tongue was mobile and midline, and the dentition were in good condition.      Throat - The walls of the oropharynx were smooth, pink, moist, symmetric, and had no lesions or ulcerations.  The tonsillar pillars and soft palate were symmetric.  The uvula was midline on elevation.    Neck - Normal midline " excursion of the laryngotracheal complex during swallowing.  Full range of motion on passive movement.  Palpation of the occipital, submental, submandibular, internal jugular chain, and supraclavicular nodes did not demonstrate any abnormal lymph nodes or masses.  The carotid pulse was palpable bilaterally.  Palpation of the thyroid was soft and smooth, with no nodules or goiter appreciated.  The trachea was mobile and midline.    Nose - External contour is symmetric, no gross deflection or scars.  Nasal mucosa is pink and moist with no abnormal mucus.  The septum was midline and non-obstructive, turbinates of normal size and position.  No polyps, masses, or purulence noted on examination.    Neuro - Nonfocal neuro exam is normal, CN 2 through 12 intact, normal gait and muscle tone.      Performed in clinic today:  Biopsy of mouth lesion.  Patient stands risks and benefits of the biopsy of the gingivolabial sulcus lesion wishes to go ahead with it.  Even though this could be result of trauma as she wears her upper dentures considering her history any neoplastic process cannot be excluded.  Therefore she understands the risks of bleeding and infection and wishes to go ahead with the biopsy.  First use topical Cetacaine to anesthetize the area followed by injection using 1% lidocaine 1-100,000 epinephrine.  Using 4 mm punch biopsy I obtained a full specimen with the core of the lesion included.  Hemostasis well-controlled with silver nitrate.      A/P - Georgette Pereira is a 63 year old female had a biopsy today for lesion.  Will await the results.  In the meantime ibuprofen alternating with Tylenol for any discomfort gargling with saline avoiding wearing her dentures avoiding spicy citrusy foods.  Patient will follow-up in a couple weeks.      Raoul Eller MD

## 2024-05-20 ENCOUNTER — TELEPHONE (OUTPATIENT)
Dept: OTOLARYNGOLOGY | Facility: CLINIC | Age: 64
End: 2024-05-20

## 2024-05-20 ENCOUNTER — TELEPHONE (OUTPATIENT)
Dept: NURSING | Facility: CLINIC | Age: 64
End: 2024-05-20

## 2024-05-20 ENCOUNTER — MYC MEDICAL ADVICE (OUTPATIENT)
Dept: FAMILY MEDICINE | Facility: CLINIC | Age: 64
End: 2024-05-20

## 2024-05-20 ENCOUNTER — OFFICE VISIT (OUTPATIENT)
Dept: OTOLARYNGOLOGY | Facility: CLINIC | Age: 64
End: 2024-05-20
Attending: PHYSICIAN ASSISTANT
Payer: COMMERCIAL

## 2024-05-20 VITALS
SYSTOLIC BLOOD PRESSURE: 112 MMHG | TEMPERATURE: 97.1 F | HEIGHT: 66 IN | WEIGHT: 122.19 LBS | DIASTOLIC BLOOD PRESSURE: 80 MMHG | BODY MASS INDEX: 19.64 KG/M2

## 2024-05-20 DIAGNOSIS — K13.70 ORAL LESION: Primary | ICD-10-CM

## 2024-05-20 PROCEDURE — 40808 BIOPSY OF MOUTH LESION: CPT | Performed by: OTOLARYNGOLOGY

## 2024-05-20 PROCEDURE — 99203 OFFICE O/P NEW LOW 30 MIN: CPT | Mod: 25 | Performed by: OTOLARYNGOLOGY

## 2024-05-20 PROCEDURE — 88305 TISSUE EXAM BY PATHOLOGIST: CPT | Performed by: PATHOLOGY

## 2024-05-20 ASSESSMENT — PAIN SCALES - GENERAL: PAINLEVEL: NO PAIN (1)

## 2024-05-20 NOTE — TELEPHONE ENCOUNTER
Julian for pt to call clinic back. Per Dr. Eller pt should use tylenol every 4hrs or ibuprofen every 6hrs for the pain alternating between the two. Andree Ambrocio CMA

## 2024-05-20 NOTE — TELEPHONE ENCOUNTER
[facility] Call Center    Phone Message    May a detailed message be left on voicemail: yes     Reason for Call: Other: Pt called in regards to her mouth biopsy she had today. Pt is in a lot of pain. Please call pt back to discuss     Action Taken: Other: PH ENT    Travel Screening: Not Applicable

## 2024-05-20 NOTE — TELEPHONE ENCOUNTER
Informed pt of this - patient then stated that the tylenol was not working- I asked patient if she had taken any ibuprofen- pt then started mumbling and hung up.

## 2024-05-20 NOTE — LETTER
5/20/2024         RE: Georgette Pereira  [address]  [place] [address]        Dear Colleague,    Thank you for referring your patient, Georgette Pereira, to the [facility]. Please see a copy of my visit note below.    ENT Consultation    Georgette Pereira who is a 63 year old female seen in consultation at the request of Shelly Finley PA-C.      History of Present Illness - Georgette Pereira is a 63 year old female presents with a chief complaint of sore in her mouth for the last 3 to 4 months.  It hurts and is getting larger.  It was initially diagnosed a canker sore.  The patient was a smoker quit 14 years ago but until recently was vaping.  She apparently had some teeth extraction on the left side and since then has had some numbness of the gum itself in the lower portion of the nose.  Patient is edentulous with upper gums and wears full dentures.          BP Readings from Last 1 Encounters:   05/20/24 112/80       BP noted to be well controlled today in office.     Georgette IS NOT a smoker/uses chewing tobacco.        Past Medical History -   Past Medical History:   Diagnosis Date     ACL (anterior cruciate ligament) tear 02/27/2012    Left      Cerebral infarction (H) 7/2022     Chronic obstructive pulmonary disease with acute exacerbation (H) 11/23/2014    First hospitalization 11/23/14      CKD (chronic kidney disease) stage 3, GFR 30-59 ml/min (H) 06/30/2020     CTS (carpal tunnel syndrome) 05/01/2010     Degenerative joint disease      Depressive disorder      Hypertension      Temporomandibular joint disorders, unspecified      Uncomplicated asthma        Current Medications -   Current Outpatient Medications:      albuterol (PROVENTIL) (2.5 MG/3ML) 0.083% neb solution, Take 1 vial (2.5 mg) by nebulization 4 times daily, Disp: 100 mL, Rfl: 4     aspirin (ASA) 325 MG EC tablet, Take 325 mg by mouth every 6 hours as needed, Disp: , Rfl:      buPROPion  (WELLBUTRIN XL) 300 MG 24 hr tablet, Take 1 tablet (300 mg) by mouth every morning, Disp: 90 tablet, Rfl: 0     busPIRone (BUSPAR) 10 MG tablet, Take 1 tablet (10 mg) by mouth 2 times daily, Disp: 180 tablet, Rfl: 1     DULoxetine (CYMBALTA) 60 MG capsule, Take 1 capsule (60 mg) by mouth daily, Disp: 90 capsule, Rfl: 1     losartan-hydrochlorothiazide (HYZAAR) 100-25 MG tablet, Take 1 tablet by mouth daily, Disp: 90 tablet, Rfl: 1     metoprolol succinate ER (TOPROL XL) 50 MG 24 hr tablet, Take 1 tablet (50 mg) by mouth daily, Disp: 90 tablet, Rfl: 1     rosuvastatin (CRESTOR) 20 MG tablet, Take 1 tablet (20 mg) by mouth daily, Disp: 90 tablet, Rfl: 1     tiZANidine (ZANAFLEX) 2 MG tablet, TAKE ONE TABLET BY MOUTH EVERY DAY AT BEDTIME AS NEEDED, CAN REPEAT ONCE FOR MUSCLE SPASMS, Disp: 30 tablet, Rfl: 0     VENTOLIN  (90 Base) MCG/ACT inhaler, INHALE 2 PUFFS INTO THE LUNGS EVERY 6 HOURS AS NEEDED FOR SHORTNESS OF BREATH OR DIFFICULT BREATHING OR WHEEZING, Disp: 18 g, Rfl: 3     diazepam (VALIUM) 10 MG tablet, Take 5-10 mg by mouth daily as needed (Patient not taking: Reported on 5/20/2024), Disp: , Rfl:      fluticasone-salmeterol (ADVAIR) 500-50 MCG/ACT inhaler, Inhale 1 puff into the lungs every 12 hours for 90 days, Disp: 3 each, Rfl: 11     guaiFENesin (ROBITUSSIN) 20 mg/mL liquid, Take 200 mg by mouth every 4 hours as needed (Patient not taking: Reported on 5/20/2024), Disp: , Rfl:      rizatriptan (MAXALT-MLT) 10 MG ODT, Take 1 tablet (10 mg) by mouth at onset of headache for migraine May repeat in 2 hours. Max 3 tablets/24 hours. (Patient not taking: Reported on 5/20/2024), Disp: 90 tablet, Rfl: 1     tiotropium (SPIRIVA) 18 MCG inhaled capsule, Inhale 1 capsule (18 mcg) into the lungs daily for 360 days (Patient not taking: Reported on 5/20/2024), Disp: 90 capsule, Rfl: 3    Allergies -   Allergies   Allergen Reactions     Acetaminophen      Hydrocodone      Lisinopril Cough     Vicodin  [Hydrocodone-Acetaminophen] Nausea and Vomiting       Social History -   Social History     Socioeconomic History     Marital status:    Tobacco Use     Smoking status: Former     Current packs/day: 0.00     Average packs/day: 0.5 packs/day for 10.0 years (5.0 ttl pk-yrs)     Types: Cigarettes     Start date: 2002     Quit date: 2012     Years since quittin.3     Passive exposure: Past     Smokeless tobacco: Current     Tobacco comments:     using nicotine replacement: e cigarattes   Vaping Use     Vaping status: Every Day     Substances: Nicotine   Substance and Sexual Activity     Alcohol use: No     Drug use: No     Sexual activity: Not Currently     Partners: Male     Birth control/protection: Abstinence, Female Surgical, None   Other Topics Concern     Parent/sibling w/ CABG, MI or angioplasty before 65F 55M? Yes   Social History Narrative    Lives in apartment, single ().  2 kids.  Drives [employer].          The patient has a 30 pk yr tobacco hx.  She has no active use but using e -cig. Quit actual cig Oct 2012.  Alcohol use is 0 alcoholic drinks per week.  She denies use of recreational drugs.          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          The patient is single.  Has 2 children.        Hot Tube Exposure: NO    Recent Travel: NO     Hx of incarceration:  NO    Bird Exposure:   NO    Animal Exposure:  NO    Inhalation Exposure:  NO             Social Determinants of Health     Financial Resource Strain: Low Risk  (2023)    Financial Resource Strain      Within the past 12 months, have you or your family members you live with been unable to get utilities (heat, electricity) when it was really needed?: No   Food Insecurity: Low Risk  (2023)    Food Insecurity      Within the past 12 months, did you worry that your food would run out before you got money to buy more?: No      Within the past 12 months, did the food you bought  just not last and you didn t have money to get more?: No   Transportation Needs: Low Risk  (12/4/2023)    Transportation Needs      Within the past 12 months, has lack of transportation kept you from medical appointments, getting your medicines, non-medical meetings or appointments, work, or from getting things that you need?: No    Received from [facility], [facility] [employer] [facility]    Social Connections   Interpersonal Safety: Low Risk  (5/6/2024)    Interpersonal Safety      Do you feel physically and emotionally safe where you currently live?: Yes      Within the past 12 months, have you been hit, slapped, kicked or otherwise physically hurt by someone?: No      Within the past 12 months, have you been humiliated or emotionally abused in other ways by your partner or ex-partner?: No   Housing Stability: Low Risk  (2/25/2024)    [facility] [employer] [facility] [employer]  [facility]    Housing Stability      Unable to Pay for Housing in the Last Year: 1   Recent Concern: Housing Stability - High Risk (12/4/2023)    Housing Stability      Do you have housing? : No      Are you worried about losing your housing?: No       Family History -   Family History   Problem Relation Age of Onset     Cerebrovascular Disease Mother      Hypertension Mother      Heart Disease Mother      Cancer Mother         Lung     Substance Abuse Mother      Depression Mother      Cerebrovascular Disease Father      Hypertension Father      Cancer Father         Metastatic, primary lung/Prostate     Substance Abuse Father      Colon Cancer Father      Prostate Cancer Father      Substance Abuse Brother      Hypertension Brother      Depression Brother      Schizophrenia Brother      Bipolar Disorder Brother      Substance Abuse Brother      Depression Brother      Anxiety Disorder Brother      Mental Illness Brother      Breast Cancer  "Maternal Grandmother      Coronary Artery Disease Maternal Grandmother         MI at 41     Depression Son      Mental Illness Son      Asthma Son      Aneurysm Paternal Uncle         Brain Aneurysms     Cancer Other      Other Cancer No family hx of        Review of Systems - As per HPI and PMHx, otherwise review of system review of the head and neck negative. Otherwise 10+ review of system is negative    Physical Exam  /80 (BP Location: Right arm, Patient Position: Sitting, Cuff Size: Adult Regular)   Temp 97.1  F (36.2  C) (Temporal)   Ht 1.676 m (5' 6\")   Wt 55.4 kg (122 lb 3 oz)   BMI 19.72 kg/m    BMI: Body mass index is 19.72 kg/m .    General - The patient is well nourished and well developed, and appears to have good nutritional status.  Alert and oriented to person and place, answers questions and cooperates with examination appropriately.    SKIN - No suspicious lesions or rashes.  Respiration - No respiratory distress.  Head and Face - Normocephalic and atraumatic, with no gross asymmetry noted of the contour of the facial features.  The facial nerve is intact, with strong symmetric movements.    Voice and Breathing - The patient was breathing comfortably without the use of accessory muscles. The patients voice was clear and strong, and had appropriate pitch and quality.    Ears - Bilateral pinna and EACs with normal appearing overlying skin. Tympanic membrane intact with good mobility on pneumatic otoscopy bilaterally. Bony landmarks of the ossicular chain are normal. The tympanic membranes are normal in appearance. No retraction, perforation, or masses.  No fluid or purulence was seen in the external canal or the middle ear.     Eyes - Extraocular movements intact.  Sclera were not icteric or injected, conjunctiva were pink and moist.    Mouth - Examination of the oral cavity showed pink, healthy oral mucosa.  I see linear raised lesion in the gingival labial sulcus left upper area.  Appears " to be central ulceration indentation..  The tongue was mobile and midline, and the dentition were in good condition.      Throat - The walls of the oropharynx were smooth, pink, moist, symmetric, and had no lesions or ulcerations.  The tonsillar pillars and soft palate were symmetric.  The uvula was midline on elevation.    Neck - Normal midline excursion of the laryngotracheal complex during swallowing.  Full range of motion on passive movement.  Palpation of the occipital, submental, submandibular, internal jugular chain, and supraclavicular nodes did not demonstrate any abnormal lymph nodes or masses.  The carotid pulse was palpable bilaterally.  Palpation of the thyroid was soft and smooth, with no nodules or goiter appreciated.  The trachea was mobile and midline.    Nose - External contour is symmetric, no gross deflection or scars.  Nasal mucosa is pink and moist with no abnormal mucus.  The septum was midline and non-obstructive, turbinates of normal size and position.  No polyps, masses, or purulence noted on examination.    Neuro - Nonfocal neuro exam is normal, CN 2 through 12 intact, normal gait and muscle tone.      Performed in clinic today:  Biopsy of mouth lesion.  Patient stands risks and benefits of the biopsy of the gingivolabial sulcus lesion wishes to go ahead with it.  Even though this could be result of trauma as she wears her upper dentures considering her history any neoplastic process cannot be excluded.  Therefore she understands the risks of bleeding and infection and wishes to go ahead with the biopsy.  First use topical Cetacaine to anesthetize the area followed by injection using 1% lidocaine 1-100,000 epinephrine.  Using 4 mm punch biopsy I obtained a full specimen with the core of the lesion included.  Hemostasis well-controlled with silver nitrate.      A/P - Georgette Pereira is a 63 year old female had a biopsy today for lesion.  Will await the results.  In the meantime  ibuprofen alternating with Tylenol for any discomfort gargling with saline avoiding wearing her dentures avoiding spicy citrusy foods.  Patient will follow-up in a couple weeks.      Raoul Eller MD       Again, thank you for allowing me to participate in the care of your patient.        Sincerely,        Raoul Eller MD, MD

## 2024-05-21 ENCOUNTER — MYC MEDICAL ADVICE (OUTPATIENT)
Dept: OTOLARYNGOLOGY | Facility: CLINIC | Age: 64
End: 2024-05-21
Payer: COMMERCIAL

## 2024-05-21 DIAGNOSIS — K13.70 ORAL LESION: Primary | ICD-10-CM

## 2024-05-21 NOTE — TELEPHONE ENCOUNTER
Followed-up with patient. Pain has improved. No further concerns with pain at this time.     Jazmín Mcgraw RN on 5/21/2024 at 9:22 AM

## 2024-05-22 ENCOUNTER — OFFICE VISIT (OUTPATIENT)
Dept: DERMATOLOGY | Facility: CLINIC | Age: 64
End: 2024-05-22
Payer: COMMERCIAL

## 2024-05-22 VITALS — SYSTOLIC BLOOD PRESSURE: 127 MMHG | HEART RATE: 85 BPM | DIASTOLIC BLOOD PRESSURE: 85 MMHG | OXYGEN SATURATION: 100 %

## 2024-05-22 DIAGNOSIS — S90.222D: Primary | ICD-10-CM

## 2024-05-22 DIAGNOSIS — L60.8 BLACK NAILS: ICD-10-CM

## 2024-05-22 LAB
PATH REPORT.COMMENTS IMP SPEC: NORMAL
PATH REPORT.COMMENTS IMP SPEC: NORMAL
PATH REPORT.FINAL DX SPEC: NORMAL
PATH REPORT.GROSS SPEC: NORMAL
PATH REPORT.MICROSCOPIC SPEC OTHER STN: NORMAL
PATH REPORT.RELEVANT HX SPEC: NORMAL
PHOTO IMAGE: NORMAL

## 2024-05-22 PROCEDURE — 99202 OFFICE O/P NEW SF 15 MIN: CPT | Performed by: DERMATOLOGY

## 2024-05-22 RX ORDER — METHYLPREDNISOLONE 4 MG
TABLET, DOSE PACK ORAL
Qty: 21 TABLET | Refills: 0 | Status: SHIPPED | OUTPATIENT
Start: 2024-05-22 | End: 2024-07-24

## 2024-05-22 ASSESSMENT — PAIN SCALES - GENERAL: PAINLEVEL: MILD PAIN (3)

## 2024-05-22 NOTE — LETTER
5/22/2024      Georgette Pereira  [address]  [place] [address]      Dear Colleague,    Thank you for referring your patient, Georgette Pereira, to the [facility]. Please see a copy of my visit note below.    [facility] Dermatology Note  Encounter Date: May 22, 2024  Office Visit     Dermatology Problem List:  1. Subungual hemorrhage, left great toe    ____________________________________________    Assessment & Plan:    # Nail dystrophy consistent with trauma, left great toe    - Subungual and nail plate hemorrhage visible on dermoscopy. Without her recollection of an injury the possibility of an undiagnosed lesion exists. However, the resolution of the discoloration in the proximal nail argues for an incident that has now resolved. The patient is in favor of observation instead of a nail biopsy. Photographs were taking today in the clinic.   - Plan to see her back in 3 months to compare the appearance of the nail. If additional hemorrhage or repeat hemorrhage occurs this argues more strongly for a nail biopsy.      Procedures Performed:   none    Follow-up: 3 month(s) in-person, or earlier for new or changing lesions    Staff and Scribe:     Scribe Disclosure:   I, Monika Reese, am serving as a scribe; to document services personally performed by Dr. Luis Carlos Chatman - -based on data collection and the provider's statements to me.     Provider Disclosure:   The documentation recorded by the scribe accurately reflects the services I personally performed and the decisions made by me.    Luis Carlos Chatman DO    Department of Dermatology  [facility]: Phone: [phone], Fax:[phone]  [facility]: Phone: [phone], Fax: [phone]    ____________________________________________    CC: Biopsy (Biopsy of left great toenail - black  horizontal stripe across nail has been present for 3 months, becoming painful )    HPI:  Ms. Georgette Pereira is a(n) 63 year old female who presents today as a return patient for a nail biopsy.   - She reports painting her nails every week. One week her left great toenail looked normal and one week there was a dark purple line across the entire thing. She does not recall an injury around the time this occurred or in the preceding months. She notes that now her nail does feel more painful than when she first noticed this line. The discoloration does seem to be migrating outward as the nail grows. There is normal appearing nail plate visible approximal to the pigment band.     Patient is otherwise feeling well, without additional skin concerns.    Labs Reviewed:  N/A    Physical Exam:  Vitals: /85   Pulse 85   SpO2 100%   SKIN: Focused examination of left great toe was performed.  - There is a horizontal uniform dark purple to red patch within and below the nail plate of the left great toe. There is clear nail plate without pigmentation proximally.   - No other lesions of concern on areas examined.     Medications:  Current Outpatient Medications   Medication Sig Dispense Refill     albuterol (PROVENTIL) (2.5 MG/3ML) 0.083% neb solution Take 1 vial (2.5 mg) by nebulization 4 times daily 100 mL 4     aspirin (ASA) 325 MG EC tablet Take 325 mg by mouth every 6 hours as needed       buPROPion (WELLBUTRIN XL) 300 MG 24 hr tablet Take 1 tablet (300 mg) by mouth every morning 90 tablet 0     busPIRone (BUSPAR) 10 MG tablet Take 1 tablet (10 mg) by mouth 2 times daily 180 tablet 1     diazepam (VALIUM) 10 MG tablet Take 5-10 mg by mouth daily as needed       DULoxetine (CYMBALTA) 60 MG capsule Take 1 capsule (60 mg) by mouth daily 90 capsule 1     losartan-hydrochlorothiazide (HYZAAR) 100-25 MG tablet Take 1 tablet by mouth daily 90 tablet 1     methylPREDNISolone (MEDROL DOSEPAK) 4 MG tablet therapy pack Follow  Package Directions 21 tablet 0     metoprolol succinate ER (TOPROL XL) 50 MG 24 hr tablet Take 1 tablet (50 mg) by mouth daily 90 tablet 1     rizatriptan (MAXALT-MLT) 10 MG ODT Take 1 tablet (10 mg) by mouth at onset of headache for migraine May repeat in 2 hours. Max 3 tablets/24 hours. 90 tablet 1     rosuvastatin (CRESTOR) 20 MG tablet Take 1 tablet (20 mg) by mouth daily 90 tablet 1     tiotropium (SPIRIVA) 18 MCG inhaled capsule Inhale 1 capsule (18 mcg) into the lungs daily for 360 days 90 capsule 3     tiZANidine (ZANAFLEX) 2 MG tablet TAKE ONE TABLET BY MOUTH EVERY DAY AT BEDTIME AS NEEDED, CAN REPEAT ONCE FOR MUSCLE SPASMS 30 tablet 0     VENTOLIN  (90 Base) MCG/ACT inhaler INHALE 2 PUFFS INTO THE LUNGS EVERY 6 HOURS AS NEEDED FOR SHORTNESS OF BREATH OR DIFFICULT BREATHING OR WHEEZING 18 g 3     fluticasone-salmeterol (ADVAIR) 500-50 MCG/ACT inhaler Inhale 1 puff into the lungs every 12 hours for 90 days 3 each 11     guaiFENesin (ROBITUSSIN) 20 mg/mL liquid Take 200 mg by mouth every 4 hours as needed (Patient not taking: Reported on 5/20/2024)       No current facility-administered medications for this visit.      Past Medical History:   Patient Active Problem List   Diagnosis     Pulmonary nodules     Anxiety     Fibromyalgia     Knee pain     History of pneumonia, recurrent     Internal hemorrhoids with other complication     COPD, moderate (H)     Adult ADHD     Family history of early CAD     Hyperlipidemia LDL goal <130     Essential hypertension with goal blood pressure less than 140/90     Primary osteoarthritis involving multiple joints     Advanced directives, counseling/discussion     CKD (chronic kidney disease) stage 3, GFR 30-59 ml/min (H)     Chronic kidney disease, stage 3a (H)     Moderate recurrent major depression (H)     History of CVA (cerebrovascular accident) without residual deficits     PAD (peripheral artery disease) (H24)     Past Medical History:   Diagnosis Date     ACL  (anterior cruciate ligament) tear 02/27/2012    Left      Cerebral infarction (H) 7/2022     Chronic obstructive pulmonary disease with acute exacerbation (H) 11/23/2014    First hospitalization 11/23/14      CKD (chronic kidney disease) stage 3, GFR 30-59 ml/min (H) 06/30/2020     CTS (carpal tunnel syndrome) 05/01/2010     Degenerative joint disease      Depressive disorder      Hypertension      Temporomandibular joint disorders, unspecified      Uncomplicated asthma         CC Shelly Finley, PA-C  [address] [place] [address] on close of this encounter.      Again, thank you for allowing me to participate in the care of your patient.        Sincerely,        Luis Carlos Chatman MD

## 2024-05-22 NOTE — TELEPHONE ENCOUNTER
Dr. Eller reviewed the pathology from Monday. Reports a normal biopsy of inflammation, no cancer. Patient informed.    Dr. Godoy reviewed triage note. Also participated in the phone call back to patient. He wanted to ensure patient able to close both eyes, crinkle nose, smile, and purse lips. She was able to do those things but her smile and pursing lips were compromised secondary to swelling. Dr. Eller reviewed photos and video of patient. This started 2 days ago and gradually gotten worse since. Declines stroke symptoms. Has history of CVA. Advised to start steroid dosepak today for the swelling. Advised to go to the ED if stroke-like symptoms evolve. Does not need to go to ED with current set of symptoms.    Medrol dose ordered.    Adri Garibay RN on 5/22/2024 at 11:33 AM

## 2024-05-22 NOTE — TELEPHONE ENCOUNTER
Triaged patient over the phone.     Patient had a biopsy of mouth lesion of the gingivolabial sulcus lesion on 5/20/24 with Dr. Eller.     Patient is stating the swelling is worsening. Patient states swelling travel from the left side of her mouth to the left side of her nose and below her left eye. She states there is numbness from her chin and travels to her left mouth, nose, cheek and eye.     There is left sided facial drooping as pictured in patient's MyChart images. Patient denies warmth to touch, rash, redness to the swelling/numb areas, fever or pain. There is no drainage from biopsy site; however, the biopsy site is red. Patient can swallow without issues. Patient can move left side arm and hands.     Advised patient since swelling and numbness is spreading 2 days after the procedure she should be evaluated at the UC or ED. Patient would like to consult with the physician first.     Routing to Dr. Eller to advise.     Jazmín Mcgraw RN on 5/22/2024 at 9:28 AM

## 2024-05-22 NOTE — PROGRESS NOTES
[facility] Dermatology Note  Encounter Date: May 22, 2024  Office Visit     Dermatology Problem List:  1. Subungual hemorrhage, left great toe    ____________________________________________    Assessment & Plan:    # Nail dystrophy consistent with trauma, left great toe    - Subungual and nail plate hemorrhage visible on dermoscopy. Without her recollection of an injury the possibility of an undiagnosed lesion exists. However, the resolution of the discoloration in the proximal nail argues for an incident that has now resolved. The patient is in favor of observation instead of a nail biopsy. Photographs were taking today in the clinic.   - Plan to see her back in 3 months to compare the appearance of the nail. If additional hemorrhage or repeat hemorrhage occurs this argues more strongly for a nail biopsy.      Procedures Performed:   none    Follow-up: 3 month(s) in-person, or earlier for new or changing lesions    Staff and Scribe:     Scribe Disclosure:   I, Monika Reese, am serving as a scribe; to document services personally performed by Dr. Luis Carlos Chatman - -based on data collection and the provider's statements to me.     Provider Disclosure:   The documentation recorded by the scribe accurately reflects the services I personally performed and the decisions made by me.    Luis Carlos Chatman DO    Department of Dermatology  [facility]: Phone: [phone], Fax:[phone]  [facility]: Phone: [phone], Fax: [phone]    ____________________________________________    CC: Biopsy (Biopsy of left great toenail - black horizontal stripe across nail has been present for 3 months, becoming painful )    HPI:  Ms. Georgette Pereira is a(n) 63 year old female who presents today as a return patient for a nail biopsy.   - She reports painting her nails every week. One week  her left great toenail looked normal and one week there was a dark purple line across the entire thing. She does not recall an injury around the time this occurred or in the preceding months. She notes that now her nail does feel more painful than when she first noticed this line. The discoloration does seem to be migrating outward as the nail grows. There is normal appearing nail plate visible approximal to the pigment band.     Patient is otherwise feeling well, without additional skin concerns.    Labs Reviewed:  N/A    Physical Exam:  Vitals: /85   Pulse 85   SpO2 100%   SKIN: Focused examination of left great toe was performed.  - There is a horizontal uniform dark purple to red patch within and below the nail plate of the left great toe. There is clear nail plate without pigmentation proximally.   - No other lesions of concern on areas examined.     Medications:  Current Outpatient Medications   Medication Sig Dispense Refill    albuterol (PROVENTIL) (2.5 MG/3ML) 0.083% neb solution Take 1 vial (2.5 mg) by nebulization 4 times daily 100 mL 4    aspirin (ASA) 325 MG EC tablet Take 325 mg by mouth every 6 hours as needed      buPROPion (WELLBUTRIN XL) 300 MG 24 hr tablet Take 1 tablet (300 mg) by mouth every morning 90 tablet 0    busPIRone (BUSPAR) 10 MG tablet Take 1 tablet (10 mg) by mouth 2 times daily 180 tablet 1    diazepam (VALIUM) 10 MG tablet Take 5-10 mg by mouth daily as needed      DULoxetine (CYMBALTA) 60 MG capsule Take 1 capsule (60 mg) by mouth daily 90 capsule 1    losartan-hydrochlorothiazide (HYZAAR) 100-25 MG tablet Take 1 tablet by mouth daily 90 tablet 1    methylPREDNISolone (MEDROL DOSEPAK) 4 MG tablet therapy pack Follow Package Directions 21 tablet 0    metoprolol succinate ER (TOPROL XL) 50 MG 24 hr tablet Take 1 tablet (50 mg) by mouth daily 90 tablet 1    rizatriptan (MAXALT-MLT) 10 MG ODT Take 1 tablet (10 mg) by mouth at onset of headache for migraine May repeat in 2  hours. Max 3 tablets/24 hours. 90 tablet 1    rosuvastatin (CRESTOR) 20 MG tablet Take 1 tablet (20 mg) by mouth daily 90 tablet 1    tiotropium (SPIRIVA) 18 MCG inhaled capsule Inhale 1 capsule (18 mcg) into the lungs daily for 360 days 90 capsule 3    tiZANidine (ZANAFLEX) 2 MG tablet TAKE ONE TABLET BY MOUTH EVERY DAY AT BEDTIME AS NEEDED, CAN REPEAT ONCE FOR MUSCLE SPASMS 30 tablet 0    VENTOLIN  (90 Base) MCG/ACT inhaler INHALE 2 PUFFS INTO THE LUNGS EVERY 6 HOURS AS NEEDED FOR SHORTNESS OF BREATH OR DIFFICULT BREATHING OR WHEEZING 18 g 3    fluticasone-salmeterol (ADVAIR) 500-50 MCG/ACT inhaler Inhale 1 puff into the lungs every 12 hours for 90 days 3 each 11    guaiFENesin (ROBITUSSIN) 20 mg/mL liquid Take 200 mg by mouth every 4 hours as needed (Patient not taking: Reported on 5/20/2024)       No current facility-administered medications for this visit.      Past Medical History:   Patient Active Problem List   Diagnosis    Pulmonary nodules    Anxiety    Fibromyalgia    Knee pain    History of pneumonia, recurrent    Internal hemorrhoids with other complication    COPD, moderate (H)    Adult ADHD    Family history of early CAD    Hyperlipidemia LDL goal <130    Essential hypertension with goal blood pressure less than 140/90    Primary osteoarthritis involving multiple joints    Advanced directives, counseling/discussion    CKD (chronic kidney disease) stage 3, GFR 30-59 ml/min (H)    Chronic kidney disease, stage 3a (H)    Moderate recurrent major depression (H)    History of CVA (cerebrovascular accident) without residual deficits    PAD (peripheral artery disease) (H24)     Past Medical History:   Diagnosis Date    ACL (anterior cruciate ligament) tear 02/27/2012    Left     Cerebral infarction (H) 7/2022    Chronic obstructive pulmonary disease with acute exacerbation (H) 11/23/2014    First hospitalization 11/23/14     CKD (chronic kidney disease) stage 3, GFR 30-59 ml/min (H) 06/30/2020    CTS  (carpal tunnel syndrome) 05/01/2010    Degenerative joint disease     Depressive disorder     Hypertension     Temporomandibular joint disorders, unspecified     Uncomplicated asthma         CC Shelly Finley PA-C  [id number] [place] [address] on close of this encounter.

## 2024-05-22 NOTE — NURSING NOTE
Georgette Pereira's chief complaint for this visit includes:  Chief Complaint   Patient presents with    Biopsy     Biopsy of left great toenail - black horizontal stripe across nail has been present for 3 months, becoming painful      PCP: Marguerite Garcia    Referring Provider:  ANGIE CaceresC  [address] [place] [address]    /85   Pulse 85   SpO2 100%   Mild Pain (3)        Allergies   Allergen Reactions    Acetaminophen     Hydrocodone     Lisinopril Cough    Vicodin [Hydrocodone-Acetaminophen] Nausea and Vomiting         Do you need any medication refills at today's visit? Rosario Mane, CMA

## 2024-05-31 DIAGNOSIS — M79.7 FIBROMYALGIA: ICD-10-CM

## 2024-05-31 RX ORDER — TIZANIDINE 2 MG/1
TABLET ORAL
Qty: 30 TABLET | Refills: 0 | Status: SHIPPED | OUTPATIENT
Start: 2024-05-31 | End: 2024-07-24

## 2024-07-12 ENCOUNTER — ANCILLARY PROCEDURE (OUTPATIENT)
Dept: CT IMAGING | Facility: CLINIC | Age: 64
End: 2024-07-12
Attending: INTERNAL MEDICINE
Payer: COMMERCIAL

## 2024-07-12 DIAGNOSIS — Z87.891 PERSONAL HISTORY OF TOBACCO USE: ICD-10-CM

## 2024-07-12 PROCEDURE — 71271 CT THORAX LUNG CANCER SCR C-: CPT | Mod: TC | Performed by: RADIOLOGY

## 2024-07-24 ENCOUNTER — OFFICE VISIT (OUTPATIENT)
Dept: FAMILY MEDICINE | Facility: CLINIC | Age: 64
End: 2024-07-24
Payer: COMMERCIAL

## 2024-07-24 VITALS
HEART RATE: 61 BPM | DIASTOLIC BLOOD PRESSURE: 83 MMHG | OXYGEN SATURATION: 100 % | SYSTOLIC BLOOD PRESSURE: 125 MMHG | BODY MASS INDEX: 19.73 KG/M2 | HEIGHT: 66 IN | WEIGHT: 122.8 LBS | TEMPERATURE: 97.5 F

## 2024-07-24 DIAGNOSIS — R73.03 PREDIABETES: ICD-10-CM

## 2024-07-24 DIAGNOSIS — F41.0 PANIC ATTACK: ICD-10-CM

## 2024-07-24 DIAGNOSIS — J44.9 CHRONIC OBSTRUCTIVE PULMONARY DISEASE, UNSPECIFIED COPD TYPE (H): ICD-10-CM

## 2024-07-24 DIAGNOSIS — Z12.31 ENCOUNTER FOR SCREENING MAMMOGRAM FOR BREAST CANCER: ICD-10-CM

## 2024-07-24 DIAGNOSIS — F33.1 MODERATE EPISODE OF RECURRENT MAJOR DEPRESSIVE DISORDER (H): Primary | ICD-10-CM

## 2024-07-24 DIAGNOSIS — I10 ESSENTIAL HYPERTENSION WITH GOAL BLOOD PRESSURE LESS THAN 140/90: ICD-10-CM

## 2024-07-24 DIAGNOSIS — F41.9 ANXIETY: ICD-10-CM

## 2024-07-24 DIAGNOSIS — M79.7 FIBROMYALGIA: ICD-10-CM

## 2024-07-24 DIAGNOSIS — E78.5 HYPERLIPIDEMIA LDL GOAL <100: ICD-10-CM

## 2024-07-24 LAB
ANION GAP SERPL CALCULATED.3IONS-SCNC: 10 MMOL/L (ref 7–15)
BUN SERPL-MCNC: 20.1 MG/DL (ref 8–23)
CALCIUM SERPL-MCNC: 10.9 MG/DL (ref 8.8–10.4)
CHLORIDE SERPL-SCNC: 103 MMOL/L (ref 98–107)
CHOLEST SERPL-MCNC: 181 MG/DL
CREAT SERPL-MCNC: 1.31 MG/DL (ref 0.51–0.95)
CREAT UR-MCNC: 134 MG/DL
EGFRCR SERPLBLD CKD-EPI 2021: 46 ML/MIN/1.73M2
FASTING STATUS PATIENT QL REPORTED: YES
FASTING STATUS PATIENT QL REPORTED: YES
GLUCOSE SERPL-MCNC: 100 MG/DL (ref 70–99)
HBA1C MFR BLD: 5.7 % (ref 0–5.6)
HCO3 SERPL-SCNC: 27 MMOL/L (ref 22–29)
HDLC SERPL-MCNC: 66 MG/DL
LDLC SERPL CALC-MCNC: 93 MG/DL
MICROALBUMIN UR-MCNC: 134 MG/L
MICROALBUMIN/CREAT UR: 100 MG/G CR (ref 0–25)
NONHDLC SERPL-MCNC: 115 MG/DL
POTASSIUM SERPL-SCNC: 4.4 MMOL/L (ref 3.4–5.3)
SODIUM SERPL-SCNC: 140 MMOL/L (ref 135–145)
TRIGL SERPL-MCNC: 110 MG/DL

## 2024-07-24 PROCEDURE — 82043 UR ALBUMIN QUANTITATIVE: CPT | Performed by: NURSE PRACTITIONER

## 2024-07-24 PROCEDURE — G2211 COMPLEX E/M VISIT ADD ON: HCPCS | Performed by: NURSE PRACTITIONER

## 2024-07-24 PROCEDURE — 83036 HEMOGLOBIN GLYCOSYLATED A1C: CPT | Performed by: NURSE PRACTITIONER

## 2024-07-24 PROCEDURE — 99214 OFFICE O/P EST MOD 30 MIN: CPT | Performed by: NURSE PRACTITIONER

## 2024-07-24 PROCEDURE — 80048 BASIC METABOLIC PNL TOTAL CA: CPT | Performed by: NURSE PRACTITIONER

## 2024-07-24 PROCEDURE — 80061 LIPID PANEL: CPT | Performed by: NURSE PRACTITIONER

## 2024-07-24 PROCEDURE — 36415 COLL VENOUS BLD VENIPUNCTURE: CPT | Performed by: NURSE PRACTITIONER

## 2024-07-24 PROCEDURE — 82570 ASSAY OF URINE CREATININE: CPT | Performed by: NURSE PRACTITIONER

## 2024-07-24 RX ORDER — BUSPIRONE HYDROCHLORIDE 10 MG/1
10 TABLET ORAL 2 TIMES DAILY
Qty: 180 TABLET | Refills: 3 | Status: SHIPPED | OUTPATIENT
Start: 2024-07-24

## 2024-07-24 RX ORDER — BUPROPION HYDROCHLORIDE 300 MG/1
300 TABLET ORAL EVERY MORNING
Qty: 90 TABLET | Refills: 3 | Status: SHIPPED | OUTPATIENT
Start: 2024-07-24

## 2024-07-24 RX ORDER — ROSUVASTATIN CALCIUM 20 MG/1
20 TABLET, COATED ORAL DAILY
Qty: 90 TABLET | Refills: 3 | Status: SHIPPED | OUTPATIENT
Start: 2024-07-24

## 2024-07-24 RX ORDER — DULOXETIN HYDROCHLORIDE 60 MG/1
60 CAPSULE, DELAYED RELEASE ORAL DAILY
Qty: 90 CAPSULE | Refills: 3 | Status: SHIPPED | OUTPATIENT
Start: 2024-07-24

## 2024-07-24 RX ORDER — TIZANIDINE 2 MG/1
2 TABLET ORAL 3 TIMES DAILY PRN
Qty: 30 TABLET | Refills: 2 | Status: SHIPPED | OUTPATIENT
Start: 2024-07-24

## 2024-07-24 RX ORDER — LOSARTAN POTASSIUM AND HYDROCHLOROTHIAZIDE 25; 100 MG/1; MG/1
1 TABLET ORAL DAILY
Qty: 90 TABLET | Refills: 3 | Status: SHIPPED | OUTPATIENT
Start: 2024-07-24

## 2024-07-24 RX ORDER — METOPROLOL SUCCINATE 50 MG/1
50 TABLET, EXTENDED RELEASE ORAL DAILY
Qty: 90 TABLET | Refills: 3 | Status: SHIPPED | OUTPATIENT
Start: 2024-07-24

## 2024-07-24 RX ORDER — DIAZEPAM 10 MG
5-10 TABLET ORAL DAILY PRN
Qty: 20 TABLET | Refills: 0 | Status: SHIPPED | OUTPATIENT
Start: 2024-07-24

## 2024-07-24 ASSESSMENT — ANXIETY QUESTIONNAIRES
GAD7 TOTAL SCORE: 21
4. TROUBLE RELAXING: NEARLY EVERY DAY
8. IF YOU CHECKED OFF ANY PROBLEMS, HOW DIFFICULT HAVE THESE MADE IT FOR YOU TO DO YOUR WORK, TAKE CARE OF THINGS AT HOME, OR GET ALONG WITH OTHER PEOPLE?: EXTREMELY DIFFICULT
3. WORRYING TOO MUCH ABOUT DIFFERENT THINGS: NEARLY EVERY DAY
6. BECOMING EASILY ANNOYED OR IRRITABLE: NEARLY EVERY DAY
GAD7 TOTAL SCORE: 21
5. BEING SO RESTLESS THAT IT IS HARD TO SIT STILL: NEARLY EVERY DAY
7. FEELING AFRAID AS IF SOMETHING AWFUL MIGHT HAPPEN: NEARLY EVERY DAY
GAD7 TOTAL SCORE: 21
7. FEELING AFRAID AS IF SOMETHING AWFUL MIGHT HAPPEN: NEARLY EVERY DAY
1. FEELING NERVOUS, ANXIOUS, OR ON EDGE: NEARLY EVERY DAY
IF YOU CHECKED OFF ANY PROBLEMS ON THIS QUESTIONNAIRE, HOW DIFFICULT HAVE THESE PROBLEMS MADE IT FOR YOU TO DO YOUR WORK, TAKE CARE OF THINGS AT HOME, OR GET ALONG WITH OTHER PEOPLE: EXTREMELY DIFFICULT
2. NOT BEING ABLE TO STOP OR CONTROL WORRYING: NEARLY EVERY DAY

## 2024-07-24 ASSESSMENT — PATIENT HEALTH QUESTIONNAIRE - PHQ9
SUM OF ALL RESPONSES TO PHQ QUESTIONS 1-9: 22
10. IF YOU CHECKED OFF ANY PROBLEMS, HOW DIFFICULT HAVE THESE PROBLEMS MADE IT FOR YOU TO DO YOUR WORK, TAKE CARE OF THINGS AT HOME, OR GET ALONG WITH OTHER PEOPLE: EXTREMELY DIFFICULT
SUM OF ALL RESPONSES TO PHQ QUESTIONS 1-9: 22

## 2024-07-24 NOTE — LETTER
July 29, 2024      Amie Pereira  [address]   [place] [address]          Hi Amie,     Cholesterol and A1C look good.  Kidney function is stable.  Your calcium is slightly high. It hasn't been high in the past.  If you are taking a calcium supplement I'd recommend cutting back on the dose.  Otherwise, we can recheck this when you are in next time and if still high we should look into it further.     Marguerite Garcia, CNP    Resulted Orders   Lipid panel reflex to direct LDL Non-fasting   Result Value Ref Range    Cholesterol 181 <200 mg/dL    Triglycerides 110 <150 mg/dL    Direct Measure HDL 66 >=50 mg/dL    LDL Cholesterol Calculated 93 <=100 mg/dL    Non HDL Cholesterol 115 <130 mg/dL    Patient Fasting > 8hrs? Yes     Narrative    Cholesterol  Desirable:  <200 mg/dL    Triglycerides  Normal:  Less than 150 mg/dL  Borderline High:  150-199 mg/dL  High:  200-499 mg/dL  Very High:  Greater than or equal to 500 mg/dL    Direct Measure HDL  Female:  Greater than or equal to 50 mg/dL   Male:  Greater than or equal to 40 mg/dL    LDL Cholesterol  Desirable:  <100mg/dL  Above Desirable:  100-129 mg/dL   Borderline High:  130-159 mg/dL   High:  160-189 mg/dL   Very High:  >= 190 mg/dL    Non HDL Cholesterol  Desirable:  130 mg/dL  Above Desirable:  130-159 mg/dL  Borderline High:  160-189 mg/dL  High:  190-219 mg/dL  Very High:  Greater than or equal to 220 mg/dL       If you have any questions or concerns, please call the clinic at the number listed above.

## 2024-07-24 NOTE — PROGRESS NOTES
"  Assessment & Plan     Moderate episode of recurrent major depressive disorder (H)  Not at goal but doesn't want to make any changes. States she always does worse in the summer when she is not working, will be going back to work on 9/6/24.  She denies SI.  Follow-up in 6 months, sooner if concerns.   - buPROPion (WELLBUTRIN XL) 300 MG 24 hr tablet; Take 1 tablet (300 mg) by mouth every morning  - DULoxetine (CYMBALTA) 60 MG capsule; Take 1 capsule (60 mg) by mouth daily    Anxiety  See above.   - busPIRone (BUSPAR) 10 MG tablet; Take 1 tablet (10 mg) by mouth 2 times daily  - DULoxetine (CYMBALTA) 60 MG capsule; Take 1 capsule (60 mg) by mouth daily    Panic attack  Reports rare use of Valium, states \"I've been using it for years\".  Denies side effects.  Per  last fill was in February 2024, no red flags.  Agreed to no more than 3 prescriptions per year, every 4 months.    - diazepam (VALIUM) 10 MG tablet; Take 0.5-1 tablets (5-10 mg) by mouth daily as needed for anxiety    Fibromyalgia  Chronic, stable.  Continue current medications.      - DULoxetine (CYMBALTA) 60 MG capsule; Take 1 capsule (60 mg) by mouth daily  - tiZANidine (ZANAFLEX) 2 MG tablet; Take 1 tablet (2 mg) by mouth 3 times daily as needed for muscle spasms    Hyperlipidemia LDL goal <100  Lipid panel in process.  Continue Crestor.   - rosuvastatin (CRESTOR) 20 MG tablet; Take 1 tablet (20 mg) by mouth daily  - Lipid panel reflex to direct LDL Non-fasting    Essential hypertension with goal blood pressure less than 140/90  Chronic, stable.  Continue current meds.     - losartan-hydrochlorothiazide (HYZAAR) 100-25 MG tablet; Take 1 tablet by mouth daily  - metoprolol succinate ER (TOPROL XL) 50 MG 24 hr tablet; Take 1 tablet (50 mg) by mouth daily  - Basic metabolic panel  (Ca, Cl, CO2, Creat, Gluc, K, Na, BUN)  - Albumin Random Urine Quantitative with Creat Ratio    Chronic obstructive pulmonary disease, unspecified COPD type (H)  Will be seeing " "pulmonary in near future, denies need for inhaler refill at this time.         Prediabetes  Labs in process.   - Hemoglobin A1c    Encounter for screening mammogram for breast cancer  She is due for screening.   - MA Screen Bilateral w/Philippe; Future      Follow Up Actions Taken  Crisis resource information provided in the After Visit Summary  Patient declined referral.      Subjective   Amie is a 63 year old, presenting for the following health issues:  Depression        7/24/2024     9:39 AM   Additional Questions   Roomed by trish     History of Present Illness       Mental Health Follow-up:  Patient presents to follow-up on Depression & Anxiety.Patient's depression since last visit has been:  Worse  The patient is not having other symptoms associated with depression.  Patient's anxiety since last visit has been:  Worse  The patient is not having other symptoms associated with anxiety.  Any significant life events: financial concerns  Patient is feeling anxious or having panic attacks.  Patient has no concerns about alcohol or drug use.    Reason for visit:  Med list check    She eats 0-1 servings of fruits and vegetables daily.She consumes 1 sweetened beverage(s) daily.She exercises with enough effort to increase her heart rate 9 or less minutes per day.  She exercises with enough effort to increase her heart rate 3 or less days per week.   She is taking medications regularly.     Needs refills on chronic meds.   Mood has been worse.  Worse in the summer when she is not working.  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  She doesn't want to make any med changes or go to therapy.  She denies any SI.        Review of Systems  Constitutional, HEENT, cardiovascular, pulmonary, gi and gu systems are negative, except as otherwise noted.      Objective    /83   Pulse 61   Temp 97.5  F (36.4  C)   Ht 1.676 m (5' 6\")   Wt 55.7 kg (122 lb 12.8 oz)   SpO2 100%   BMI 19.82 " kg/m    Body mass index is 19.82 kg/m .  Physical Exam   GENERAL: alert and no distress  EYES: Eyes grossly normal to inspection, PERRL and conjunctivae and sclerae normal  RESP: lungs clear to auscultation - no rales, rhonchi or wheezes  CV: regular rate and rhythm, normal S1 S2, no S3 or S4, no murmur, click or rub, no peripheral edema  MS: no gross musculoskeletal defects noted, no edema  SKIN: no suspicious lesions or rashes  NEURO: Normal strength and tone, mentation intact and speech normal  PSYCH: mentation appears normal, affect normal/bright          The longitudinal plan of care for the diagnosis(es)/condition(s) as documented were addressed during this visit. Due to the added complexity in care, I will continue to support Amie in the subsequent management and with ongoing continuity of care.       Signed Electronically by: Marguerite Garcia, MYNOR

## 2024-07-31 ENCOUNTER — OFFICE VISIT (OUTPATIENT)
Dept: PULMONOLOGY | Facility: CLINIC | Age: 64
End: 2024-07-31
Payer: COMMERCIAL

## 2024-07-31 VITALS
HEART RATE: 74 BPM | WEIGHT: 122 LBS | OXYGEN SATURATION: 96 % | SYSTOLIC BLOOD PRESSURE: 117 MMHG | DIASTOLIC BLOOD PRESSURE: 78 MMHG | BODY MASS INDEX: 19.69 KG/M2

## 2024-07-31 DIAGNOSIS — J44.9 CHRONIC OBSTRUCTIVE PULMONARY DISEASE, UNSPECIFIED COPD TYPE (H): ICD-10-CM

## 2024-07-31 DIAGNOSIS — R91.8 PULMONARY NODULES: ICD-10-CM

## 2024-07-31 DIAGNOSIS — J44.9 COPD, MODERATE (H): Chronic | ICD-10-CM

## 2024-07-31 PROCEDURE — G2211 COMPLEX E/M VISIT ADD ON: HCPCS | Performed by: INTERNAL MEDICINE

## 2024-07-31 PROCEDURE — 99215 OFFICE O/P EST HI 40 MIN: CPT | Performed by: INTERNAL MEDICINE

## 2024-07-31 RX ORDER — FLUTICASONE PROPIONATE AND SALMETEROL 500; 50 UG/1; UG/1
1 POWDER RESPIRATORY (INHALATION) EVERY 12 HOURS
Qty: 3 EACH | Refills: 11 | Status: SHIPPED | OUTPATIENT
Start: 2024-07-31

## 2024-07-31 RX ORDER — ALBUTEROL SULFATE 0.83 MG/ML
2.5 SOLUTION RESPIRATORY (INHALATION) 4 TIMES DAILY
Qty: 100 ML | Refills: 4 | Status: SHIPPED | OUTPATIENT
Start: 2024-07-31

## 2024-07-31 RX ORDER — UMECLIDINIUM 62.5 UG/1
1 AEROSOL, POWDER ORAL DAILY
Qty: 3 EACH | Refills: 3 | Status: SHIPPED | OUTPATIENT
Start: 2024-07-31 | End: 2025-07-26

## 2024-07-31 NOTE — NURSING NOTE
Georgette Pereira's goals for this visit include:   Chief Complaint   Patient presents with    Follow Up     Breathing is worsening, inhalers doesn't seem to be helping much     COPD       She requests these members of her care team be copied on today's visit information: yes     PCP: Marguerite Garcia    Referring Provider:  Referred Self, MD  No address on file    /78 (BP Location: Left arm, Patient Position: Chair, Cuff Size: Adult Regular)   Pulse 74   Wt 55.3 kg (122 lb)   SpO2 96%   BMI 19.69 kg/m      Do you need any medication refills at today's visit? Yes     TAMIKA Carvajal   Neph/Pulm Teams  [facility]

## 2024-07-31 NOTE — PROGRESS NOTES
Pulmonary Clinic New Patient Consult  Reason for Consult: COPD  History of Present Illness  Amie Pereira is a pleasant 63 yof with history of COPD with moderately severe obstruction on PFTs who presents to clinic for follow up for same. She was last seen in clinic in 8/2023.  To briefly review, Amie has severe COPD with considerable SOB and exercise limitation and she is on a regimen of high dose Advair, Spiriva and scheduled nebulized albuterol in the morning. She has had frequent hospitalizations for COPD exacerbations at [facility] in 3/2023 and 5/2023. The initial hospitalization appeared to have been triggered by influenza pneumonia. When I saw her in clinic, we discussed adherence to her inhalers as well as precautions to tale to prevent frequent viral exposures.   Today, she is doing somewhat better but continues to be very symptomatic. There has been no AECOPDs since the last clinic visit.  At present she would be able to walk 3 - 4  blocks before she would need to stop and rest.  She previously smoked up to 1/2 pack per day cigarettes for 30 years and quit about 14 years ago and has vaped since that time. She uses her albuterol inhaler every day. No orthopnea, pedal swellings or weight loss.    Review of Systems:  10 of 14 systems reviewed and are negative unless otherwise stated in HPI.    Past Medical History:   Diagnosis Date    ACL (anterior cruciate ligament) tear 02/27/2012    Left     Cerebral infarction (H) 7/2022    Chronic obstructive pulmonary disease with acute exacerbation (H) 11/23/2014    First hospitalization 11/23/14     CKD (chronic kidney disease) stage 3, GFR 30-59 ml/min (H) 06/30/2020    CTS (carpal tunnel syndrome) 05/01/2010    Degenerative joint disease     Depressive disorder     Hypertension     Temporomandibular joint disorders, unspecified     Uncomplicated asthma        Past Surgical History:   Procedure Laterality Date    COLONOSCOPY N/A 03/11/2022    Procedure:  COLONOSCOPY;  Surgeon: Trell Courtney MD;  Location: [facility]    HYSTERECTOMY VAGINAL      ORTHOPEDIC SURGERY  2012    [facility] ARTHROSCOPY/SURGERY  2000       Family History   Problem Relation Age of Onset    Cerebrovascular Disease Mother     Hypertension Mother     Heart Disease Mother     Cancer Mother         Lung    Substance Abuse Mother     Depression Mother     Cerebrovascular Disease Father     Hypertension Father     Cancer Father         Metastatic, primary lung/Prostate    Substance Abuse Father     Colon Cancer Father     Prostate Cancer Father     Substance Abuse Brother     Hypertension Brother     Depression Brother     Schizophrenia Brother     Bipolar Disorder Brother     Substance Abuse Brother     Depression Brother     Anxiety Disorder Brother     Mental Illness Brother     Breast Cancer Maternal Grandmother     Coronary Artery Disease Maternal Grandmother         MI at 41    Depression Son     Mental Illness Son     Asthma Son     Aneurysm Paternal Uncle         Brain Aneurysms    Cancer Other     Other Cancer No family hx of        Social History     Socioeconomic History    Marital status:      Spouse name: None    Number of children: None    Years of education: None    Highest education level: None   Tobacco Use    Smoking status: Former Smoker     Packs/day: 1.00     Years: 32.00     Pack years: 32.00     Types: Cigarettes, Other     Quit date: 2012     Years since quittin.3    Smokeless tobacco: Never Used    Tobacco comment: using nicotine replacement: e cigarattes   Vaping Use    Vaping Use: Never used   Substance and Sexual Activity    Alcohol use: No     Alcohol/week: 0.0 standard drinks    Drug use: No    Sexual activity: Never     Partners: Male     Birth control/protection: Surgical   Other Topics Concern    Parent/sibling w/ CABG, MI or angioplasty before 65F 55M? Yes   Social History Narrative    Lives in apartment, single ().  2 kids.  Drives  [employer].          The patient has a 30 pk yr tobacco hx.  She has no active use but using e -cig. Quit actual cig Oct 2012.  Alcohol use is 0 alcoholic drinks per week.  She denies use of recreational drugs.          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          The patient is single.  Has 2 children.        Hot Tube Exposure: NO    Recent Travel: NO     Hx of incarceration:  NO    Bird Exposure:   NO    Animal Exposure:  NO    Inhalation Exposure:  NO                 Allergies   Allergen Reactions    Acetaminophen     Hydrocodone     Lisinopril Cough    Vicodin [Hydrocodone-Acetaminophen] Nausea and Vomiting         Current Outpatient Medications:     albuterol (PROVENTIL) (2.5 MG/3ML) 0.083% neb solution, Take 1 vial (2.5 mg) by nebulization 4 times daily, Disp: 100 mL, Rfl: 4    aspirin (ASA) 325 MG EC tablet, Take 325 mg by mouth every 6 hours as needed, Disp: , Rfl:     buPROPion (WELLBUTRIN XL) 300 MG 24 hr tablet, Take 1 tablet (300 mg) by mouth every morning, Disp: 90 tablet, Rfl: 3    busPIRone (BUSPAR) 10 MG tablet, Take 1 tablet (10 mg) by mouth 2 times daily, Disp: 180 tablet, Rfl: 3    diazepam (VALIUM) 10 MG tablet, Take 0.5-1 tablets (5-10 mg) by mouth daily as needed for anxiety, Disp: 20 tablet, Rfl: 0    DULoxetine (CYMBALTA) 60 MG capsule, Take 1 capsule (60 mg) by mouth daily, Disp: 90 capsule, Rfl: 3    losartan-hydrochlorothiazide (HYZAAR) 100-25 MG tablet, Take 1 tablet by mouth daily, Disp: 90 tablet, Rfl: 3    metoprolol succinate ER (TOPROL XL) 50 MG 24 hr tablet, Take 1 tablet (50 mg) by mouth daily, Disp: 90 tablet, Rfl: 3    rizatriptan (MAXALT-MLT) 10 MG ODT, Take 1 tablet (10 mg) by mouth at onset of headache for migraine May repeat in 2 hours. Max 3 tablets/24 hours., Disp: 90 tablet, Rfl: 1    rosuvastatin (CRESTOR) 20 MG tablet, Take 1 tablet (20 mg) by mouth daily, Disp: 90 tablet, Rfl: 3    tiZANidine (ZANAFLEX) 2 MG tablet, Take 1 tablet (2  mg) by mouth 3 times daily as needed for muscle spasms, Disp: 30 tablet, Rfl: 2    VENTOLIN  (90 Base) MCG/ACT inhaler, INHALE 2 PUFFS INTO THE LUNGS EVERY 6 HOURS AS NEEDED FOR SHORTNESS OF BREATH OR DIFFICULT BREATHING OR WHEEZING, Disp: 18 g, Rfl: 3    fluticasone-salmeterol (ADVAIR) 500-50 MCG/ACT inhaler, Inhale 1 puff into the lungs every 12 hours for 90 days, Disp: 3 each, Rfl: 11    guaiFENesin (ROBITUSSIN) 20 mg/mL liquid, Take 200 mg by mouth every 4 hours as needed (Patient not taking: Reported on 5/20/2024), Disp: , Rfl:     tiotropium (SPIRIVA) 18 MCG inhaled capsule, Inhale 1 capsule (18 mcg) into the lungs daily for 360 days, Disp: 90 capsule, Rfl: 3      Physical Exam:  /78 (BP Location: Left arm, Patient Position: Chair, Cuff Size: Adult Regular)   Pulse 74   Wt 55.3 kg (122 lb)   SpO2 96%   BMI 19.69 kg/m    GENERAL: Well developed, well nourished, alert, and in no apparent distress.  HEENT: Normocephalic, atraumatic. PERRL, EOMI. Oral mucosa is moist. No perioral cyanosis.  NECK: supple, no masses, no thyromegaly.  RESP:  Normal respiratory effort.  CTAB.  No rales, wheezes, rhonchi.  No cyanosis or clubbing.  CV: Normal S1, S2, regular rhythm, normal rate. No murmur.  No LE edema.   ABDOMEN:  Soft, non-tender, non-distended.   SKIN: warm and dry. No rash.  NEURO: AAOx3.  Normal gait.  Fluent speech.  PSYCH: mentation appears normal.     Results:  PFTs: Discussed and reviewed with patient- moderate obstruction with impaired diffusion capacity  Most Recent [facility] Pulmonary Function Testing    FVC-Pred   Date Value Ref Range Status   07/31/2023 2.96 L      FVC-Pre   Date Value Ref Range Status   07/31/2023 2.64 L      FVC-%Pred-Pre   Date Value Ref Range Status   07/31/2023 89 %      FEV1-Pre   Date Value Ref Range Status   07/31/2023 1.19 L      FEV1-%Pred-Pre   Date Value Ref Range Status   07/31/2023 50 %      FEV1FVC-Pred   Date Value Ref Range Status   07/31/2023 80 %   "    FEV1FVC-Pre   Date Value Ref Range Status   07/31/2023 45 %      No results found for: \"20029\"  FEFMax-Pred   Date Value Ref Range Status   07/31/2023 6.34 L/sec      FEFMax-Pre   Date Value Ref Range Status   07/31/2023 2.84 L/sec      FEFMax-%Pred-Pre   Date Value Ref Range Status   07/31/2023 44 %      ExpTime-Pre   Date Value Ref Range Status   07/31/2023 9.65 sec      FIFMax-Pre   Date Value Ref Range Status   07/31/2023 4.34 L/sec      FEV1FEV6-Pred   Date Value Ref Range Status   07/31/2023 80 %      FEV1FEV6-Pre   Date Value Ref Range Status   07/31/2023 50 %      No results found for: \"20055\"  Imaging (personally reviewed in clinic today):    Assessment and Plan:   COPD (Group D)    CAT score of 15 which is an improvement from 30. She is now on a regimen of Advair and Spiriva with albuterol inhaler/nebs as needed. There are fewer exacerbations now but she is still SOB. I will refer her for pulmonary rehab and a referral was placed.  Low suspicion for pulmonary hypertension.  Prescriptions for all three inhalers listed above were renewed today.    History of smoking/Lung Nodules  Appears to have concerning nodules and we will need a closely follow up in 6 months with CT chest  Lung Cancer Screening Shared Decision Making Visit   Georgette Pereira, a 61 year old female, is eligible for lung cancer screening  History   Smoking Status    Former Smoker    Packs/day: 1.00    Years: 32.00    Types: Cigarettes, Other    Quit date: 12/31/2012   Smokeless Tobacco    Never Used     Comment: using nicotine replacement: e cigarattes     I have discussed with patient the risks and benefits of screening for lung cancer with low-dose CT.   The risks include:  radiation exposure: one low dose chest CT has as much ionizing radiation as about 15 chest x-rays, or 6 months of background radiation living in [place]    false positives: most findings/nodules are NOT cancer, but some might still require additional " diagnostic evaluation, including biopsy  over-diagnosis: some slow growing cancers that might never have been clinically significant will be detected and treated unnecessarily   The benefit of early detection of lung cancer is contingent upon adherence to annual screening or more frequent follow up if indicated.   Furthermore, to benefit from screening, Georgette must be willing and able to undergo diagnostic procedures, if indicated. Although no specific guide is available for determining severity of comorbidities, it is reasonable to withhold screening in patients who have greater mortality risk from other diseases.   We did discuss that the best way to prevent lung cancer is to not smoke.  Some patients may value a numeric estimation of lung cancer risk when evaluating if lung cancer screening is right for them, here is one calculator:  Questions and concerns were answered to the patient's satisfaction.  she was provided with my contact information should new questions or concerns arise in the interim.  she should return to clinic in 6 months   Up to date on vaccinations (due for shingles vaccine)    I spent 40 minutes on the date of the encounter doing chart review, history and exam, documentation and further coordination as noted above exclusive of time spent for lung cancer screening.      Lucia Reyse MD  Pulmonary, Critical Care and Sleep Medicine  [facility]  Pager: [phone]        The above note was dictated using voice recognition software and may include typographical errors. Please contact the author for any clarifications.

## 2024-08-26 ENCOUNTER — ANCILLARY PROCEDURE (OUTPATIENT)
Dept: GENERAL RADIOLOGY | Facility: CLINIC | Age: 64
End: 2024-08-26
Attending: STUDENT IN AN ORGANIZED HEALTH CARE EDUCATION/TRAINING PROGRAM
Payer: COMMERCIAL

## 2024-08-26 ENCOUNTER — OFFICE VISIT (OUTPATIENT)
Dept: URGENT CARE | Facility: URGENT CARE | Age: 64
End: 2024-08-26
Payer: COMMERCIAL

## 2024-08-26 VITALS
WEIGHT: 125.8 LBS | DIASTOLIC BLOOD PRESSURE: 89 MMHG | RESPIRATION RATE: 20 BRPM | SYSTOLIC BLOOD PRESSURE: 148 MMHG | HEART RATE: 91 BPM | TEMPERATURE: 98.4 F | BODY MASS INDEX: 20.3 KG/M2 | OXYGEN SATURATION: 98 %

## 2024-08-26 DIAGNOSIS — M54.50 LUMBOSACRAL PAIN: Primary | ICD-10-CM

## 2024-08-26 DIAGNOSIS — M54.50 LUMBOSACRAL PAIN: ICD-10-CM

## 2024-08-26 DIAGNOSIS — M54.2 CERVICALGIA: ICD-10-CM
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
## 2024-08-26 PROCEDURE — 99214 OFFICE O/P EST MOD 30 MIN: CPT | Performed by: STUDENT IN AN ORGANIZED HEALTH CARE EDUCATION/TRAINING PROGRAM

## 2024-08-26 PROCEDURE — 72220 X-RAY EXAM SACRUM TAILBONE: CPT | Mod: TC | Performed by: PREVENTIVE MEDICINE

## 2024-08-26 PROCEDURE — 72040 X-RAY EXAM NECK SPINE 2-3 VW: CPT | Mod: TC | Performed by: PREVENTIVE MEDICINE

## 2024-08-26 PROCEDURE — 72100 X-RAY EXAM L-S SPINE 2/3 VWS: CPT | Mod: TC | Performed by: PREVENTIVE MEDICINE

## 2024-08-26 RX ORDER — METHYLPREDNISOLONE 4 MG
TABLET, DOSE PACK ORAL
Qty: 21 TABLET | Refills: 0 | Status: SHIPPED | OUTPATIENT
Start: 2024-08-26

## 2024-08-26 ASSESSMENT — PAIN SCALES - GENERAL: PAINLEVEL: EXTREME PAIN (8)

## 2024-08-26 NOTE — LETTER
August 26, 2024      Georgette Pereira  [address]   [place] [address]        To Whom It May Concern:    Georgette Pereira  was seen on 8/26/24.  Please excuse her from work through 9/2/24 returning to work on 9/3/24 due to injury.        Sincerely,        KATHRYN Jimenez CNP
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
## 2024-09-27 ENCOUNTER — PATIENT OUTREACH (OUTPATIENT)
Dept: CARE COORDINATION | Facility: CLINIC | Age: 64
End: 2024-09-27

## 2024-11-05 ENCOUNTER — PATIENT OUTREACH (OUTPATIENT)
Dept: CARE COORDINATION | Facility: CLINIC | Age: 64
End: 2024-11-05

## 2024-11-19 ENCOUNTER — PATIENT OUTREACH (OUTPATIENT)
Dept: CARE COORDINATION | Facility: CLINIC | Age: 64
End: 2024-11-19

## 2024-11-21 DIAGNOSIS — F41.0 PANIC ATTACK: ICD-10-CM

## 2024-11-21 RX ORDER — DIAZEPAM 10 MG/1
TABLET ORAL
Qty: 20 TABLET | Refills: 0 | Status: SHIPPED | OUTPATIENT
Start: 2024-11-21

## 2024-12-18 ENCOUNTER — PATIENT OUTREACH (OUTPATIENT)
Dept: CARE COORDINATION | Facility: CLINIC | Age: 64
End: 2024-12-18

## 2025-01-06 ENCOUNTER — PATIENT OUTREACH (OUTPATIENT)
Dept: FAMILY MEDICINE | Facility: CLINIC | Age: 65
End: 2025-01-06

## 2025-01-06 NOTE — TELEPHONE ENCOUNTER
Patient Quality Outreach    Patient is due for the following:   Breast Cancer Screening - Mammogram  Depression  -  PHQ-9 needed  Physical Preventive Adult Physical    Action(s) Taken:   Schedule a Adult Preventative    Type of outreach:    Sent Nev[employer] message.    Questions for provider review:    None           Caroline Calabrese, CMA

## 2025-01-25 ENCOUNTER — HEALTH MAINTENANCE LETTER (OUTPATIENT)
Age: 65
End: 2025-01-25

## 2025-02-12 ENCOUNTER — ANCILLARY PROCEDURE (OUTPATIENT)
Dept: CT IMAGING | Facility: CLINIC | Age: 65
End: 2025-02-12
Attending: INTERNAL MEDICINE
Payer: MEDICAID

## 2025-02-12 DIAGNOSIS — R91.8 PULMONARY NODULES: ICD-10-CM

## 2025-02-12 PROCEDURE — 71250 CT THORAX DX C-: CPT | Mod: TC | Performed by: RADIOLOGY

## 2025-03-05 ENCOUNTER — OFFICE VISIT (OUTPATIENT)
Dept: FAMILY MEDICINE | Facility: CLINIC | Age: 65
End: 2025-03-05
Payer: COMMERCIAL

## 2025-03-05 VITALS
OXYGEN SATURATION: 96 % | WEIGHT: 131 LBS | HEART RATE: 84 BPM | SYSTOLIC BLOOD PRESSURE: 137 MMHG | TEMPERATURE: 97.3 F | BODY MASS INDEX: 21.05 KG/M2 | HEIGHT: 66 IN | DIASTOLIC BLOOD PRESSURE: 89 MMHG

## 2025-03-05 DIAGNOSIS — N18.31 CHRONIC KIDNEY DISEASE, STAGE 3A (H): Primary | ICD-10-CM

## 2025-03-05 DIAGNOSIS — I10 ESSENTIAL HYPERTENSION WITH GOAL BLOOD PRESSURE LESS THAN 140/90: ICD-10-CM

## 2025-03-05 DIAGNOSIS — N89.8 VAGINAL ITCHING: ICD-10-CM

## 2025-03-05 DIAGNOSIS — K59.00 CONSTIPATION, UNSPECIFIED CONSTIPATION TYPE: ICD-10-CM

## 2025-03-05 DIAGNOSIS — F41.9 ANXIETY: ICD-10-CM

## 2025-03-05 DIAGNOSIS — M79.7 FIBROMYALGIA: ICD-10-CM

## 2025-03-05 DIAGNOSIS — F33.1 MODERATE EPISODE OF RECURRENT MAJOR DEPRESSIVE DISORDER (H): ICD-10-CM

## 2025-03-05 DIAGNOSIS — J44.9 CHRONIC OBSTRUCTIVE PULMONARY DISEASE, UNSPECIFIED COPD TYPE (H): ICD-10-CM

## 2025-03-05 DIAGNOSIS — J84.10 PULMONARY FIBROSIS, UNSPECIFIED (H): ICD-10-CM

## 2025-03-05 DIAGNOSIS — E78.5 HYPERLIPIDEMIA LDL GOAL <100: ICD-10-CM

## 2025-03-05 RX ORDER — DULOXETIN HYDROCHLORIDE 60 MG/1
60 CAPSULE, DELAYED RELEASE ORAL DAILY
Qty: 90 CAPSULE | Refills: 3 | Status: SHIPPED | OUTPATIENT
Start: 2025-03-05

## 2025-03-05 RX ORDER — TRIAMCINOLONE ACETONIDE 1 MG/G
CREAM TOPICAL 2 TIMES DAILY
Qty: 60 G | Refills: 2 | Status: SHIPPED | OUTPATIENT
Start: 2025-03-05

## 2025-03-05 RX ORDER — FLUTICASONE PROPIONATE AND SALMETEROL 500; 50 UG/1; UG/1
1 POWDER RESPIRATORY (INHALATION) EVERY 12 HOURS
Qty: 3 EACH | Refills: 3 | Status: SHIPPED | OUTPATIENT
Start: 2025-03-05

## 2025-03-05 RX ORDER — LOSARTAN POTASSIUM AND HYDROCHLOROTHIAZIDE 25; 100 MG/1; MG/1
1 TABLET ORAL DAILY
Qty: 90 TABLET | Refills: 3 | Status: SHIPPED | OUTPATIENT
Start: 2025-03-05

## 2025-03-05 RX ORDER — UMECLIDINIUM 62.5 UG/1
1 AEROSOL, POWDER ORAL DAILY
Qty: 3 EACH | Refills: 3 | Status: SHIPPED | OUTPATIENT
Start: 2025-03-05

## 2025-03-05 RX ORDER — ROSUVASTATIN CALCIUM 20 MG/1
20 TABLET, COATED ORAL DAILY
Qty: 90 TABLET | Refills: 3 | Status: SHIPPED | OUTPATIENT
Start: 2025-03-05

## 2025-03-05 RX ORDER — BUSPIRONE HYDROCHLORIDE 10 MG/1
10 TABLET ORAL 2 TIMES DAILY
Qty: 180 TABLET | Refills: 3 | Status: SHIPPED | OUTPATIENT
Start: 2025-03-05

## 2025-03-05 RX ORDER — ALBUTEROL SULFATE 90 UG/1
2 INHALANT RESPIRATORY (INHALATION) EVERY 6 HOURS PRN
Qty: 18 G | Refills: 3 | Status: SHIPPED | OUTPATIENT
Start: 2025-03-05

## 2025-03-05 RX ORDER — CYCLOBENZAPRINE HCL 5 MG
5 TABLET ORAL 3 TIMES DAILY PRN
Qty: 30 TABLET | Refills: 2 | Status: SHIPPED | OUTPATIENT
Start: 2025-03-05

## 2025-03-05 RX ORDER — METOPROLOL SUCCINATE 50 MG/1
50 TABLET, EXTENDED RELEASE ORAL DAILY
Qty: 90 TABLET | Refills: 3 | Status: SHIPPED | OUTPATIENT
Start: 2025-03-05

## 2025-03-05 RX ORDER — BUPROPION HYDROCHLORIDE 300 MG/1
300 TABLET ORAL EVERY MORNING
Qty: 90 TABLET | Refills: 3 | Status: SHIPPED | OUTPATIENT
Start: 2025-03-05

## 2025-03-05 ASSESSMENT — PATIENT HEALTH QUESTIONNAIRE - PHQ9
SUM OF ALL RESPONSES TO PHQ QUESTIONS 1-9: 12
SUM OF ALL RESPONSES TO PHQ QUESTIONS 1-9: 12
SUM OF ALL RESPONSES TO PHQ QUESTIONS 1-9: 14
10. IF YOU CHECKED OFF ANY PROBLEMS, HOW DIFFICULT HAVE THESE PROBLEMS MADE IT FOR YOU TO DO YOUR WORK, TAKE CARE OF THINGS AT HOME, OR GET ALONG WITH OTHER PEOPLE: EXTREMELY DIFFICULT

## 2025-03-05 ASSESSMENT — PAIN SCALES - GENERAL: PAINLEVEL_OUTOF10: SEVERE PAIN (7)

## 2025-03-05 ASSESSMENT — ENCOUNTER SYMPTOMS: BACK PAIN: 1

## 2025-03-05 NOTE — PATIENT INSTRUCTIONS
Constipation-   Either Citrucel or Metamucil 1 tbsp in a glass of water every day.  64 ounces of water daily.  If not improving, add Miralax 1 capful daily in 8 oz of any beverage once daily, back off if you get diarrhea.    Let me know if not improving in the next few weeks.

## 2025-03-05 NOTE — PROGRESS NOTES
Assessment & Plan       Chronic kidney disease, stage 3a (H)  Chronic, stable.  Labs at next visit.       Pulmonary fibrosis, unspecified (H)  Chronic, stable.  Following with pulmonology, has appointment coming up.  Needed refills, sent in for her today.       Chronic obstructive pulmonary disease, unspecified COPD type (H)  See above.   - fluticasone-salmeterol (ADVAIR) 500-50 MCG/ACT inhaler; Inhale 1 puff into the lungs every 12 hours.  - umeclidinium (INCRUSE ELLIPTA) 62.5 MCG/ACT inhaler; Inhale 1 puff into the lungs daily.  - albuterol (VENTOLIN HFA) 108 (90 Base) MCG/ACT inhaler; Inhale 2 puffs into the lungs every 6 hours as needed for shortness of breath.    Moderate episode of recurrent major depressive disorder (H)  Chronic, stable.  Continue current meds.   - buPROPion (WELLBUTRIN XL) 300 MG 24 hr tablet; Take 1 tablet (300 mg) by mouth every morning.  - DULoxetine (CYMBALTA) 60 MG capsule; Take 1 capsule (60 mg) by mouth daily.    Anxiety  Chronic, stable.  Continue current meds.   - busPIRone (BUSPAR) 10 MG tablet; Take 1 tablet (10 mg) by mouth 2 times daily.  - DULoxetine (CYMBALTA) 60 MG capsule; Take 1 capsule (60 mg) by mouth daily.    Fibromyalgia  Chronic, stable.  Continue current meds.   - cyclobenzaprine (FLEXERIL) 5 MG tablet; Take 1 tablet (5 mg) by mouth 3 times daily as needed for muscle spasms (or pain).  - DULoxetine (CYMBALTA) 60 MG capsule; Take 1 capsule (60 mg) by mouth daily.    Essential hypertension with goal blood pressure less than 140/90  Chronic, stable.  Continue current meds.   - losartan-hydrochlorothiazide (HYZAAR) 100-25 MG tablet; Take 1 tablet by mouth daily.  - metoprolol succinate ER (TOPROL XL) 50 MG 24 hr tablet; Take 1 tablet (50 mg) by mouth daily.    Hyperlipidemia LDL goal <100  Chronic, stable.  Continue current meds. Labs at next visit.     - rosuvastatin (CRESTOR) 20 MG tablet; Take 1 tablet (20 mg) by mouth daily.    Vaginal itching  Continue  "triamcinolone prn.   - triamcinolone (KENALOG) 0.1 % external cream; Apply topically 2 times daily.    Constipation  Discussed adding fiber such as metamucil, increasing water and using Miralax prn.  Follow-up if not improving.       Subjective   Amie is a 64 year old, presenting for the following health issues:  Back Pain        3/5/2025     1:11 PM   Additional Questions   Roomed by trish     History of Present Illness       Back Pain:  She presents for follow up of back pain. Patient's back pain is a chronic problem.  Location of back pain:  Right lower back, right middle of back, right upper back, right side of neck, left side of neck, right shoulder, right buttock and left buttock  Description of back pain: burning  Back pain spreads: left shoulder and left side of neck    Since patient first noticed back pain, pain is: gradually worsening  Does back pain interfere with her job:  Yes       Reason for visit:  Stomach problems and med review    She eats 0-1 servings of fruits and vegetables daily.She consumes 1 sweetened beverage(s) daily.She exercises with enough effort to increase her heart rate 9 or less minutes per day.  She exercises with enough effort to increase her heart rate 5 days per week.   She is taking medications regularly.          Having constipation. BM about 1 time per week.  Gets cramping. Tried taking a laxative and caused her to have diarrhea for 2 days.     No blood in stool.   No nausea or vomiting.   No changes in diet or meds.     Not taking a fiber supplement.  Trying to be better at water.     Colonoscopy up to date, last completed 2022.     Didn't have insurance for a while so she ran out of medications.  Has insurance again, needs refills.          Review of Systems  Constitutional, HEENT, cardiovascular, pulmonary, gi and gu systems are negative, except as otherwise noted.      Objective    /89   Pulse 84   Temp 97.3  F (36.3  C) (Tympanic)   Ht 1.676 m (5' 6\")   Wt 59.4 kg " (131 lb)   SpO2 96%   BMI 21.14 kg/m    Body mass index is 21.14 kg/m .  Physical Exam   GENERAL: alert and no distress  EYES: Eyes grossly normal to inspection, PERRL and conjunctivae and sclerae normal  RESP: lungs clear to auscultation - no rales, rhonchi or wheezes  CV: regular rate and rhythm, normal S1 S2, no S3 or S4, no murmur, click or rub, no peripheral edema  MS: no gross musculoskeletal defects noted, no edema  SKIN: no suspicious lesions or rashes  NEURO: Normal strength and tone, mentation intact and speech normal  PSYCH: mentation appears normal, affect normal/bright    Recent Labs   Lab Test 07/24/24  1021 11/07/23  1031    143   POTASSIUM 4.4 3.4   CHLORIDE 103 101   CO2 27 29   ANIONGAP 10 13   * 96   BUN 20.1 23.7*   CR 1.31* 1.28*   MICHELLE 10.9* 10.2     Recent Labs   Lab Test 07/24/24  1021 11/07/23  1031   CHOL 181 163   HDL 66 51   LDL 93 56   TRIG 110 280*             Signed Electronically by: Marguerite Garcia, CNP

## 2025-04-13 ENCOUNTER — MYC REFILL (OUTPATIENT)
Dept: FAMILY MEDICINE | Facility: CLINIC | Age: 65
End: 2025-04-13
Payer: COMMERCIAL

## 2025-04-13 DIAGNOSIS — F41.0 PANIC ATTACK: ICD-10-CM

## 2025-04-14 ENCOUNTER — OFFICE VISIT (OUTPATIENT)
Dept: PULMONOLOGY | Facility: CLINIC | Age: 65
End: 2025-04-14
Attending: INTERNAL MEDICINE
Payer: COMMERCIAL

## 2025-04-14 VITALS
WEIGHT: 131 LBS | DIASTOLIC BLOOD PRESSURE: 101 MMHG | BODY MASS INDEX: 21.05 KG/M2 | HEART RATE: 79 BPM | OXYGEN SATURATION: 98 % | HEIGHT: 66 IN | SYSTOLIC BLOOD PRESSURE: 139 MMHG | RESPIRATION RATE: 12 BRPM

## 2025-04-14 DIAGNOSIS — J44.9 CHRONIC OBSTRUCTIVE PULMONARY DISEASE, UNSPECIFIED COPD TYPE (H): ICD-10-CM

## 2025-04-14 DIAGNOSIS — R91.8 PULMONARY NODULES: Primary | ICD-10-CM

## 2025-04-14 PROCEDURE — 99215 OFFICE O/P EST HI 40 MIN: CPT | Performed by: INTERNAL MEDICINE

## 2025-04-14 PROCEDURE — 3075F SYST BP GE 130 - 139MM HG: CPT | Performed by: INTERNAL MEDICINE

## 2025-04-14 PROCEDURE — 1126F AMNT PAIN NOTED NONE PRSNT: CPT | Performed by: INTERNAL MEDICINE

## 2025-04-14 PROCEDURE — 3080F DIAST BP >= 90 MM HG: CPT | Performed by: INTERNAL MEDICINE

## 2025-04-14 PROCEDURE — G2211 COMPLEX E/M VISIT ADD ON: HCPCS | Performed by: INTERNAL MEDICINE

## 2025-04-14 RX ORDER — DIAZEPAM 10 MG/1
5-10 TABLET ORAL DAILY PRN
Qty: 20 TABLET | Refills: 0 | Status: SHIPPED | OUTPATIENT
Start: 2025-04-14

## 2025-04-14 ASSESSMENT — PAIN SCALES - GENERAL: PAINLEVEL_OUTOF10: NO PAIN (0)

## 2025-04-14 NOTE — PROGRESS NOTES
"Georgette Pereira's goals for this visit include: Return  She requests these members of her care team be copied on today's visit information: PCP    PCP: Marguerite Garcia    Referring Provider:  Lucia Reyes MD  [address] [place] [address]    BP (!) 139/101   Pulse 79   Resp 12   Ht 1.676 m (5' 5.98\")   Wt 59.4 kg (131 lb)   SpO2 98%   BMI 21.15 kg/m      Do you need any medication refills at today's visit? KENDAL Grant LPN  Pulmonary Medicine:  [facility]  Phone: [phone] Fax: [phone]    Pulmonary Clinic Return Patient Patient  Reason for Patient: COPD  History of Present Illness  Amie Pereira is a pleasant 64 yof with history of COPD with moderately severe obstruction on PFTs who presents to clinic for follow up for same. She was last seen in clinic in 7/2024.  To briefly review, Amie has severe COPD with considerable SOB and exercise limitation and she is on a regimen of high dose Advair, Spiriva and scheduled nebulized albuterol in the morning. She has had frequent hospitalizations for COPD exacerbations at [facility] in 3/2023 and 5/2023. The initial hospitalization appeared to have been triggered by influenza pneumonia. When I saw her in clinic, we discussed adherence to her inhalers as well as precautions to tale to prevent frequent viral exposures.   She is on a regimen of Advair and Spiriva as well as albuterol as needed. During the last clinic visit, she was still SOB and I referred her for pulmonary rehab. Unfortunately, she was out of insurance and was unable to do it.  Today, she is doing somewhat better but continues to be very symptomatic. There has been no AECOPDs since the last clinic visit.  At present she would be able to walk 3 - 4  blocks before she would need to stop and rest.  She previously smoked up to 1/2 pack per day cigarettes for 30 years and quit about 12 years ago and has vaped since that time. She uses her albuterol " inhaler every day. No orthopnea, pedal swellings or weight loss.    Review of Systems:  10 of 14 systems reviewed and are negative unless otherwise stated in HPI.    Past Medical History:   Diagnosis Date    ACL (anterior cruciate ligament) tear 02/27/2012    Left     Cerebral infarction (H) 7/2022    Chronic obstructive pulmonary disease with acute exacerbation (H) 11/23/2014    First hospitalization 11/23/14     CKD (chronic kidney disease) stage 3, GFR 30-59 ml/min (H) 06/30/2020    CTS (carpal tunnel syndrome) 05/01/2010    Degenerative joint disease     Depressive disorder     Hypertension     Temporomandibular joint disorders, unspecified     Uncomplicated asthma        Past Surgical History:   Procedure Laterality Date    COLONOSCOPY N/A 03/11/2022    Procedure: COLONOSCOPY;  Surgeon: Trell Courtney MD;  Location: [facility]    HYSTERECTOMY VAGINAL      ORTHOPEDIC SURGERY  2012    [facility] ARTHROSCOPY/SURGERY  01/01/2000       Family History   Problem Relation Age of Onset    Cerebrovascular Disease Mother     Hypertension Mother     Heart Disease Mother     Cancer Mother         Lung    Substance Abuse Mother     Depression Mother     Cerebrovascular Disease Father     Hypertension Father     Cancer Father         Metastatic, primary lung/Prostate    Substance Abuse Father     Colon Cancer Father     Prostate Cancer Father     Substance Abuse Brother     Hypertension Brother     Depression Brother     Schizophrenia Brother     Bipolar Disorder Brother     Substance Abuse Brother     Depression Brother     Anxiety Disorder Brother     Mental Illness Brother     Breast Cancer Maternal Grandmother     Coronary Artery Disease Maternal Grandmother         MI at 41    Depression Son     Mental Illness Son     Asthma Son     Aneurysm Paternal Uncle         Brain Aneurysms    Cancer Other     Other Cancer No family hx of        Social History     Socioeconomic History    Marital status:      Spouse name: None     Number of children: None    Years of education: None    Highest education level: None   Tobacco Use    Smoking status: Former Smoker     Packs/day: 1.00     Years: 32.00     Pack years: 32.00     Types: Cigarettes, Other     Quit date: 2012     Years since quittin.3    Smokeless tobacco: Never Used    Tobacco comment: using nicotine replacement: e cigarattes   Vaping Use    Vaping Use: Never used   Substance and Sexual Activity    Alcohol use: No     Alcohol/week: 0.0 standard drinks    Drug use: No    Sexual activity: Never     Partners: Male     Birth control/protection: Surgical   Other Topics Concern    Parent/sibling w/ CABG, MI or angioplasty before 65F 55M? Yes   Social History Narrative    Lives in apartment, single ().  2 kids.  Drives [employer].          The patient has a 30 pk yr tobacco hx.  She has no active use but using e -cig. Quit actual cig Oct 2012.  Alcohol use is 0 alcoholic drinks per week.  She denies use of recreational drugs.          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          The patient is single.  Has 2 children.        Hot Tube Exposure: NO    Recent Travel: NO     Hx of incarceration:  NO    Bird Exposure:   NO    Animal Exposure:  NO    Inhalation Exposure:  NO                 Allergies   Allergen Reactions    Acetaminophen     Hydrocodone     Lisinopril Cough    Vicodin [Hydrocodone-Acetaminophen] Nausea and Vomiting         Current Outpatient Medications:     albuterol (PROVENTIL) (2.5 MG/3ML) 0.083% neb solution, Take 1 vial (2.5 mg) by nebulization 4 times daily, Disp: 100 mL, Rfl: 4    albuterol (VENTOLIN HFA) 108 (90 Base) MCG/ACT inhaler, Inhale 2 puffs into the lungs every 6 hours as needed for shortness of breath., Disp: 18 g, Rfl: 3    aspirin (ASA) 325 MG EC tablet, Take 325 mg by mouth every 6 hours as needed, Disp: , Rfl:     buPROPion (WELLBUTRIN XL) 300 MG 24 hr tablet, Take 1 tablet (300 mg) by mouth every  "morning., Disp: 90 tablet, Rfl: 3    busPIRone (BUSPAR) 10 MG tablet, Take 1 tablet (10 mg) by mouth 2 times daily., Disp: 180 tablet, Rfl: 3    cyclobenzaprine (FLEXERIL) 5 MG tablet, Take 1 tablet (5 mg) by mouth 3 times daily as needed for muscle spasms (or pain)., Disp: 30 tablet, Rfl: 2    diazepam (VALIUM) 10 MG tablet, TAKE 1/2 TO 1 TABLET BY MOUTH DAILY AS NEEDED, Disp: 20 tablet, Rfl: 0    DULoxetine (CYMBALTA) 60 MG capsule, Take 1 capsule (60 mg) by mouth daily., Disp: 90 capsule, Rfl: 3    fluticasone-salmeterol (ADVAIR) 500-50 MCG/ACT inhaler, Inhale 1 puff into the lungs every 12 hours., Disp: 3 each, Rfl: 3    losartan-hydrochlorothiazide (HYZAAR) 100-25 MG tablet, Take 1 tablet by mouth daily., Disp: 90 tablet, Rfl: 3    metoprolol succinate ER (TOPROL XL) 50 MG 24 hr tablet, Take 1 tablet (50 mg) by mouth daily., Disp: 90 tablet, Rfl: 3    rizatriptan (MAXALT-MLT) 10 MG ODT, Take 1 tablet (10 mg) by mouth at onset of headache for migraine May repeat in 2 hours. Max 3 tablets/24 hours., Disp: 90 tablet, Rfl: 1    rosuvastatin (CRESTOR) 20 MG tablet, Take 1 tablet (20 mg) by mouth daily., Disp: 90 tablet, Rfl: 3    triamcinolone (KENALOG) 0.1 % external cream, Apply topically 2 times daily., Disp: 60 g, Rfl: 2    umeclidinium (INCRUSE ELLIPTA) 62.5 MCG/ACT inhaler, Inhale 1 puff into the lungs daily., Disp: 3 each, Rfl: 3      Physical Exam:  BP (!) 139/101   Pulse 79   Resp 12   Ht 1.676 m (5' 5.98\")   Wt 59.4 kg (131 lb)   SpO2 98%   BMI 21.15 kg/m    GENERAL: Well developed, well nourished, alert, and in no apparent distress.  HEENT: Normocephalic, atraumatic. PERRL, EOMI. Oral mucosa is moist. No perioral cyanosis.  NECK: supple, no masses, no thyromegaly.  RESP:  Normal respiratory effort.  CTAB.  No rales, wheezes, rhonchi.  No cyanosis or clubbing.  CV: Normal S1, S2, regular rhythm, normal rate. No murmur.  No LE edema.   ABDOMEN:  Soft, non-tender, non-distended.   SKIN: warm and dry. " "No rash.  NEURO: AAOx3.  Normal gait.  Fluent speech.  PSYCH: mentation appears normal.     Results:  PFTs: Discussed and reviewed with patient- moderate obstruction with impaired diffusion capacity  Most Recent [facility] Pulmonary Function Testing    FVC-Pred   Date Value Ref Range Status   07/31/2023 2.96 L      FVC-Pre   Date Value Ref Range Status   07/31/2023 2.64 L      FVC-%Pred-Pre   Date Value Ref Range Status   07/31/2023 89 %      FEV1-Pre   Date Value Ref Range Status   07/31/2023 1.19 L      FEV1-%Pred-Pre   Date Value Ref Range Status   07/31/2023 50 %      FEV1FVC-Pred   Date Value Ref Range Status   07/31/2023 80 %      FEV1FVC-Pre   Date Value Ref Range Status   07/31/2023 45 %      No results found for: \"20029\"  FEFMax-Pred   Date Value Ref Range Status   07/31/2023 6.34 L/sec      FEFMax-Pre   Date Value Ref Range Status   07/31/2023 2.84 L/sec      FEFMax-%Pred-Pre   Date Value Ref Range Status   07/31/2023 44 %      ExpTime-Pre   Date Value Ref Range Status   07/31/2023 9.65 sec      FIFMax-Pre   Date Value Ref Range Status   07/31/2023 4.34 L/sec      FEV1FEV6-Pred   Date Value Ref Range Status   07/31/2023 80 %      FEV1FEV6-Pre   Date Value Ref Range Status   07/31/2023 50 %      No results found for: \"20055\"  Imaging (personally reviewed in clinic today):    Assessment and Plan:   COPD (Group D)    CAT score of 20. She is now on a regimen of Advair and Spiriva with albuterol inhaler/nebs as needed. There are fewer exacerbations now but she is still SOB. I will refer her for pulmonary rehab and a referral was placed since she has insurance now.  Low suspicion for pulmonary hypertension.      History of smoking/Lung Nodules  Appears to have concerning nodules and we will need a closely follow up in 3 months and CT chest has been ordered for 5/2025  Lung Cancer Screening Shared Decision Making Visit   Georgette Pereira, a 61 year old female, is eligible for lung cancer screening  History "   Smoking Status    Former Smoker    Packs/day: 1.00    Years: 32.00    Types: Cigarettes, Other    Quit date: 12/31/2012   Smokeless Tobacco    Never Used     Comment: using nicotine replacement: e cigarattes     I have discussed with patient the risks and benefits of screening for lung cancer with low-dose CT.   The risks include:  radiation exposure: one low dose chest CT has as much ionizing radiation as about 15 chest x-rays, or 6 months of background radiation living in [place]    false positives: most findings/nodules are NOT cancer, but some might still require additional diagnostic evaluation, including biopsy  over-diagnosis: some slow growing cancers that might never have been clinically significant will be detected and treated unnecessarily   The benefit of early detection of lung cancer is contingent upon adherence to annual screening or more frequent follow up if indicated.   Furthermore, to benefit from screening, Georgette must be willing and able to undergo diagnostic procedures, if indicated. Although no specific guide is available for determining severity of comorbidities, it is reasonable to withhold screening in patients who have greater mortality risk from other diseases.   We did discuss that the best way to prevent lung cancer is to not smoke.  Some patients may value a numeric estimation of lung cancer risk when evaluating if lung cancer screening is right for them, here is one calculator:  Questions and concerns were answered to the patient's satisfaction.  she was provided with my contact information should new questions or concerns arise in the interim.  she should return to clinic in 6 months   Up to date on vaccinations (due for shingles vaccine)    I spent 40 minutes on the date of the encounter doing chart review, history and exam, documentation and further coordination as noted above exclusive of time spent for lung cancer screening.      Lucia Reyes MD  Pulmonary, Critical Care  and Sleep Medicine  [facility]  Pager: [phone]        The above note was dictated using voice recognition software and may include typographical errors. Please contact the author for any clarifications.

## 2025-05-06 ENCOUNTER — PATIENT OUTREACH (OUTPATIENT)
Dept: INTERNAL MEDICINE | Facility: CLINIC | Age: 65
End: 2025-05-06
Payer: COMMERCIAL

## 2025-05-29 ENCOUNTER — OFFICE VISIT (OUTPATIENT)
Dept: URGENT CARE | Facility: URGENT CARE | Age: 65
End: 2025-05-29
Payer: COMMERCIAL

## 2025-05-29 VITALS
WEIGHT: 133.1 LBS | BODY MASS INDEX: 22.17 KG/M2 | HEIGHT: 65 IN | HEART RATE: 83 BPM | TEMPERATURE: 97.1 F | DIASTOLIC BLOOD PRESSURE: 85 MMHG | SYSTOLIC BLOOD PRESSURE: 137 MMHG | RESPIRATION RATE: 38 BRPM | OXYGEN SATURATION: 91 %

## 2025-05-29 DIAGNOSIS — R07.9 CHEST PAIN, UNSPECIFIED TYPE: ICD-10-CM

## 2025-05-29 DIAGNOSIS — R06.82 TACHYPNEA: ICD-10-CM

## 2025-05-29 DIAGNOSIS — R06.02 SHORTNESS OF BREATH: Primary | ICD-10-CM

## 2025-05-29 ASSESSMENT — PAIN SCALES - GENERAL: PAINLEVEL_OUTOF10: MODERATE PAIN (6)

## 2025-05-29 NOTE — PROGRESS NOTES
Urgent Care Clinic Visit    Chief Complaint   Patient presents with    Urgent Care    Shortness of Breath     Sob, with wheezing x's 1 week chest tightness                5/29/2025     6:44 PM   Additional Questions   Roomed by ca   Accompanied by self

## 2025-05-29 NOTE — PROGRESS NOTES
"Assessment & Plan     Shortness of breath      Chest pain, unspecified type      Tachypnea       Go to emergency room for further evaluation of worst shortness of breath of her life with resolving cough, chest pain, headache as higher level of care indicated. She arlene prefer not to go to ED because she thinks she will be admitted but is agreeable and declines ambulance. She is discharged in stable condition.             Hallie Donis NP  [facility] [place]    Brian Holloway is a 64 year old female who presents to clinic today for the following health issues:  Chief Complaint   Patient presents with    Urgent Care    Shortness of Breath     Sob, with wheezing x's 1 week chest tightness          5/29/2025     6:44 PM   Additional Questions   Roomed by ca   Accompanied by self       Patient presents for evaluation of shortness of breath. Associated symptoms: neck pain, chest pain, wheezing. She had a cough which resolved and shortness of breath has been worsening.   Denies fever, sore throat. Has been using albuterol about 6 times a day which helps temporarily. Delsym did help with the cough. Last used albuterol at 3pm.   She has a history of COPD and pneumonia. She vapes.     Problem list, Medication list, Allergies, and Medical history reviewed in EPIC.    ROS:  Review of systems negative except for noted above        Objective    /85   Pulse 83   Temp 97.1  F (36.2  C) (Tympanic)   Resp (!) 38   Ht 1.651 m (5' 5\")   Wt 60.4 kg (133 lb 1.6 oz)   SpO2 (!) 91%   BMI 22.15 kg/m    Physical Exam  Constitutional:       General: She is not in acute distress.     Appearance: She is not toxic-appearing or diaphoretic.   HENT:      Head: Normocephalic and atraumatic.   Cardiovascular:      Rate and Rhythm: Normal rate and regular rhythm.      Heart sounds: Normal heart sounds.   Pulmonary:      Breath sounds: Wheezing present. No rhonchi or rales.      Comments: Tachypnea, having to stop " talking to catch breath, increased work of breathing  Neurological:      Mental Status: She is alert.

## 2025-05-29 NOTE — PATIENT INSTRUCTIONS
Go to emergency room for further evaluation of worst shortness of breath of her life with resolving cough, chest pain, headache    Hx COPD

## 2025-05-30 ENCOUNTER — TRANSFERRED RECORDS (OUTPATIENT)
Dept: MULTI SPECIALTY CLINIC | Facility: CLINIC | Age: 65
End: 2025-05-30
Payer: COMMERCIAL

## 2025-05-30 LAB
CREATININE (EXTERNAL): 1.41 MG/DL (ref 0.5–0.9)
GFR ESTIMATED (EXTERNAL): 42 ML/MIN/1.73M2
GLUCOSE (EXTERNAL): 135 MG/DL (ref 70–99)
POTASSIUM (EXTERNAL): 3.7 MMOL/L (ref 3.5–5.1)

## 2025-06-02 ENCOUNTER — RESULTS FOLLOW-UP (OUTPATIENT)
Dept: PULMONOLOGY | Facility: CLINIC | Age: 65
End: 2025-06-02

## 2025-06-02 DIAGNOSIS — R91.8 PULMONARY NODULES: Primary | ICD-10-CM

## 2025-06-02 DIAGNOSIS — Z87.891 PERSONAL HISTORY OF TOBACCO USE: ICD-10-CM

## 2025-06-03 ENCOUNTER — OFFICE VISIT (OUTPATIENT)
Dept: FAMILY MEDICINE | Facility: CLINIC | Age: 65
End: 2025-06-03
Payer: COMMERCIAL

## 2025-06-03 ENCOUNTER — RESULTS FOLLOW-UP (OUTPATIENT)
Dept: FAMILY MEDICINE | Facility: CLINIC | Age: 65
End: 2025-06-03

## 2025-06-03 VITALS
RESPIRATION RATE: 18 BRPM | BODY MASS INDEX: 22.82 KG/M2 | SYSTOLIC BLOOD PRESSURE: 97 MMHG | OXYGEN SATURATION: 98 % | HEIGHT: 65 IN | TEMPERATURE: 98.6 F | HEART RATE: 89 BPM | WEIGHT: 137 LBS | DIASTOLIC BLOOD PRESSURE: 66 MMHG

## 2025-06-03 DIAGNOSIS — N89.8 VAGINAL ITCHING: ICD-10-CM

## 2025-06-03 DIAGNOSIS — J44.1 COPD EXACERBATION (H): Primary | ICD-10-CM

## 2025-06-03 DIAGNOSIS — Z12.31 ENCOUNTER FOR SCREENING MAMMOGRAM FOR BREAST CANCER: ICD-10-CM

## 2025-06-03 DIAGNOSIS — R82.79 ABNORMAL FINDINGS ON MICROBIOLOGICAL EXAMINATION OF URINE: ICD-10-CM

## 2025-06-03 DIAGNOSIS — N95.2 ATROPHIC VAGINITIS: ICD-10-CM

## 2025-06-03 DIAGNOSIS — N18.32 STAGE 3B CHRONIC KIDNEY DISEASE (H): ICD-10-CM

## 2025-06-03 LAB
ALBUMIN UR-MCNC: 100 MG/DL
ANION GAP SERPL CALCULATED.3IONS-SCNC: 13 MMOL/L (ref 7–15)
APPEARANCE UR: ABNORMAL
BILIRUB UR QL STRIP: NEGATIVE
BUN SERPL-MCNC: 31.5 MG/DL (ref 8–23)
CALCIUM SERPL-MCNC: 10.2 MG/DL (ref 8.8–10.4)
CHLORIDE SERPL-SCNC: 103 MMOL/L (ref 98–107)
CLUE CELLS: ABNORMAL
COLOR UR AUTO: YELLOW
CREAT SERPL-MCNC: 1.39 MG/DL (ref 0.51–0.95)
CREAT UR-MCNC: 161 MG/DL
EGFRCR SERPLBLD CKD-EPI 2021: 42 ML/MIN/1.73M2
GLUCOSE SERPL-MCNC: 79 MG/DL (ref 70–99)
GLUCOSE UR STRIP-MCNC: NEGATIVE MG/DL
HCO3 SERPL-SCNC: 29 MMOL/L (ref 22–29)
HGB UR QL STRIP: ABNORMAL
HYALINE CASTS #/AREA URNS LPF: ABNORMAL /LPF
KETONES UR STRIP-MCNC: ABNORMAL MG/DL
LEUKOCYTE ESTERASE UR QL STRIP: ABNORMAL
MICROALBUMIN UR-MCNC: 260 MG/L
MICROALBUMIN/CREAT UR: 161.49 MG/G CR (ref 0–25)
NITRATE UR QL: NEGATIVE
PH UR STRIP: 5.5 [PH] (ref 5–7)
PHOSPHATE SERPL-MCNC: 3.1 MG/DL (ref 2.5–4.5)
POTASSIUM SERPL-SCNC: 3.6 MMOL/L (ref 3.4–5.3)
RBC #/AREA URNS AUTO: ABNORMAL /HPF
SODIUM SERPL-SCNC: 145 MMOL/L (ref 135–145)
SP GR UR STRIP: 1.02 (ref 1–1.03)
SQUAMOUS #/AREA URNS AUTO: ABNORMAL /LPF
TRICHOMONAS, WET PREP: ABNORMAL
UROBILINOGEN UR STRIP-ACNC: 0.2 E.U./DL
WBC #/AREA URNS AUTO: ABNORMAL /HPF
WBC'S/HIGH POWER FIELD, WET PREP: ABNORMAL
YEAST, WET PREP: ABNORMAL

## 2025-06-03 PROCEDURE — G2211 COMPLEX E/M VISIT ADD ON: HCPCS | Performed by: NURSE PRACTITIONER

## 2025-06-03 PROCEDURE — 3074F SYST BP LT 130 MM HG: CPT | Performed by: NURSE PRACTITIONER

## 2025-06-03 PROCEDURE — 87210 SMEAR WET MOUNT SALINE/INK: CPT | Performed by: NURSE PRACTITIONER

## 2025-06-03 PROCEDURE — 82043 UR ALBUMIN QUANTITATIVE: CPT | Performed by: NURSE PRACTITIONER

## 2025-06-03 PROCEDURE — 82570 ASSAY OF URINE CREATININE: CPT | Performed by: NURSE PRACTITIONER

## 2025-06-03 PROCEDURE — 1126F AMNT PAIN NOTED NONE PRSNT: CPT | Performed by: NURSE PRACTITIONER

## 2025-06-03 PROCEDURE — 80048 BASIC METABOLIC PNL TOTAL CA: CPT | Performed by: NURSE PRACTITIONER

## 2025-06-03 PROCEDURE — 87086 URINE CULTURE/COLONY COUNT: CPT | Performed by: NURSE PRACTITIONER

## 2025-06-03 PROCEDURE — 99214 OFFICE O/P EST MOD 30 MIN: CPT | Performed by: NURSE PRACTITIONER

## 2025-06-03 PROCEDURE — 84100 ASSAY OF PHOSPHORUS: CPT | Performed by: NURSE PRACTITIONER

## 2025-06-03 PROCEDURE — 81001 URINALYSIS AUTO W/SCOPE: CPT | Performed by: NURSE PRACTITIONER

## 2025-06-03 PROCEDURE — 36415 COLL VENOUS BLD VENIPUNCTURE: CPT | Performed by: NURSE PRACTITIONER

## 2025-06-03 PROCEDURE — 3078F DIAST BP <80 MM HG: CPT | Performed by: NURSE PRACTITIONER

## 2025-06-03 RX ORDER — CLOBETASOL PROPIONATE 0.5 MG/G
OINTMENT TOPICAL
Qty: 30 G | Refills: 5 | Status: SHIPPED | OUTPATIENT
Start: 2025-06-03

## 2025-06-03 RX ORDER — ESTRADIOL 0.1 MG/G
2 CREAM VAGINAL
Qty: 42.5 G | Refills: 11 | Status: SHIPPED | OUTPATIENT
Start: 2025-06-05

## 2025-06-03 RX ORDER — PREDNISONE 10 MG/1
TABLET ORAL
Qty: 30 TABLET | Refills: 0 | Status: SHIPPED | OUTPATIENT
Start: 2025-06-03 | End: 2025-06-15

## 2025-06-03 RX ORDER — PREDNISONE 20 MG/1
40 TABLET ORAL DAILY
COMMUNITY
Start: 2025-05-31 | End: 2025-06-05

## 2025-06-03 RX ORDER — IPRATROPIUM BROMIDE AND ALBUTEROL SULFATE 2.5; .5 MG/3ML; MG/3ML
3 SOLUTION RESPIRATORY (INHALATION) 4 TIMES DAILY
COMMUNITY
Start: 2025-05-30

## 2025-06-03 ASSESSMENT — PAIN SCALES - GENERAL: PAINLEVEL_OUTOF10: NO PAIN (0)

## 2025-06-03 NOTE — PATIENT INSTRUCTIONS
Start prednisone taper.  Continue inhalers.  Try to stay inside if you can with smoking air quality. Follow-up if not improving.   Vaginal symptoms- start estrogen cream vaginally twice weekly at bedtime.  Can use clobetasol ointment externally at bedtime as needed for itching.   Urine likely not infected but adding on culture- will take a few days.

## 2025-06-03 NOTE — PROGRESS NOTES
Assessment & Plan     COPD exacerbation (H)  Start prednisone taper.  Finish doxycycline. Continue chronic inhalers.  Follow-up if not improving.    - predniSONE (DELTASONE) 10 MG tablet; Take 4 tablets (40 mg) by mouth daily for 3 days, THEN 3 tablets (30 mg) daily for 3 days, THEN 2 tablets (20 mg) daily for 3 days, THEN 1 tablet (10 mg) daily for 3 days.    Vaginal itching  Ua and wet prep negative, see below.   - UA with Microscopic reflex to Culture - lab collect; Future  - UA with Microscopic reflex to Culture - lab collect  - Wet prep - lab collect; Future  - Wet prep - lab collect  - UA Microscopic with Reflex to Culture    Abnormal findings on microbiological examination of urine  - Urine Culture Aerobic Bacterial - lab collect; Future    Atrophic vaginitis  Start vaginal estrogen and also prn clobetasol. Follow-up if not improving.    - estradiol (ESTRACE) 0.1 MG/GM vaginal cream; Place 2 g vaginally twice a week.  - clobetasol (TEMOVATE) 0.05 % external ointment; Apply topically nightly as needed (to external vaginal area for itching).    Stage 3b chronic kidney disease (H)  Labs in process.    - Basic metabolic panel  (Ca, Cl, CO2, Creat, Gluc, K, Na, BUN)  - Albumin Random Urine Quantitative with Creat Ratio  - Phosphorus    Encounter for screening mammogram for breast cancer  - MA Screen Bilateral w/Philippe; Future      Subjective   Amie is a 64 year old, presenting for the following health issues:  Vaginal Problem        6/3/2025     7:48 AM   Additional Questions   Roomed by trish     Vaginal Problem     History of Present Illness       Reason for visit:  Vag infection  Symptom onset:  More than a month  Symptoms include:  Vag itch  Symptom intensity:  Severe  Symptom progression:  Worsening  Had these symptoms before:  No  What makes it worse:  No  What makes it better:  No   She is taking medications regularly.        Reports vaginal itching for 1 month.  No vaginal discharge. No urinary symptoms.  No  "pelvic pain. No fever.       In the ER for COPD exacerbation on 5/30/25.  CXR clear.  Given prednisone 40 mg x 5 days, duoneb and doxycycline.  Some improvement but still having a lot of wheezing and some sob.  Using chronic inhalers as prescribed.         Review of Systems  Constitutional, HEENT, cardiovascular, pulmonary, gi and gu systems are negative, except as otherwise noted.      Objective    BP 97/66   Pulse 89   Temp 98.6  F (37  C)   Ht 1.651 m (5' 5\")   Wt 62.1 kg (137 lb)   SpO2 98%   BMI 22.80 kg/m    Body mass index is 22.8 kg/m .  Physical Exam   GENERAL: alert and no distress  EYES: Eyes grossly normal to inspection, PERRL and conjunctivae and sclerae normal  RESP: lungs clear to auscultation - no rales, rhonchi or wheezes  CV: regular rate and rhythm, normal S1 S2, no S3 or S4, no murmur, click or rub, no peripheral edema   (female): external vaginal tissue erythematous with atrophic changes  MS: no gross musculoskeletal defects noted, no edema  SKIN: no suspicious lesions or rashes        UA RESULTS:  Recent Labs   Lab Test 06/03/25  0812   COLOR Yellow   APPEARANCE Slightly Cloudy*   URINEGLC Negative   URINEBILI Negative   URINEKETONE Trace*   SG 1.025   UBLD Trace*   URINEPH 5.5   PROTEIN 100*   UROBILINOGEN 0.2   NITRITE Negative   LEUKEST Trace*   RBCU 2-5*   WBCU 0-5   *culture pending             Component  Ref Range & Units (hover)  8:14 AM    Trichomonas Absent    Yeast Absent    Clue Cells Absent    WBCs/high power field 4+ Abnormal             Signed Electronically by: Marguerite Garcia, CNP    "

## 2025-06-04 ENCOUNTER — PATIENT OUTREACH (OUTPATIENT)
Dept: CARE COORDINATION | Facility: CLINIC | Age: 65
End: 2025-06-04
Payer: COMMERCIAL

## 2025-06-04 LAB — BACTERIA UR CULT: NO GROWTH

## 2025-07-03 ENCOUNTER — MEDICAL CORRESPONDENCE (OUTPATIENT)
Dept: HEALTH INFORMATION MANAGEMENT | Facility: CLINIC | Age: 65
End: 2025-07-03

## 2025-07-11 ENCOUNTER — ANCILLARY PROCEDURE (OUTPATIENT)
Dept: GENERAL RADIOLOGY | Facility: CLINIC | Age: 65
End: 2025-07-11
Attending: PHYSICIAN ASSISTANT
Payer: COMMERCIAL

## 2025-07-11 DIAGNOSIS — K59.00 CONSTIPATION, UNSPECIFIED CONSTIPATION TYPE: ICD-10-CM

## 2025-07-11 PROCEDURE — 74019 RADEX ABDOMEN 2 VIEWS: CPT | Mod: TC | Performed by: RADIOLOGY

## 2025-07-14 ASSESSMENT — PATIENT HEALTH QUESTIONNAIRE - PHQ9
SUM OF ALL RESPONSES TO PHQ QUESTIONS 1-9: 5
SUM OF ALL RESPONSES TO PHQ QUESTIONS 1-9: 5
10. IF YOU CHECKED OFF ANY PROBLEMS, HOW DIFFICULT HAVE THESE PROBLEMS MADE IT FOR YOU TO DO YOUR WORK, TAKE CARE OF THINGS AT HOME, OR GET ALONG WITH OTHER PEOPLE: SOMEWHAT DIFFICULT

## 2025-07-15 ENCOUNTER — VIRTUAL VISIT (OUTPATIENT)
Dept: FAMILY MEDICINE | Facility: CLINIC | Age: 65
End: 2025-07-15
Payer: COMMERCIAL

## 2025-07-15 DIAGNOSIS — J44.9 CHRONIC OBSTRUCTIVE PULMONARY DISEASE, UNSPECIFIED COPD TYPE (H): ICD-10-CM

## 2025-07-15 DIAGNOSIS — F17.200 NICOTINE DEPENDENCE, UNCOMPLICATED, UNSPECIFIED NICOTINE PRODUCT TYPE: ICD-10-CM

## 2025-07-15 DIAGNOSIS — I10 ESSENTIAL HYPERTENSION WITH GOAL BLOOD PRESSURE LESS THAN 140/90: ICD-10-CM

## 2025-07-15 DIAGNOSIS — F41.9 ANXIETY: Primary | ICD-10-CM

## 2025-07-15 DIAGNOSIS — R11.2 NAUSEA AND VOMITING, UNSPECIFIED VOMITING TYPE: ICD-10-CM

## 2025-07-15 PROCEDURE — 98006 SYNCH AUDIO-VIDEO EST MOD 30: CPT | Performed by: NURSE PRACTITIONER

## 2025-07-15 RX ORDER — LOSARTAN POTASSIUM AND HYDROCHLOROTHIAZIDE 25; 100 MG/1; MG/1
TABLET ORAL
Status: SHIPPED
Start: 2025-07-15

## 2025-07-15 RX ORDER — NICOTINE 21 MG/24HR
1 PATCH, TRANSDERMAL 24 HOURS TRANSDERMAL EVERY 24 HOURS
Qty: 42 PATCH | Refills: 0 | Status: SHIPPED | OUTPATIENT
Start: 2025-07-15 | End: 2025-08-26

## 2025-07-15 RX ORDER — ONDANSETRON 4 MG/1
4 TABLET, ORALLY DISINTEGRATING ORAL EVERY 8 HOURS PRN
Qty: 10 TABLET | Refills: 0 | Status: SHIPPED | OUTPATIENT
Start: 2025-07-15

## 2025-07-15 RX ORDER — BUPROPION HYDROCHLORIDE 150 MG/1
150 TABLET ORAL EVERY MORNING
Qty: 30 TABLET | Refills: 0 | Status: SHIPPED | OUTPATIENT
Start: 2025-07-15

## 2025-07-15 RX ORDER — BUSPIRONE HYDROCHLORIDE 10 MG/1
10 TABLET ORAL DAILY
Qty: 30 TABLET | Refills: 0 | Status: SHIPPED | OUTPATIENT
Start: 2025-07-15

## 2025-07-15 RX ORDER — ALBUTEROL SULFATE 90 UG/1
2 INHALANT RESPIRATORY (INHALATION) EVERY 6 HOURS PRN
Qty: 18 G | Refills: 3 | Status: SHIPPED | OUTPATIENT
Start: 2025-07-15

## 2025-07-15 RX ORDER — UMECLIDINIUM 62.5 UG/1
1 AEROSOL, POWDER ORAL DAILY
Qty: 3 EACH | Refills: 3 | Status: SHIPPED | OUTPATIENT
Start: 2025-07-15

## 2025-07-15 NOTE — PROGRESS NOTES
Amie is a 64 year old who is being evaluated via a billable video visit.    How would you like to obtain your AVS? MyChart  If the video visit is dropped, the invitation should be resent by: Text to cell phone: [phone]  Will anyone else be joining your video visit? No      Assessment & Plan     Anxiety  Doing really well since getting pet cat, has really helped her anxiety.  She'd like to try to wean off medications, we discussed how to do that.  She will follow-up as needed.    - buPROPion (WELLBUTRIN XL) 150 MG 24 hr tablet; Take 1 tablet (150 mg) by mouth every morning. For 3 weeks, then reduce to every other day until gone, then stop.  - busPIRone (BUSPAR) 10 MG tablet; Take 1 tablet (10 mg) by mouth daily. For 3 weeks, then reduce to every other day until gone, then stop    Essential hypertension with goal blood pressure less than 140/90  BP on the low side the last few visits, asymptomatic. Plan to reduce Hyzaar to 1/2 pill daily and continue to monitor.   - losartan-hydrochlorothiazide (HYZAAR) 100-25 MG tablet; Take 1/2 pill once daily by mouth.    Chronic obstructive pulmonary disease, unspecified COPD type (H)  Needs refill of Incruse Ellipta.     - umeclidinium (INCRUSE ELLIPTA) 62.5 MCG/ACT inhaler; Inhale 1 puff into the lungs daily.    Nicotine dependence, uncomplicated, unspecified nicotine product type  Started smoking again and would like to try nicotine patch to quit. Discussed use.     - nicotine (NICODERM CQ) 14 MG/24HR 24 hr patch; Place 1 patch over 24 hours onto the skin every 24 hours.  - nicotine (NICODERM CQ) 7 MG/24HR 24 hr patch; Place 1 patch over 24 hours onto the skin every 24 hours for 14 days.    Nausea and vomiting, unspecified vomiting type  Recent urgent care visit with GI symptoms, mostly resolved but would like to have refill of zofran to have on hand for some mild intermittent nausea.    - ondansetron (ZOFRAN ODT) 4 MG ODT tab; Take 1 tablet (4 mg) by mouth every 8 hours as  needed for nausea.        Subjective   Amie is a 64 year old, presenting for the following health issues:  Recheck Medication        7/15/2025     2:09 PM   Additional Questions   Roomed by trish       History of Present Illness       Reason for visit:  Med check    She eats 2-3 servings of fruits and vegetables daily.She consumes 1 sweetened beverage(s) daily.She exercises with enough effort to increase her heart rate 10 to 19 minutes per day.  She exercises with enough effort to increase her heart rate 5 days per week.   She is taking medications regularly.          Review of Systems  Constitutional, HEENT, cardiovascular, pulmonary, gi and gu systems are negative, except as otherwise noted.      Objective           Vitals:  No vitals were obtained today due to virtual visit.    Physical Exam   GENERAL: alert and no distress  EYES: Eyes grossly normal to inspection.  No discharge or erythema, or obvious scleral/conjunctival abnormalities.  RESP: No audible wheeze, cough, or visible cyanosis.    SKIN: Visible skin clear. No significant rash, abnormal pigmentation or lesions.  NEURO: Cranial nerves grossly intact.  Mentation and speech appropriate for age.  PSYCH: Appropriate affect, tone, and pace of words        Video-Visit Details    Type of service:  Video Visit   Originating Location (pt. Location): Home  Distant Location (provider location):  On-site  Platform used for Video Visit: Kayden    The longitudinal plan of care for the diagnosis(es)/condition(s) as documented were addressed during this visit. Due to the added complexity in care, I will continue to support Amie in the subsequent management and with ongoing continuity of care.     Signed Electronically by: Marguerite Garcia CNP

## 2025-07-15 NOTE — PATIENT INSTRUCTIONS
Nicotine Transdermal System   Habitrol, Nicoderm C-Q    Uses  For quitting smoking.    Instructions  DO NOT take this medicine by mouth.    Avoid placing the patch near the breast.    Remove the patch after 24 hours.    Keep the medicine at room temperature. Avoid heat and direct light.    This patch should not be cut.    Wash your hands before and after handling this medicine.    Remove old patch before applying new one. Change the location of the new patch.    If you have vivid dreams or trouble sleeping, you may remove the patch before going to sleep.    Ask your doctor or pharmacist about locations on your body where this patch can be used.    Remove the plastic liner that protects the sticky side of the patch before applying to the skin.    Be sure the area of skin is clean and dry before putting on a new patch.    Apply the patch to a clean, dry, hairless area.    Press the patch firmly for a few seconds to make sure it stays in place.    After removing the patch, fold it together and discard it out of reach of children and pets.    Please ask your doctor or pharmacist how you can safely dispose of used patches.    If the skin under the patch becomes irritated, remove the patch. Do not apply a new patch to the area until the skin feels better.    To avoid irritating your skin, use a different location for a new patch.    Apply the patch only to normal looking skin. Avoid areas of the skin that are red, have scrapes, or damaged.    If the patch falls off, apply a new a patch on a different location of the body.    Please tell your doctor and pharmacist about all the medicines you take. Include both prescription and over-the-counter medicines. Also tell them about any vitamins, herbal medicines, or anything else you take for your health.    If you need to stop this medicine, your doctor may wish to gradually reduce the dosage before stopping.    Do not use more than 1 patch at any one time.    Cautions  Tell  your doctor and pharmacist if you ever had an allergic reaction to a medicine. Symptoms of an allergic reaction can include trouble breathing, skin rash, itching, swelling, or severe dizziness.    Do not use the medication any more than instructed.    Avoid smoking while on this medicine. Smoking may increase your risk for stroke, heart attack, blood clots, high blood pressure, and other diseases of the heart and blood vessels.    Tell the doctor or pharmacist if you are pregnant, planning to be pregnant, or breastfeeding.    Ask your pharmacist if this medicine can interact with any of your other medicines. Be sure to tell them about all the medicines you take.    Please tell all your doctors and dentists that you are on this medicine before they provide care.    Side Effects  The following is a list of some common side effects from this medicine. Please speak with your doctor about what you should do if you experience these or other side effects.    skin irritation where medicine is applied    If you have any of the following side effects, you may be getting too much medicine. Please contact your doctor to let them know about these side effects.    diarrhea  dizziness  nausea  rapid heartbeat  vomiting    A few people may have an allergic reaction to this medicine. Symptoms can include difficulty breathing, skin rash, itching, swelling, or severe dizziness. If you notice any of these symptoms, seek medical help quickly.    Extra  Please speak with your doctor, nurse, or pharmacist if you have any questions about this medicine.      [email or web address]  IMPORTANT NOTE: This document tells you briefly how to take your medicine, but it does not tell you all there is to know about it. Your doctor or pharmacist may give you other documents about your medicine. Please talk to them if you have any questions. Always follow their advice. There is a more complete description of this medicine  available in English. Scan this code on your smartphone or tablet or use the web address below. You can also ask your pharmacist for a printout. If you have any questions, please ask your pharmacist.   2021 [employer].      6746-5105 [employer]. All rights reserved. This information is not intended as a substitute for professional medical care. Always follow your healthcare professional's instructions.

## 2025-07-17 ENCOUNTER — MYC MEDICAL ADVICE (OUTPATIENT)
Dept: FAMILY MEDICINE | Facility: CLINIC | Age: 65
End: 2025-07-17
Payer: COMMERCIAL

## 2025-07-23 ENCOUNTER — TELEPHONE (OUTPATIENT)
Dept: INTERNAL MEDICINE | Facility: CLINIC | Age: 65
End: 2025-07-23

## 2025-07-23 NOTE — TELEPHONE ENCOUNTER
Patient Quality Outreach    Patient is due for the following:   Breast Cancer Screening - Mammogram    Action(s) Taken:   No follow up needed at this time. Has appointment 10/10/25    Type of outreach:    Chart review performed, no outreach needed.    Questions for provider review:    None         Krista Verma MA  Chart routed to None.

## 2025-08-10 DIAGNOSIS — F41.9 ANXIETY: ICD-10-CM

## 2025-08-11 RX ORDER — BUSPIRONE HYDROCHLORIDE 10 MG/1
TABLET ORAL
Qty: 30 TABLET | Refills: 0 | OUTPATIENT
Start: 2025-08-11

## 2025-08-12 ENCOUNTER — OFFICE VISIT (OUTPATIENT)
Dept: FAMILY MEDICINE | Facility: CLINIC | Age: 65
End: 2025-08-12
Payer: COMMERCIAL

## 2025-08-12 VITALS
SYSTOLIC BLOOD PRESSURE: 130 MMHG | DIASTOLIC BLOOD PRESSURE: 84 MMHG | HEART RATE: 79 BPM | TEMPERATURE: 97.9 F | BODY MASS INDEX: 21.99 KG/M2 | HEIGHT: 65 IN | WEIGHT: 132 LBS | OXYGEN SATURATION: 99 %

## 2025-08-12 DIAGNOSIS — F33.1 MODERATE RECURRENT MAJOR DEPRESSION (H): ICD-10-CM

## 2025-08-12 DIAGNOSIS — N18.32 STAGE 3B CHRONIC KIDNEY DISEASE (H): ICD-10-CM

## 2025-08-12 DIAGNOSIS — R73.03 PREDIABETES: ICD-10-CM

## 2025-08-12 DIAGNOSIS — Z00.00 ROUTINE GENERAL MEDICAL EXAMINATION AT A HEALTH CARE FACILITY: Primary | ICD-10-CM

## 2025-08-12 DIAGNOSIS — R44.0 AUDITORY HALLUCINATIONS: ICD-10-CM

## 2025-08-12 DIAGNOSIS — E78.5 HYPERLIPIDEMIA LDL GOAL <130: Chronic | ICD-10-CM

## 2025-08-12 DIAGNOSIS — I10 ESSENTIAL HYPERTENSION WITH GOAL BLOOD PRESSURE LESS THAN 140/90: Chronic | ICD-10-CM

## 2025-08-12 PROBLEM — N18.30 CKD (CHRONIC KIDNEY DISEASE) STAGE 3, GFR 30-59 ML/MIN (H): Status: RESOLVED | Noted: 2020-06-30 | Resolved: 2025-08-12

## 2025-08-12 LAB
EST. AVERAGE GLUCOSE BLD GHB EST-MCNC: 120 MG/DL
HBA1C MFR BLD: 5.8 % (ref 0–5.6)
HGB BLD-MCNC: 14.3 G/DL (ref 11.7–15.7)
MCV RBC AUTO: 96.3 FL (ref 78–100)

## 2025-08-12 PROCEDURE — 3075F SYST BP GE 130 - 139MM HG: CPT | Performed by: NURSE PRACTITIONER

## 2025-08-12 PROCEDURE — 36415 COLL VENOUS BLD VENIPUNCTURE: CPT | Performed by: NURSE PRACTITIONER

## 2025-08-12 PROCEDURE — 3079F DIAST BP 80-89 MM HG: CPT | Performed by: NURSE PRACTITIONER

## 2025-08-12 PROCEDURE — G2211 COMPLEX E/M VISIT ADD ON: HCPCS | Performed by: NURSE PRACTITIONER

## 2025-08-12 PROCEDURE — 1126F AMNT PAIN NOTED NONE PRSNT: CPT | Performed by: NURSE PRACTITIONER

## 2025-08-12 PROCEDURE — 99214 OFFICE O/P EST MOD 30 MIN: CPT | Mod: 25 | Performed by: NURSE PRACTITIONER

## 2025-08-12 PROCEDURE — 96127 BRIEF EMOTIONAL/BEHAV ASSMT: CPT | Performed by: NURSE PRACTITIONER

## 2025-08-12 PROCEDURE — 3044F HG A1C LEVEL LT 7.0%: CPT | Performed by: NURSE PRACTITIONER

## 2025-08-12 PROCEDURE — 80061 LIPID PANEL: CPT | Performed by: NURSE PRACTITIONER

## 2025-08-12 PROCEDURE — 83036 HEMOGLOBIN GLYCOSYLATED A1C: CPT | Performed by: NURSE PRACTITIONER

## 2025-08-12 PROCEDURE — 99396 PREV VISIT EST AGE 40-64: CPT | Performed by: NURSE PRACTITIONER

## 2025-08-12 PROCEDURE — 80048 BASIC METABOLIC PNL TOTAL CA: CPT | Performed by: NURSE PRACTITIONER

## 2025-08-12 PROCEDURE — 85018 HEMOGLOBIN: CPT | Performed by: NURSE PRACTITIONER

## 2025-08-12 SDOH — HEALTH STABILITY: PHYSICAL HEALTH: ON AVERAGE, HOW MANY DAYS PER WEEK DO YOU ENGAGE IN MODERATE TO STRENUOUS EXERCISE (LIKE A BRISK WALK)?: 4 DAYS

## 2025-08-12 ASSESSMENT — SOCIAL DETERMINANTS OF HEALTH (SDOH): HOW OFTEN DO YOU GET TOGETHER WITH FRIENDS OR RELATIVES?: PATIENT DECLINED

## 2025-08-12 ASSESSMENT — PATIENT HEALTH QUESTIONNAIRE - PHQ9
SUM OF ALL RESPONSES TO PHQ QUESTIONS 1-9: 10
10. IF YOU CHECKED OFF ANY PROBLEMS, HOW DIFFICULT HAVE THESE PROBLEMS MADE IT FOR YOU TO DO YOUR WORK, TAKE CARE OF THINGS AT HOME, OR GET ALONG WITH OTHER PEOPLE: VERY DIFFICULT
SUM OF ALL RESPONSES TO PHQ QUESTIONS 1-9: 10

## 2025-08-12 ASSESSMENT — PAIN SCALES - GENERAL: PAINLEVEL_OUTOF10: NO PAIN (0)

## 2025-08-13 ENCOUNTER — PATIENT OUTREACH (OUTPATIENT)
Dept: CARE COORDINATION | Facility: CLINIC | Age: 65
End: 2025-08-13
Payer: COMMERCIAL

## 2025-08-13 LAB
ANION GAP SERPL CALCULATED.3IONS-SCNC: 12 MMOL/L (ref 7–15)
BUN SERPL-MCNC: 18.2 MG/DL (ref 8–23)
CALCIUM SERPL-MCNC: 10.3 MG/DL (ref 8.8–10.4)
CHLORIDE SERPL-SCNC: 103 MMOL/L (ref 98–107)
CHOLEST SERPL-MCNC: 165 MG/DL
CREAT SERPL-MCNC: 1.34 MG/DL (ref 0.51–0.95)
EGFRCR SERPLBLD CKD-EPI 2021: 44 ML/MIN/1.73M2
FASTING STATUS PATIENT QL REPORTED: NO
FASTING STATUS PATIENT QL REPORTED: NO
GLUCOSE SERPL-MCNC: 93 MG/DL (ref 70–99)
HCO3 SERPL-SCNC: 28 MMOL/L (ref 22–29)
HDLC SERPL-MCNC: 58 MG/DL
LDLC SERPL CALC-MCNC: 76 MG/DL
NONHDLC SERPL-MCNC: 107 MG/DL
POTASSIUM SERPL-SCNC: 3.9 MMOL/L (ref 3.4–5.3)
SODIUM SERPL-SCNC: 143 MMOL/L (ref 135–145)
TRIGL SERPL-MCNC: 154 MG/DL

## (undated) DEVICE — KIT ENDO TURNOVER/PROCEDURE CARRY-ON 101822

## (undated) DEVICE — GLOVE EXAM NITRILE LG

## (undated) DEVICE — TUBING SUCTION 12"X1/4" N612

## (undated) DEVICE — LUBRICATING JELLY 4.25OZ

## (undated) DEVICE — SOL WATER IRRIG 1000ML BOTTLE 07139-09